# Patient Record
Sex: FEMALE | Race: WHITE | HISPANIC OR LATINO | Employment: FULL TIME | URBAN - METROPOLITAN AREA
[De-identification: names, ages, dates, MRNs, and addresses within clinical notes are randomized per-mention and may not be internally consistent; named-entity substitution may affect disease eponyms.]

---

## 2017-04-21 ENCOUNTER — ALLSCRIPTS OFFICE VISIT (OUTPATIENT)
Dept: OTHER | Facility: OTHER | Age: 22
End: 2017-04-21

## 2017-04-24 ENCOUNTER — GENERIC CONVERSION - ENCOUNTER (OUTPATIENT)
Dept: OTHER | Facility: OTHER | Age: 22
End: 2017-04-24

## 2017-06-15 ENCOUNTER — GENERIC CONVERSION - ENCOUNTER (OUTPATIENT)
Dept: OTHER | Facility: OTHER | Age: 22
End: 2017-06-15

## 2017-07-18 ENCOUNTER — ALLSCRIPTS OFFICE VISIT (OUTPATIENT)
Dept: OTHER | Facility: OTHER | Age: 22
End: 2017-07-18

## 2017-08-28 ENCOUNTER — ALLSCRIPTS OFFICE VISIT (OUTPATIENT)
Dept: OTHER | Facility: OTHER | Age: 22
End: 2017-08-28

## 2017-09-03 ENCOUNTER — LAB CONVERSION - ENCOUNTER (OUTPATIENT)
Dept: OTHER | Facility: OTHER | Age: 22
End: 2017-09-03

## 2017-09-03 LAB
CHLAMYDIA, LIQUID-BASED (HISTORICAL): NOT DETECTED
GC BY MOL. METHOD (HISTORICAL): NOT DETECTED
HPV 18 (HISTORICAL): NOT DETECTED
HPV HIGH RISK 16/18 (HISTORICAL): DETECTED
HPV16 (HISTORICAL): NOT DETECTED
PAP (HISTORICAL): ABNORMAL
TRICHOMONAS (HISTORICAL): NOT DETECTED

## 2017-09-20 LAB
CHLAMYDIA, LIQUID-BASED (HISTORICAL): NORMAL
GC BY MOL. METHOD (HISTORICAL): NORMAL
PAP (HISTORICAL): NORMAL

## 2017-11-09 ENCOUNTER — ALLSCRIPTS OFFICE VISIT (OUTPATIENT)
Dept: OTHER | Facility: OTHER | Age: 22
End: 2017-11-09

## 2017-11-10 NOTE — PROGRESS NOTES
Assessment    1  Large breasts (611 1) (N62)   2  Chronic thoracic back pain (724 1,338 29) (M54 6,G89 29)   3  Need for influenza vaccination (V04 81) (Z23)    Plan  Large breasts    · 1 - Jerrica KING, [facility]  *  Status: Active Requested for: [id number]  Care Summary provided  : Yes  Need for influenza vaccination    · Fluzone Quadrivalent 0 5 ML Intramuscular Suspension Prefilled Syringe    Discussion/Summary    Large breast with difficulty in life- exercise, rash, activities  Painful shoulder and back  Chief Complaint  Patient here with mid back pain thinking it's coming from large breast can't sleeping      History of Present Illness  HPI: back pain, unable to sleep on stomach, boils under breast, painful rash under [id number] DD, but falling out of itlarge chestwith strap indentations   Breast Hypertrophy (Brief): The patient is being seen for an initial evaluation of breast hypertrophy  Symptoms:  breast size unacceptable to the patient,-- large breasts,-- breast tenderness,-- shoulder pain,-- back pain,-- skin irritation-- and-- exercise impairment, but-- no breast pain  Associated symptoms:  breast skin rash  The patient is not currently being treated for this problem  Review of Systems   Constitutional: No fever, no chills, feels well, no tiredness, no recent weight gain or loss  ENT: no ear ache, no loss of hearing, no nosebleeds or nasal discharge, no sore throat or hoarseness  Cardiovascular: no complaints of slow or fast heart rate, no chest pain, no palpitations, no leg claudication or lower extremity edema  Respiratory: no complaints of shortness of breath, no wheezing, no dyspnea on exertion, no orthopnea or PND  Breasts: rash, but-- as noted in HPI  Gastrointestinal: no complaints of abdominal pain, no constipation, no nausea or diarrhea, no vomiting, no bloody stools    Genitourinary: no complaints of dysuria, no incontinence, no pelvic pain, no dysmenorrhea, no vaginal discharge or abnormal vaginal bleeding  Musculoskeletal: no complaints of arthralgia, no myalgia, no joint swelling or stiffness, no limb pain or swelling  Integumentary: no complaints of skin rash or lesion, no itching or dry skin, no skin wounds  Neurological: no complaints of headache, no confusion, no numbness or tingling, no dizziness or fainting  Active Problems  1  Contraceptive use (V25 40) (Z30 40)   2  Encounter for gynecological examination without abnormal finding (V72 31) (Z01 419)   3  Encounter for PPD test (V74 1) (Z11 1)   4  Hemorrhoids, external (455 3) (K64 4)   5  Need for DTaP vaccine (V06 1) (Z23)   6  Need for influenza vaccination (V04 81) (Z23)   7  Obesity (278 00) (E66 9)   8  Unsatisfactory cervical Papanicolaou smear (795 08) (R87 615)   9  Weight gain (783 1) (R63 5)   10  Well adult on routine health check (V70 0) (Z00 00)    Past Medical History  1  History of Acute maxillary sinusitis (461 0) (J01 00)   2  History of External Hemorrhoids (455 3)   3  History of fatigue (V13 89) (Z87 898)   4  History of fever (V13 89) (Z87 898)   5  History of upper respiratory infection (V12 09) (Z87 09)   6  History of Joint pain, knee (719 46) (M25 569)   7  History of Screen for STD (sexually transmitted disease) (V74 5) (Z11 3)   8  History of Sore throat (462) (J02 9)  Active Problems And Past Medical History Reviewed: The active problems and past medical history were reviewed and updated today  Family History  Mother    1  Family history of Healthy adult  Father    2  Family history of Diabetes  Maternal Aunt    3  Family history of Breast cancer  Family History Reviewed: The family history was reviewed and updated today  Social History   · Denied: History of Alcohol Use (History)   · Denied: History of Drug Use   · Never A Smoker  The social history was reviewed and updated today  Surgical History    1   History of Oral Surgery Tooth Extraction  Surgical History Reviewed: The surgical history was reviewed and updated today  Current Meds   1  Orsythia 0 1-20 MG-MCG Oral Tablet; Take 1 tablet daily; Therapy: 86AMK1270 to (Evaluate:19Jun2018)  Requested for: 32GLL9588; Last Rx:43Udn0308 Ordered    The medication list was reviewed and updated today  Allergies  1  No Known Drug Allergies  2  No Known Environmental Allergies   3  No Known Food Allergies    Vitals   Recorded: 22WCG2723 10:42AM   Heart Rate 88   Respiration 18   Systolic 735   Diastolic 70   Height 5 ft 3 in   Weight 222 lb 8 oz   BMI Calculated 39 41   BSA Calculated 2 02       Physical Exam   Constitutional  General appearance: No acute distress, well appearing and well nourished  Pulmonary  Respiratory effort: No increased work of breathing or signs of respiratory distress  Auscultation of lungs: Clear to auscultation  Cardiovascular  Auscultation of heart: Normal rate and rhythm, normal S1 and S2, without murmurs  Examination of extremities for edema and/or varicosities: Normal    Abdomen  Abdomen: Non-tender, no masses  Liver and spleen: No hepatomegaly or splenomegaly  Musculoskeletal  Inspection/palpation of joints, bones, and muscles: Abnormal  -- posture forward, spnal muscles tense  Skin  Skin and subcutaneous tissue: Abnormal  -- irritation under breast         Results/Data  PHQ-2 Adult Depression Screening 19DRB8154 11:13AM User, Ahs     Test Name Result Flag Reference   PHQ-2 Adult Depression Score 0       Over the last two weeks, how often have you been bothered by any of the following problems?  Little interest or pleasure in doing things: Not at all - 0 Feeling down, depressed, or hopeless: Not at all - 0   PHQ-2 Adult Depression Screening Negative         Future Appointments    Date/Time Provider Specialty Site   12/22/2017 01:00 PM NORAH Nix   01/11/2018 08:15 AM Aundrea Vines, DO [address]   Electronically signed by : Laurence Perera DO; Nov 9 2017 11:54AM EST                       (Author)

## 2017-12-05 ENCOUNTER — GENERIC CONVERSION - ENCOUNTER (OUTPATIENT)
Dept: OTHER | Facility: OTHER | Age: 22
End: 2017-12-05

## 2017-12-20 ENCOUNTER — ALLSCRIPTS OFFICE VISIT (OUTPATIENT)
Dept: OTHER | Facility: OTHER | Age: 22
End: 2017-12-20

## 2018-01-11 NOTE — PROGRESS NOTES
Chief Complaint  Patient presents to office for administration of Tubersol  Active Problems    1  Contraceptive use (V25 40) (Z30 40)   2  Encounter for gynecological examination without abnormal finding (V72 31) (Z01 419)   3  Need for DTaP vaccine (V06 1) (Z23)   4  Need for influenza vaccination (V04 81) (Z23)   5  Weight gain (783 1) (R63 5)   6  Well adult on routine health check (V70 0) (Z00 00)    Current Meds   1  Multiple Vitamins Oral Tablet; TAKE 1 TABLET DAILY; Therapy: 97QHG5210 to Recorded   2  Orsythia 0 1-20 MG-MCG Oral Tablet; Take one tablet daily as directed; Therapy: 05NMV4090 to Recorded    Allergies    1  No Known Drug Allergies    2  No Known Environmental Allergies   3   No Known Food Allergies    Plan  Encounter for PPD test    · Tubersol 5 UNIT/0 1ML Intradermal Solution    Future Appointments    Date/Time Provider Specialty Site   11/14/2016 08:15 AM Adwoa Davenport DO Family Medicine [address] [facility]     Signatures   Electronically signed by : Tana Kenny DO; Oct 17 2016  4:29PM EST                       (Author)

## 2018-01-12 VITALS
WEIGHT: 220 LBS | DIASTOLIC BLOOD PRESSURE: 82 MMHG | HEIGHT: 63 IN | BODY MASS INDEX: 38.98 KG/M2 | SYSTOLIC BLOOD PRESSURE: 122 MMHG

## 2018-01-12 VITALS
SYSTOLIC BLOOD PRESSURE: 114 MMHG | WEIGHT: 218 LBS | HEART RATE: 92 BPM | DIASTOLIC BLOOD PRESSURE: 78 MMHG | RESPIRATION RATE: 16 BRPM

## 2018-01-12 NOTE — PROGRESS NOTES
Assessment   1  Need for influenza vaccination (V04 81) (Z23)  2  Well adult on routine health check (V70 0) (Z00 00)  3  Need for DTaP vaccine (V06 1) (Z23)    Plan  Encounter for gynecological examination without abnormal finding    · Stop: Jesus Ou 0 1-20 MG-MCG Oral Tablet  Health Maintenance    · Continue: Multiple Vitamins Oral Tablet; TAKE 1 TABLET DAILY  Need for DTaP vaccine    · Administered: Adacel 5-2-15 5 LF-MCG/0 5 Intramuscular Suspension  Need for influenza vaccination    · Administered: Fluzone Quadrivalent 0 5 ML Intramuscular Suspension Prefilled Syringe    Discussion/Summary  health maintenance visit Currently, she eats a healthy diet  cervical cancer screening is current Breast cancer screening: breast cancer screening is not indicated  Colorectal cancer screening: colorectal cancer screening is not indicated  Osteoporosis screening: bone mineral density testing is not indicated  The risks and benefits of immunizations were discussed  Advice and education were given regarding aerobic exercise  Patient discussion: discussed with the patient  DISCUSSED WEIGHT GAIN- SHE'S GETTING BACK ON TRACK WITH EXERCISE AND DIETARY CHANGES  WILL COME BACK NEXT MONTH WITH FOOD LOG  Chief Complaint  Patient here for Annual Wellness exam      History of Present Illness  , Adult Female: The patient is being seen for a health maintenance evaluation  General Health: The patient's health since the last visit is described as good  She has regular dental visits  She denies vision problems  She denies hearing loss  Lifestyle:  She consumes a diverse and healthy diet  She has weight concerns  She exercises regularly  She does not use tobacco  She denies alcohol use  She denies drug use  Reproductive health:  she uses contraception  Screening: cancer screening reviewed and updated  Cervical cancer screening includes a pap smear performed last year   Breast cancer screening includes no previous mammogram  She hasn't been previously screened for colorectal cancer  metabolic screening reviewed and updated  Metabolic screening includes lipid profile performed within the past five years, glucose screening performed last year and thyroid function test performed last year  HPI: GOING TO [facility]  [facility]  HERE FOR WELLNESS      Review of Systems    Constitutional: recent 20 lb weight gain  Eyes: No complaints of eye pain, no red eyes, no eyesight problems, no discharge, no dry eyes, no itching of eyes  ENT: no complaints of earache, no loss of hearing, no nose bleeds, no nasal discharge, no sore throat, no hoarseness  Cardiovascular: No complaints of slow heart rate, no fast heart rate, no chest pain, no palpitations, no leg claudication, no lower extremity edema  Respiratory: No complaints of shortness of breath, no wheezing, no cough, no SOB on exertion, no orthopnea, no PND  Gastrointestinal: No complaints of abdominal pain, no constipation, no nausea or vomiting, no diarrhea, no bloody stools  Genitourinary: No complaints of dysuria, no incontinence, no pelvic pain, no dysmenorrhea, no vaginal discharge or bleeding  Musculoskeletal: No complaints of arthralgias, no myalgias, no joint swelling or stiffness, no limb pain or swelling  Integumentary: No complaints of skin rash or lesions, no itching, no skin wounds, no breast pain or lump  Neurological: No complaints of headache, no confusion, no convulsions, no numbness, no dizziness or fainting, no tingling, no limb weakness, no difficulty walking  Psychiatric: Not suicidal, no sleep disturbance, no anxiety or depression, no change in personality, no emotional problems  Endocrine: No complaints of proptosis, no hot flashes, no muscle weakness, no deepening of the voice, no feelings of weakness     Hematologic/Lymphatic: No complaints of swollen glands, no swollen glands in the neck, does not bleed easily, does not bruise easily  Active Problems   1  Contraceptive use (V25 40) (Z30 40)  2  Encounter for gynecological examination without abnormal finding (V72 31) (Z01 419)  3  Need for influenza vaccination (V04 81) (Z23)  4  Weight gain (783 1) (R63 5)    Past Medical History    · History of External Hemorrhoids (455 3)   · History of fatigue (V13 89) (G45 365)   · History of fever (V13 89) (Q96 967)   · History of upper respiratory infection (V12 09) (Z87 09)   · History of Joint pain, knee (719 46) (M25 569)   · History of Screen for STD (sexually transmitted disease) (V74 5) (Z11 3)   · History of Sore throat (462) (J02 9)    Surgical History    · History of Oral Surgery Tooth Extraction    Family History  Mother    · Family history of Healthy adult  Father    · Family history of Diabetes  Maternal Aunt    · Family history of Breast cancer    Social History    · Denied: History of Alcohol Use (History)   · Denied: History of Drug Use   · Never A Smoker    Current Meds  1  Lessina 0 1-20 MG-MCG Oral Tablet; TAKE 1 TABLET DAILY; Therapy: 62LPU1481 to (Evaluate:03Mar2017)  Requested for: 99PXN1697; Last   Rx:08Mar2016 Ordered  2  Multiple Vitamins Oral Tablet; TAKE 1 TABLET DAILY; Therapy: 68VQQ1808 to Recorded  3  Orsythia 0 1-20 MG-MCG Oral Tablet; Take one tablet daily as directed; Therapy: 57CNE4417 to Recorded    Allergies   1  No Known Drug Allergies   2  No Known Environmental Allergies  3  No Known Food Allergies    Vitals   Recorded: 22RUL5380 36:06QQ   Systolic 530   Diastolic 72   Heart Rate 88   Respiration 18   Height 5 ft 3 46 in   Weight 220 lb 4 oz   BMI Calculated 38 45   BSA Calculated 2 03     Physical Exam    Constitutional   General appearance: No acute distress, well appearing and well nourished  Head and Face   Head and face: Normal     Eyes   Conjunctiva and lids: No swelling, erythema or discharge  Pupils and irises: Equal, round, reactive to light      Ears, Nose, Mouth, and Throat   External inspection of ears and nose: Normal     Otoscopic examination: Tympanic membranes translucent with normal light reflex  Canals patent without erythema  Hearing: Normal     Nasal mucosa, septum, and turbinates: Normal without edema or erythema  Lips, teeth, and gums: Normal, good dentition  Oropharynx: Normal with no erythema, edema, exudate or lesions  Neck   Neck: Supple, symmetric, trachea midline, no masses  Thyroid: Normal, no thyromegaly  Pulmonary   Respiratory effort: No increased work of breathing or signs of respiratory distress  Auscultation of lungs: Clear to auscultation  Cardiovascular   Auscultation of heart: Normal rate and rhythm, normal S1 and S2, no murmurs  Examination of extremities for edema and/or varicosities: Normal     Abdomen   Abdomen: Non-tender, no masses  Liver and spleen: No hepatomegaly or splenomegaly  Lymphatic   Palpation of lymph nodes in neck: No lymphadenopathy  Palpation of lymph nodes in axillae: No lymphadenopathy  Musculoskeletal   Gait and station: Normal     Digits and nails: Normal without clubbing or cyanosis  Joints, bones, and muscles: Normal     Range of motion: Normal     Stability: Normal     Muscle strength/tone: Normal     Skin   Skin and subcutaneous tissue: Normal without rashes or lesions  Palpation of skin and subcutaneous tissue: Normal turgor  Neurologic   Cranial nerves: Cranial nerves II-XII intact  Cortical function: Normal mental status  Reflexes: 2+ and symmetric  Sensation: No sensory loss  Coordination: Normal finger to nose and heel to shin  Psychiatric   Judgment and insight: Normal     Orientation to person, place, and time: Normal     Recent and remote memory: Intact      Mood and affect: Normal        Results/Data  (1) THIN PREP PAP WITH IMAGING 99STP9961 12:00AM      Test Name Result Flag Reference   THIN PREP PAP W  IMAG COMMENT 03/08/2016       Summary / No summary entered :      No summary entered  Documents attached :      sPap Tests - Julieann Bumpers;  Enc: [id number] - Appointment - Julieann Bumpers -      (Obstetrics/Gynecology) (Additional Information Document)    Future Appointments    Date/Time Provider Specialty Site   11/14/2016 08:15 AM Adrienne Sanderson DO Family Medicine [address] [facility]   10/17/2016 04:00 PM Adele Haider, [facility]     Signatures   Electronically signed by : Anila Levy DO; Oct 12 2016  3:06PM EST                       (Author)

## 2018-01-13 VITALS
SYSTOLIC BLOOD PRESSURE: 120 MMHG | HEIGHT: 63 IN | WEIGHT: 218 LBS | DIASTOLIC BLOOD PRESSURE: 78 MMHG | BODY MASS INDEX: 38.62 KG/M2

## 2018-01-14 VITALS
HEIGHT: 63 IN | WEIGHT: 222.5 LBS | BODY MASS INDEX: 39.42 KG/M2 | DIASTOLIC BLOOD PRESSURE: 70 MMHG | HEART RATE: 88 BPM | RESPIRATION RATE: 18 BRPM | SYSTOLIC BLOOD PRESSURE: 100 MMHG

## 2018-01-22 VITALS
BODY MASS INDEX: 38.67 KG/M2 | RESPIRATION RATE: 16 BRPM | HEART RATE: 80 BPM | WEIGHT: 226.5 LBS | HEIGHT: 64 IN | DIASTOLIC BLOOD PRESSURE: 72 MMHG | SYSTOLIC BLOOD PRESSURE: 112 MMHG

## 2018-01-23 NOTE — PROGRESS NOTES
Assessment    1  Well adult on routine health check (V70 0) (Z00 00)   2  Large breasts (611 1) (N62)   3  Weight gain (783 1) (R63 5)    Plan  Large breasts, Weight gain, Well adult on routine health check    · Follow-up visit in 3 months Evaluation and Treatment  Follow-up  Status: Hold For -  Scheduling  Requested for: 33Uuh9461    Discussion/Summary  health maintenance visit healthy adult female Currently, she eats a healthy diet and has an adequate exercise regimen  the risks and benefits of cervical cancer screening were discussed cervical cancer screening is current cervical cancer screening is managed by GYN  Breast cancer screening: the risks and benefits of breast cancer screening were discussed, monthly self breast exam was advised and breast cancer screening is managed by GYN  Colorectal cancer screening: colorectal cancer screening is not indicated  Osteoporosis screening: bone mineral density testing is not indicated  The risks and benefits of immunizations were discussed and immunizations are up to date  Advice and education were given regarding nutrition, aerobic exercise, weight loss and Sobeida is to work on a lower carb diet, continued regular exercise (with regular core strengthening for her back), and weight loss  Pt meets many history and exam points neccesary for coverage for her possible surgery - my hope is that she gets coverage  Patient discussion: discussed with the patient, Pt is to f/u in 1 year, or sooner PRN  Chief Complaint  PT is being seen today for a  visit  History of Present Illness  , Adult Female: The patient is being seen for a health maintenance evaluation  The last health maintenance visit was year(s) ago  General Health: The patient's health since the last visit is described as good   Pt is a SPeech Therapist  Has kept up on PAP Smears  She has regular dental visits  She denies vision problems  She denies hearing loss  Immunizations status: up to date   Wears glasses - no vision changes  Lifestyle:  She consumes a diverse and healthy diet  She has weight concerns  She exercises regularly  She does not use tobacco  She denies alcohol use  She denies drug use  Reproductive health: the patient is premenopausal   she reports normal menses  she is sexually active  Screening: cancer screening reviewed and current  metabolic screening reviewed and current  risk screening reviewed and current  Additional History:  No CP/SOB  No abd pain  No new breast lumps - does have chronic upper back issues, deep lines in her shoulders from her bra, skin irritations, etc, due to her hx of pendulous breasts  She is waiting to hear back from her insurance co regarding coverage for breast reduction surgery  Menses are regular  Some stress with trying to get into graduate school - but no baseline anxiety / depression  Chronic difficulty losing weight - did not tolerate diet meds in past; exercises regularly; has normal TSH testing in 03/2016  HPI: As above  Review of Systems    Constitutional: as noted in HPI  Cardiovascular: as noted in HPI  Respiratory: as noted in HPI  Gastrointestinal: as noted in HPI  Genitourinary: as noted in HPI  Musculoskeletal: as noted in HPI  Psychiatric: as noted in HPI  Active Problems    1  Chronic thoracic back pain (724 1,338 29) (M54 6,G89 29)   2  Contraceptive use (V25 40) (Z30 40)   3  Encounter for gynecological examination without abnormal finding (V72 31) (Z01 419)   4  Encounter for PPD test (V74 1) (Z11 1)   5  Hemorrhoids, external (455 3) (K64 4)   6  Large breasts (611 1) (N62)   7  Need for DTaP vaccine (V06 1) (Z23)   8  Need for influenza vaccination (V04 81) (Z23)   9  Obesity (278 00) (E66 9)   10  Unsatisfactory cervical Papanicolaou smear (795 08) (R87 615)   11  Weight gain (783 1) (R63 5)   12   Well adult on routine health check (V70 0) (Z00 00)    Past Medical History    · History of Acute maxillary sinusitis (461 0) (J01 00)   · History of External Hemorrhoids (455 3)   · History of fatigue (V13 89) (X26 062)   · History of fever (V13 89) (B86 579)   · History of upper respiratory infection (V12 09) (Z87 09)   · History of Joint pain, knee (719 46) (M25 569)   · History of Screen for STD (sexually transmitted disease) (V74 5) (Z11 3)   · History of Sore throat (462) (J02 9)    Surgical History    · History of Oral Surgery Tooth Extraction    Family History  Mother    · Family history of Healthy adult  Father    · Family history of Diabetes  Maternal Aunt    · Family history of Breast cancer    Social History    · Denied: History of Alcohol Use (History)   · Denied: History of Drug Use   · Never A Smoker    Current Meds   1  Orsythia 0 1-20 MG-MCG Oral Tablet; Take 1 tablet daily; Therapy: 58VMY5734 to (Evaluate:19Jun2018)  Requested for: 88CLQ7386; Last   Rx:83Zas4183 Ordered    Allergies    1  No Known Drug Allergies    2  No Known Environmental Allergies   3  No Known Food Allergies    Vitals   Recorded: 41Gfc6021 08:59AM   Heart Rate 80   Respiration 16   Systolic 304   Diastolic 72   Height 5 ft 3 54 in   Weight 226 lb 8 oz   BMI Calculated 39 44   BSA Calculated 2 05     Physical Exam    Constitutional   General appearance: No acute distress, well appearing and well nourished  NAD; VSS; pleasant  Head and Face   Head and face: Normal     Eyes   Conjunctiva and lids: No swelling, erythema or discharge  Ears, Nose, Mouth, and Throat   External inspection of ears and nose: Normal     Otoscopic examination: Tympanic membranes translucent with normal light reflex  Canals patent without erythema  Hearing: Normal     Lips, teeth, and gums: Normal, good dentition  Oropharynx: Normal with no erythema, edema, exudate or lesions  Neck   Neck: Supple, symmetric, trachea midline, no masses  Pt does slightly get an exaggerated kyphosis of the upper thoracic spine, due her excessive breast tissue  Pulmonary   Respiratory effort: No increased work of breathing or signs of respiratory distress  Auscultation of lungs: Clear to auscultation  Cardiovascular   Auscultation of heart: Normal rate and rhythm, normal S1 and S2, no murmurs  Abdomen   Abdomen: Non-tender, no masses  Liver and spleen: No hepatomegaly or splenomegaly  Lymphatic   Palpation of lymph nodes in neck: No lymphadenopathy  Musculoskeletal   Gait and station: Normal     Psychiatric   Judgment and insight: Normal     Orientation to person, place, and time: Normal     Recent and remote memory: Intact      Mood and affect: Normal        Future Appointments    Date/Time Provider Specialty Site   12/22/2017 01:00 PM NORAH [facility]   Electronically signed by : Dudley Cottrell DO; Dec 20 2017  9:35AM EST                       (Author)

## 2018-01-23 NOTE — MISCELLANEOUS
The above named patient is under our care  She has requested we write a letter regarding her upcoming breast reduction surgery scheduled with you  Jada Cook has been seen  for the last two years in our office for her annual exam   She has complained of neck and back pain for the last several years due to breast size  If you have any questions or concerns regarding this matter please contact Reilly Covarrubias at [phone]        Sincerely,        Inocencio Porter MD

## 2018-02-02 NOTE — PRE-PROCEDURE INSTRUCTIONS
Pre-Surgery Instructions:   Medication Instructions    norethindrone-ethinyl estradiol-iron (ESTROSTEP FE) 1-20/1-30/1-35 MG-MCG TABS Instructed patient per Anesthesia Guidelines  Pre op and bathing instructions reviewed   Pt has hibiclens

## 2018-02-21 ENCOUNTER — ANESTHESIA EVENT (OUTPATIENT)
Dept: PERIOP | Facility: HOSPITAL | Age: 23
End: 2018-02-21
Payer: COMMERCIAL

## 2018-02-22 ENCOUNTER — HOSPITAL ENCOUNTER (OUTPATIENT)
Facility: HOSPITAL | Age: 23
Setting detail: OUTPATIENT SURGERY
Discharge: HOME | End: 2018-02-22
Attending: PLASTIC SURGERY | Admitting: PLASTIC SURGERY
Payer: COMMERCIAL

## 2018-02-22 ENCOUNTER — ANESTHESIA (OUTPATIENT)
Dept: PERIOP | Facility: HOSPITAL | Age: 23
End: 2018-02-22
Payer: COMMERCIAL

## 2018-02-22 VITALS
SYSTOLIC BLOOD PRESSURE: 118 MMHG | HEART RATE: 100 BPM | DIASTOLIC BLOOD PRESSURE: 83 MMHG | HEIGHT: 63 IN | RESPIRATION RATE: 16 BRPM | WEIGHT: 224 LBS | BODY MASS INDEX: 39.69 KG/M2 | OXYGEN SATURATION: 98 % | TEMPERATURE: 98.3 F

## 2018-02-22 DIAGNOSIS — N62 HYPERTROPHY OF BREAST: ICD-10-CM

## 2018-02-22 LAB — EXT PREGNANCY TEST URINE: NEGATIVE

## 2018-02-22 PROCEDURE — 88305 TISSUE EXAM BY PATHOLOGIST: CPT | Performed by: PATHOLOGY

## 2018-02-22 PROCEDURE — 81025 URINE PREGNANCY TEST: CPT | Performed by: STUDENT IN AN ORGANIZED HEALTH CARE EDUCATION/TRAINING PROGRAM

## 2018-02-22 PROCEDURE — 88305 TISSUE EXAM BY PATHOLOGIST: CPT | Performed by: PLASTIC SURGERY

## 2018-02-22 RX ORDER — ONDANSETRON 2 MG/ML
INJECTION INTRAMUSCULAR; INTRAVENOUS AS NEEDED
Status: DISCONTINUED | OUTPATIENT
Start: 2018-02-22 | End: 2018-02-22 | Stop reason: SURG

## 2018-02-22 RX ORDER — MAGNESIUM HYDROXIDE 1200 MG/15ML
LIQUID ORAL AS NEEDED
Status: DISCONTINUED | OUTPATIENT
Start: 2018-02-22 | End: 2018-02-22 | Stop reason: HOSPADM

## 2018-02-22 RX ORDER — GLYCOPYRROLATE 0.2 MG/ML
INJECTION INTRAMUSCULAR; INTRAVENOUS AS NEEDED
Status: DISCONTINUED | OUTPATIENT
Start: 2018-02-22 | End: 2018-02-22 | Stop reason: SURG

## 2018-02-22 RX ORDER — BUPIVACAINE HYDROCHLORIDE AND EPINEPHRINE 2.5; 5 MG/ML; UG/ML
INJECTION, SOLUTION EPIDURAL; INFILTRATION; INTRACAUDAL; PERINEURAL AS NEEDED
Status: DISCONTINUED | OUTPATIENT
Start: 2018-02-22 | End: 2018-02-22 | Stop reason: HOSPADM

## 2018-02-22 RX ORDER — PROPOFOL 10 MG/ML
INJECTION, EMULSION INTRAVENOUS AS NEEDED
Status: DISCONTINUED | OUTPATIENT
Start: 2018-02-22 | End: 2018-02-22 | Stop reason: SURG

## 2018-02-22 RX ORDER — LABETALOL HYDROCHLORIDE 5 MG/ML
INJECTION, SOLUTION INTRAVENOUS AS NEEDED
Status: DISCONTINUED | OUTPATIENT
Start: 2018-02-22 | End: 2018-02-22 | Stop reason: SURG

## 2018-02-22 RX ORDER — ROCURONIUM BROMIDE 10 MG/ML
INJECTION, SOLUTION INTRAVENOUS AS NEEDED
Status: DISCONTINUED | OUTPATIENT
Start: 2018-02-22 | End: 2018-02-22 | Stop reason: SURG

## 2018-02-22 RX ORDER — ONDANSETRON 2 MG/ML
4 INJECTION INTRAMUSCULAR; INTRAVENOUS ONCE AS NEEDED
Status: DISCONTINUED | OUTPATIENT
Start: 2018-02-22 | End: 2018-02-22 | Stop reason: HOSPADM

## 2018-02-22 RX ORDER — SODIUM CHLORIDE 9 MG/ML
INJECTION, SOLUTION INTRAVENOUS CONTINUOUS PRN
Status: DISCONTINUED | OUTPATIENT
Start: 2018-02-22 | End: 2018-02-22 | Stop reason: SURG

## 2018-02-22 RX ORDER — LIDOCAINE HYDROCHLORIDE 10 MG/ML
INJECTION, SOLUTION INFILTRATION; PERINEURAL AS NEEDED
Status: DISCONTINUED | OUTPATIENT
Start: 2018-02-22 | End: 2018-02-22 | Stop reason: SURG

## 2018-02-22 RX ORDER — FENTANYL CITRATE 50 UG/ML
INJECTION, SOLUTION INTRAMUSCULAR; INTRAVENOUS AS NEEDED
Status: DISCONTINUED | OUTPATIENT
Start: 2018-02-22 | End: 2018-02-22 | Stop reason: SURG

## 2018-02-22 RX ORDER — OXYCODONE HYDROCHLORIDE AND ACETAMINOPHEN 5; 325 MG/1; MG/1
2 TABLET ORAL EVERY 4 HOURS PRN
Status: DISCONTINUED | OUTPATIENT
Start: 2018-02-22 | End: 2018-02-22 | Stop reason: HOSPADM

## 2018-02-22 RX ORDER — FENTANYL CITRATE/PF 50 MCG/ML
25 SYRINGE (ML) INJECTION
Status: DISCONTINUED | OUTPATIENT
Start: 2018-02-22 | End: 2018-02-22 | Stop reason: HOSPADM

## 2018-02-22 RX ORDER — MIDAZOLAM HYDROCHLORIDE 1 MG/ML
INJECTION INTRAMUSCULAR; INTRAVENOUS AS NEEDED
Status: DISCONTINUED | OUTPATIENT
Start: 2018-02-22 | End: 2018-02-22 | Stop reason: SURG

## 2018-02-22 RX ORDER — ONDANSETRON 2 MG/ML
4 INJECTION INTRAMUSCULAR; INTRAVENOUS EVERY 8 HOURS PRN
Status: DISCONTINUED | OUTPATIENT
Start: 2018-02-22 | End: 2018-02-22 | Stop reason: HOSPADM

## 2018-02-22 RX ADMIN — ROCURONIUM BROMIDE 20 MG: 10 INJECTION INTRAVENOUS at 13:20

## 2018-02-22 RX ADMIN — SODIUM CHLORIDE: 0.9 INJECTION, SOLUTION INTRAVENOUS at 13:30

## 2018-02-22 RX ADMIN — ONDANSETRON 4 MG: 2 INJECTION INTRAMUSCULAR; INTRAVENOUS at 15:07

## 2018-02-22 RX ADMIN — DEXAMETHASONE SODIUM PHOSPHATE 10 MG: 10 INJECTION INTRAMUSCULAR; INTRAVENOUS at 12:17

## 2018-02-22 RX ADMIN — CEFAZOLIN SODIUM 2000 MG: 2 SOLUTION INTRAVENOUS at 12:09

## 2018-02-22 RX ADMIN — HYDROMORPHONE HYDROCHLORIDE 0.5 MG: 1 INJECTION, SOLUTION INTRAMUSCULAR; INTRAVENOUS; SUBCUTANEOUS at 12:54

## 2018-02-22 RX ADMIN — PROPOFOL 50 MG: 10 INJECTION, EMULSION INTRAVENOUS at 12:58

## 2018-02-22 RX ADMIN — LABETALOL HYDROCHLORIDE 10 MG: 5 INJECTION, SOLUTION INTRAVENOUS at 13:08

## 2018-02-22 RX ADMIN — PROPOFOL 50 MG: 10 INJECTION, EMULSION INTRAVENOUS at 13:02

## 2018-02-22 RX ADMIN — FENTANYL CITRATE 100 MCG: 50 INJECTION INTRAMUSCULAR; INTRAVENOUS at 12:14

## 2018-02-22 RX ADMIN — NEOSTIGMINE METHYLSULFATE 3 MG: 1 INJECTION, SOLUTION INTRAMUSCULAR; INTRAVENOUS; SUBCUTANEOUS at 15:39

## 2018-02-22 RX ADMIN — HYDROMORPHONE HYDROCHLORIDE 0.5 MG: 1 INJECTION, SOLUTION INTRAMUSCULAR; INTRAVENOUS; SUBCUTANEOUS at 15:27

## 2018-02-22 RX ADMIN — FENTANYL CITRATE 25 MCG: 50 INJECTION INTRAMUSCULAR; INTRAVENOUS at 16:08

## 2018-02-22 RX ADMIN — FENTANYL CITRATE 50 MCG: 50 INJECTION INTRAMUSCULAR; INTRAVENOUS at 12:40

## 2018-02-22 RX ADMIN — ROCURONIUM BROMIDE 50 MG: 10 INJECTION INTRAVENOUS at 12:14

## 2018-02-22 RX ADMIN — MIDAZOLAM HYDROCHLORIDE 2 MG: 1 INJECTION, SOLUTION INTRAMUSCULAR; INTRAVENOUS at 12:09

## 2018-02-22 RX ADMIN — GLYCOPYRROLATE 0.4 MG: 0.2 INJECTION, SOLUTION INTRAMUSCULAR; INTRAVENOUS at 15:39

## 2018-02-22 RX ADMIN — PROPOFOL 200 MG: 10 INJECTION, EMULSION INTRAVENOUS at 12:14

## 2018-02-22 RX ADMIN — HYDROMORPHONE HYDROCHLORIDE 0.5 MG: 1 INJECTION, SOLUTION INTRAMUSCULAR; INTRAVENOUS; SUBCUTANEOUS at 13:02

## 2018-02-22 RX ADMIN — ONDANSETRON 4 MG: 2 INJECTION INTRAMUSCULAR; INTRAVENOUS at 17:19

## 2018-02-22 RX ADMIN — FENTANYL CITRATE 50 MCG: 50 INJECTION INTRAMUSCULAR; INTRAVENOUS at 12:50

## 2018-02-22 RX ADMIN — LIDOCAINE HYDROCHLORIDE 25 MG: 10 INJECTION, SOLUTION INFILTRATION; PERINEURAL at 12:14

## 2018-02-22 RX ADMIN — SODIUM CHLORIDE: 0.9 INJECTION, SOLUTION INTRAVENOUS at 11:26

## 2018-02-22 NOTE — DISCHARGE INSTRUCTIONS
1 [facility]ly [facility], [facility], [address] [facility], [address] [facility], [facility], [address] [place] /S / [employer]  [facility]       No heavy lifting >20 pounds    Do not raise hands above head    No ice or heating pack to chest    Wear the surgical bra at all times except the shower   If the bra is tight and is leaving marks on the skin unclasp the bottom clasps of the bra    Ok to shower    No bathing    Change dressing daily    Do not lay on stomach    No smoking    No pushing or pulling    Monitor your nipples for color change (pale white or eggplant purple), if there is a significant change call my office/answering service    Call the office for an appointment in 5-10 days - [phone]

## 2018-02-22 NOTE — ANESTHESIA PREPROCEDURE EVALUATION
Review of Systems/Medical History  Patient summary reviewed        Cardiovascular  Exercise tolerance: good,     Pulmonary  Asthma: well controlled/ stable ,        GI/Hepatic  Negative GI/hepatic ROS          Negative  ROS        Endo/Other    Obesity  morbid obesity   GYN  Negative gynecology ROS          Hematology  Negative hematology ROS      Musculoskeletal  Negative musculoskeletal ROS        Neurology  Negative neurology ROS      Psychology   Negative psychology ROS              Physical Exam    Airway    Mallampati score: I  TM Distance: >3 FB  Neck ROM: full     Dental   No notable dental hx     Cardiovascular  Cardiovascular exam normal    Pulmonary  Pulmonary exam normal     Other Findings        Anesthesia Plan  ASA Score- 2     Anesthesia Type- general with ASA Monitors  Additional Monitors:   Airway Plan: ETT  Plan Factors-  Patient did not smoke on day of surgery  Induction- intravenous  Postoperative Plan- Plan for postoperative opioid use  Planned trial extubation    Informed Consent- Anesthetic plan and risks discussed with patient  I personally reviewed this patient with the CRNA  Discussed and agreed on the Anesthesia Plan with the CRNA  José Jordan

## 2018-02-22 NOTE — OP NOTE
OPERATIVE REPORT  PATIENT NAME: Chapis Resendiz    :  1995  MRN: [id number]  Pt Location: AN OR ROOM 01    SURGERY DATE: 2018    Surgeon(s) and Role:     Francis Painter MD - Primary     * Kacey Carrasquillo MD - Assisting    Preop Diagnosis:  Hypertrophy of breast [N62]    Post-Op Diagnosis Codes:     * Hypertrophy of breast [N62]    Procedure(s) (LRB):  BREAST REDUCTION (Bilateral)    Specimen(s):  ID Type Source Tests Collected by Time Destination   1 : left breast reduction  Tissue Breast, Left TISSUE EXAM Tone Amaral MD 2018 1249    2 : right breast reduction  Tissue Breast, Right TISSUE Prabhjot King MD 2018 1249        Estimated Blood Loss:   100 mL    Drains:       Anesthesia Type:   General    Operative Indications:  Hypertrophy of breast [N62]      Operative Findings:      Complications:   None    Procedure and Technique:  The patient was marked while standing in the preoperative holding area  The patient was brought to the operating room and placed supine on the operating table  A time-out procedure was performed  Sequential compression devices were applied  IV antibiotics were given  After adequate anesthesia was obtained, the chest was prepped and draped using standard surgical    technique  Attention was turned to the left breast  The patient had been marked with Wise pattern inferior pedicle technique with 8 cm vertical limbs  An 8 5 cm wide inferior-based pedicle was designed at the breast meridian  The pedicle was de-epithelialized after designing    a 38 mm nipple areolar cut out  The medial and lateral borders of the pedicle were dissected down to just superficial to the pectoral fascia  The medial and lateral triangles were excised  Following this, the superior border of the pedicle was divided down to the muscular fascia  At this point, the pedicle was examined  Excellent vascularity was noted at the distal aspect   At this point the decision was made to excise this superior portion of the Landis pattern  After this tumescent anesthesia was infiltrated into the lateral breast  Direct trimming of the superior breast flap was performed using a curved Pandya scissor to obtain healthy flap thickness  At this point liposuction using a 3 Western Simarn Mercedes style cannula was performed of the lateral breast for a total of 300mL  The direct excision of breast tissue was 818 grams  At this point, the wounds were copiously irrigated  Excellent hemostasis was achieved using electrocautery  The inferior pedicle was tacked to the pectoral fascia using 2-0 Vicryl suture  The vertical limbs were secured to the inframammary fold using 0 Prolene suture in half buried mattress technique  The skin was closed using 2-0 Vicryl, 3-0 Vicryl and 3-0 PDS suture in interrupted deep dermal technique  The superficial skin was closed using 3-0 Monocryl suture in running subcuticular technique  There was excellent vascularity of the nipple    at the end of the closure  Attention was turned to the right breast  The patient had been marked with Wise pattern inferior pedicle technique with 8 cm vertical limbs  An 9 5 cm wide inferior-based pedicle was designed at the breast meridian  The pedicle was de-epithelialized after designing    a 38 mm nipple areolar cut out  The medial and lateral borders of the pedicle were dissected down to just superficial to the pectoral fascia  The medial and lateral triangles were excised  Following this, the superior border of the pedicle was divided down to the muscular fascia  At this point, the pedicle was examined  Excellent vascularity was noted at the distal aspect  At this point the decision was made to excise this superior portion of the Landis pattern  After this tumescent anesthesia was infiltrated into the lateral breast  Direct trimming of the superior breast flap was performed using a curved Pandya scissor to obtain healthy flap thickness   At this point liposuction using a 3 Western Simran Mercedes style cannula was performed of the lateral breast for a total of 400mL  The direct excision of breast tissue was 604 grams  At this point, the wounds were copiously irrigated  Excellent hemostasis was achieved using electrocautery  The inferior pedicle was tacked to the pectoral fascia using 2-0 Vicryl suture  The vertical limbs were secured to the inframammary fold using 0 Prolene suture in half buried mattress technique  The skin was closed using 2-0 Vicryl, 3-0 Vicryl and 3-0 PDS suture in interrupted deep dermal technique  The superficial skin was closed using 3-0 Monocryl suture in running subcuticular technique  There was excellent vascularity of the nipples    at the end of the closure  There was excellent symmetry between both breasts  The wounds were cleaned and    dried  Benzoin, Steri-Strips and skin glue was applied followed by sterile gauze and a surgical bra  The patient tolerated the procedure well and was extubated in the operating room and taken to the recovery room stable condition        I was present for the entire procedure and A qualified resident physician was not available    Patient Disposition:  hemodynamically stable and extubated and stable    SIGNATURE: Qasim Bravo MD  DATE: February 22, 2018  TIME: 4:18 PM

## 2018-02-22 NOTE — ANESTHESIA POSTPROCEDURE EVALUATION
Post-Op Assessment Note      CV Status:  Stable    Mental Status:  Alert    Hydration Status:  Stable    PONV Controlled:  None    Airway Patency:  Patent            BP      Temp      Pulse     Resp      SpO2

## 2018-02-22 NOTE — DISCHARGE SUMMARY
Discharge Summary - Medical Armen Puente 23 y o  female MRN: [id number]    [facility] PACU Room / Bed: OR POOL/OR POOL Encounter: [id number]    BRIEF OVERVIEW  Admitting Provider: Comfort Quinn MD  Discharge Provider: Comfort Quinn MD  [facility] Care Phys[facility] at Discharge[phone]    Discharge To: Home      Admission Date: 2/22/2018     Discharge Date: No discharge date for patient encounter  Code Status: No Order  Advance Directive and Living Will: <no information>  Power of :        Primary Discharge Diagnosis  Active Problems:    * No active hospital problems  *  Resolved Problems:    * No resolved hospital problems   *        Discharge Disposition: Final discharge disposition not confirmed            [address] [place] [address]    Presenting Problem/History of Present Illness  <principal problem not specified>      Discharge Condition: stable    Patient tolerated the procedure well, recovered in PACU and was discharged home in stable condition    Comfort Quinn MD  2/22/2018  4:21 PM

## 2018-04-10 ENCOUNTER — OFFICE VISIT (OUTPATIENT)
Dept: FAMILY MEDICINE CLINIC | Facility: CLINIC | Age: 23
End: 2018-04-10
Payer: COMMERCIAL

## 2018-04-10 VITALS
TEMPERATURE: 99 F | RESPIRATION RATE: 16 BRPM | SYSTOLIC BLOOD PRESSURE: 114 MMHG | DIASTOLIC BLOOD PRESSURE: 74 MMHG | HEART RATE: 76 BPM

## 2018-04-10 DIAGNOSIS — J01.01 ACUTE RECURRENT MAXILLARY SINUSITIS: Primary | ICD-10-CM

## 2018-04-10 DIAGNOSIS — L70.0 ACNE VULGARIS: ICD-10-CM

## 2018-04-10 DIAGNOSIS — G57.12 MERALGIA PARESTHETICA OF LEFT SIDE: ICD-10-CM

## 2018-04-10 PROCEDURE — 99214 OFFICE O/P EST MOD 30 MIN: CPT | Performed by: FAMILY MEDICINE

## 2018-04-10 RX ORDER — AMOXICILLIN AND CLAVULANATE POTASSIUM 875; 125 MG/1; MG/1
1 TABLET, FILM COATED ORAL 2 TIMES DAILY WITH MEALS
Qty: 20 TABLET | Refills: 0 | Status: SHIPPED | OUTPATIENT
Start: 2018-04-10 | End: 2018-04-15 | Stop reason: SDUPTHER

## 2018-04-10 NOTE — PROGRESS NOTES
Assessment/Plan:    No problem-specific Assessment & Plan notes found for this encounter  Diagnoses and all orders for this visit:    Acute recurrent maxillary sinusitis  Comments:  Treating with Augmentin x 10 days, OTC Cough and Cold Preps PRN, rest, good PO hydration, and warm salt water gargles  Orders:  -     amoxicillin-clavulanate (AUGMENTIN) 875-125 mg per tablet; Take 1 tablet by mouth 2 (two) times a day with meals for 10 days    Meralgia paresthetica of left side  Comments:  Suspected to the left upper outer thigh - doubt lumbar related, or ITB syndrome  Pt to get back to regular exercise, and give this a little time  Acne vulgaris  Comments:  Discussed options at this time - trying Retin-A topically, possibly PO Doxycycline, etc   She will discussed with her PCP, Dr April Morel, at her appt next week  Subjective:      Patient ID: Gonzalo Lemon is a 23 y o  female  Sobeida has an ongoing numb patch to the upper outer left thigh - noticeable since breast reduction 2 months ago; and sits cross-legged quite a bit  No assoc back pain  Just getting back to exercise since her surgery - just cleared  Feverish, sinus headache, stuffy nose, headache, ST x 3 days    Also struggling with her acne some - this is despite using Benzoyl Peroxide with Clindamycin gel, other OTC cleansers, etc         The following portions of the patient's history were reviewed and updated as appropriate: allergies, current medications, past family history, past social history, past surgical history and problem list     Past Medical History:   Diagnosis Date    Asthma     exercise induced asthma    External hemorrhoids     last assessed 8/15/12; resolved 6/13/14    Fatigue     last assessed 4/8/13; resolved 6/13/14       Current Outpatient Prescriptions:     amoxicillin-clavulanate (AUGMENTIN) 875-125 mg per tablet, Take 1 tablet by mouth 2 (two) times a day with meals for 10 days, Disp: 20 tablet, Rfl: 0    norethindrone-ethinyl estradiol-iron (ESTROSTEP FE) 1-20/1-30/1-35 MG-MCG TABS, Take by mouth daily, Disp: , Rfl:     No Known Allergies      Review of Systems   Constitutional: Positive for fever  HENT: Positive for congestion, rhinorrhea, sinus pressure and sore throat  Respiratory: Positive for cough  Musculoskeletal: Negative for back pain  Paresthesias left upper outer thigh  Skin: Positive for rash  Objective:      /74 (BP Location: Left arm, Patient Position: Sitting, Cuff Size: Large)   Pulse 76   Temp 99 °F (37 2 °C) (Tympanic)   Resp 16          Physical Exam   Constitutional: She is oriented to person, place, and time  She appears well-developed and well-nourished  No distress  HENT:   Head: Normocephalic and atraumatic  Right Ear: Hearing, tympanic membrane, external ear and ear canal normal    Left Ear: Hearing and external ear normal    Nose: Mucosal edema present  Right sinus exhibits maxillary sinus tenderness  Left sinus exhibits maxillary sinus tenderness  Eyes: Conjunctivae are normal    Neck: Normal range of motion  Neck supple  No thyromegaly present  Cardiovascular: Normal rate, regular rhythm and normal heart sounds  Exam reveals no gallop and no friction rub  No murmur heard  Pulmonary/Chest: Effort normal and breath sounds normal  No respiratory distress  She has no wheezes  She has no rales  Musculoskeletal:   Spine benign on exam    Lymphadenopathy:     She has no cervical adenopathy  Neurological: She is alert and oriented to person, place, and time  Skin: She is not diaphoretic  Pt with open and closed comedones to her forehead, chin, and cheeks; no cysts at this time; no signs of secondary infection  Psychiatric: She has a normal mood and affect  Her behavior is normal  Judgment and thought content normal    Nursing note and vitals reviewed

## 2018-04-15 PROBLEM — K64.4 HEMORRHOIDS, EXTERNAL: Status: ACTIVE | Noted: 2017-04-21

## 2018-04-15 PROBLEM — M54.6 CHRONIC THORACIC BACK PAIN: Status: ACTIVE | Noted: 2017-11-09

## 2018-04-15 PROBLEM — G89.29 CHRONIC THORACIC BACK PAIN: Status: ACTIVE | Noted: 2017-11-09

## 2018-04-15 PROBLEM — R87.615 UNSATISFACTORY CERVICAL PAPANICOLAOU SMEAR: Status: ACTIVE | Noted: 2017-08-28

## 2018-04-15 RX ORDER — LEVONORGESTREL AND ETHINYL ESTRADIOL 0.1-0.02MG
1 KIT ORAL DAILY
Refills: 3 | COMMUNITY
Start: 2018-02-09 | End: 2018-06-06 | Stop reason: ALTCHOICE

## 2018-04-17 ENCOUNTER — OFFICE VISIT (OUTPATIENT)
Dept: FAMILY MEDICINE CLINIC | Facility: CLINIC | Age: 23
End: 2018-04-17
Payer: COMMERCIAL

## 2018-04-17 VITALS
HEART RATE: 68 BPM | DIASTOLIC BLOOD PRESSURE: 72 MMHG | SYSTOLIC BLOOD PRESSURE: 100 MMHG | HEIGHT: 63 IN | RESPIRATION RATE: 16 BRPM

## 2018-04-17 DIAGNOSIS — L70.0 ACNE VULGARIS: Primary | ICD-10-CM

## 2018-04-17 PROCEDURE — 99213 OFFICE O/P EST LOW 20 MIN: CPT | Performed by: FAMILY MEDICINE

## 2018-04-17 RX ORDER — DOXYCYCLINE 50 MG/1
50 CAPSULE ORAL 2 TIMES DAILY
Qty: 60 CAPSULE | Refills: 1 | Status: SHIPPED | OUTPATIENT
Start: 2018-04-17 | End: 2019-01-23 | Stop reason: SDUPTHER

## 2018-04-17 NOTE — PATIENT INSTRUCTIONS
Acne   WHAT YOU NEED TO KNOW:   Acne is a condition that causes red bumps, or pimples, to form on your skin  It is a long-term skin problem that is common in young adults  DISCHARGE INSTRUCTIONS:   Medicines: You may need any of the following:  · Topical treatments  are medicines that you put on your skin to kill germs, or to treat blackheads or whiteheads  Topicals may also reduce swelling or stop skin from peeling  They are available as gels, creams, pastes, liquids, and cleansers  · Antibiotics  may be given to treat an infection caused by bacteria  · Mild acids , such as salicylic or azelaic acid, help kill bacteria and improve your acne  · Hormone medicine  may help balance your hormone levels and reduce the amount of oil your pores make  · Retinoids  are prescription medicines to treat severe acne lesions  It is often used when other treatments do not work  You will need close follow-up if you take this medicine  Do not use this medicine if you are pregnant or breastfeeding  Retinoids may cause serious birth defects  Ask your healthcare provider for more information before you use this medicine  · Take your medicine as directed  Contact your healthcare provider if you think your medicine is not helping or if you have side effects  Tell him if you are allergic to any medicine  Keep a list of the medicines, vitamins, and herbs you take  Include the amounts, and when and why you take them  Bring the list or the pill bottles to follow-up visits  Carry your medicine list with you in case of an emergency  Use mild soap daily when you bathe: This will help control oiliness  Do not use harsh or drying soaps  Use oil-free lotion and sunscreen: This will help decrease irritation and keep your pores clear  Follow up with your healthcare provider as directed: You may need to return for regular blood tests  Write down your questions so you remember to ask them during your visits     Prevent acne: Use only the acne products that your healthcare provider recommends  Gels, solutions, cleansers, and medicated gauze pads may be used to reduce oily skin  Creams, ointments, and lotions are better if you have dry, sensitive skin  Continue to use these products as directed to prevent new acne  You may need to use your skin products less often if your skin gets irritated  You may need to stop using them until the irritation goes away  Contact your healthcare provider if:   · You have a fever and inflammation of your skin  · You are using retinoid medicine and you think you might be pregnant  · Your acne does not get better, even after treatment  · You have acne scars  · You begin to have mood swings or personality changes  · You have questions or concerns about your condition or care  © 2017 [address] is for End User's use only and may not be sold, redistributed or otherwise used for commercial purposes  All illustrations and images included in CareNotes® are the copyrighted property of [employer] D [employer]  or [employer]  The above information is an  only  It is not intended as medical advice for individual conditions or treatments  Talk to your doctor, nurse or pharmacist before following any medical regimen to see if it is safe and effective for you

## 2018-04-17 NOTE — PROGRESS NOTES
Assessment/Plan:    Acne vulgaris  Will try doxy  Also discuss ocp with gyn         Problem List Items Addressed This Visit     Acne vulgaris - Primary     Will try doxy  Also discuss ocp with gyn         Relevant Medications    doxycycline monohydrate (MONODOX) 50 mg capsule            Subjective:      Patient ID: Len Upton is a 23 y o  female  Here for severe cystic acne worse during period, improves once she gets her period  Called derm- told them to start with us first  Taking birth control- started since last may  Acne just flaring for the last couple months  Acne  Patient presents for evaluation of acne  Onset was several months ago  Symptoms have worsened, beginning 4 months ago  Lesions are described as cysts  Acne is primarily located on the face  The patient also reports severity of lesions varies with menstrual cycle  Treatment to date has included OTC- clindamycin and benzoly peroxide  The following portions of the patient's history were reviewed and updated as appropriate: allergies, current medications, past family history, past medical history, past social history, past surgical history and problem list     Review of Systems   Constitutional: Negative  HENT: Negative  Eyes: Negative  Respiratory: Negative  Cardiovascular: Negative  Gastrointestinal: Negative  Endocrine: Negative  Genitourinary: Negative  Musculoskeletal: Negative  Skin: Positive for rash (acne)  Neurological: Negative  Hematological: Negative  Psychiatric/Behavioral: Negative  Objective:      /72   Pulse 68   Resp 16   Ht 5' 3" (1 6 m)          Physical Exam   Constitutional: She appears well-developed and well-nourished  Eyes: Pupils are equal, round, and reactive to light  Neck: Normal range of motion  Neck supple  Pulmonary/Chest: Effort normal and breath sounds normal    Abdominal: Soft   Bowel sounds are normal    Skin: Rash (jaw line with acne- healing today) noted

## 2018-05-22 ENCOUNTER — APPOINTMENT (OUTPATIENT)
Dept: URGENT CARE | Age: 23
End: 2018-05-22

## 2018-05-22 ENCOUNTER — APPOINTMENT (OUTPATIENT)
Dept: LAB | Age: 23
End: 2018-05-22

## 2018-05-22 ENCOUNTER — TRANSCRIBE ORDERS (OUTPATIENT)
Dept: URGENT CARE | Age: 23
End: 2018-05-22

## 2018-05-22 DIAGNOSIS — Z02.1 PHYSICAL EXAM, PRE-EMPLOYMENT: ICD-10-CM

## 2018-05-22 DIAGNOSIS — Z02.1 PHYSICAL EXAM, PRE-EMPLOYMENT: Primary | ICD-10-CM

## 2018-05-22 LAB — RUBV IGG SERPL IA-ACNC: 18.3 IU/ML

## 2018-05-22 PROCEDURE — 86762 RUBELLA ANTIBODY: CPT

## 2018-05-22 PROCEDURE — 86735 MUMPS ANTIBODY: CPT

## 2018-05-22 PROCEDURE — 36415 COLL VENOUS BLD VENIPUNCTURE: CPT

## 2018-05-22 PROCEDURE — 86480 TB TEST CELL IMMUN MEASURE: CPT

## 2018-05-22 PROCEDURE — 86765 RUBEOLA ANTIBODY: CPT

## 2018-05-22 PROCEDURE — 86787 VARICELLA-ZOSTER ANTIBODY: CPT

## 2018-05-24 LAB
ANNOTATION COMMENT IMP: NORMAL
GAMMA INTERFERON BACKGROUND BLD IA-ACNC: 0.02 IU/ML
M TB IFN-G BLD-IMP: NEGATIVE
M TB IFN-G CD4+ BCKGRND COR BLD-ACNC: 0 IU/ML
M TB IFN-G CD4+ T-CELLS BLD-ACNC: 0.02 IU/ML
MEV IGG SER QL: NORMAL
MITOGEN IGNF BLD-ACNC: 6.35 IU/ML
MUV IGG SER QL: ABNORMAL
QUANTIFERON-TB GOLD IN TUBE: NORMAL
SERVICE CMNT-IMP: NORMAL
VZV IGG SER IA-ACNC: NORMAL

## 2018-06-04 ENCOUNTER — TELEPHONE (OUTPATIENT)
Dept: OBGYN CLINIC | Facility: CLINIC | Age: 23
End: 2018-06-04

## 2018-06-04 ENCOUNTER — OFFICE VISIT (OUTPATIENT)
Dept: FAMILY MEDICINE CLINIC | Facility: CLINIC | Age: 23
End: 2018-06-04
Payer: COMMERCIAL

## 2018-06-04 VITALS — RESPIRATION RATE: 14 BRPM | SYSTOLIC BLOOD PRESSURE: 100 MMHG | DIASTOLIC BLOOD PRESSURE: 62 MMHG | HEART RATE: 80 BPM

## 2018-06-04 DIAGNOSIS — L70.0 ACNE VULGARIS: Primary | ICD-10-CM

## 2018-06-04 DIAGNOSIS — E66.9 OBESITY WITHOUT SERIOUS COMORBIDITY, UNSPECIFIED CLASSIFICATION, UNSPECIFIED OBESITY TYPE: ICD-10-CM

## 2018-06-04 PROCEDURE — 99213 OFFICE O/P EST LOW 20 MIN: CPT | Performed by: FAMILY MEDICINE

## 2018-06-04 RX ORDER — DOXYCYCLINE 50 MG/1
CAPSULE ORAL
COMMUNITY
Start: 2018-05-19 | End: 2018-07-31 | Stop reason: ALTCHOICE

## 2018-06-04 NOTE — PATIENT INSTRUCTIONS
Acne   WHAT YOU NEED TO KNOW:   Acne is a condition that causes red bumps, or pimples, to form on your skin  It is a long-term skin problem that is common in young adults  DISCHARGE INSTRUCTIONS:   Medicines: You may need any of the following:  · Topical treatments  are medicines that you put on your skin to kill germs, or to treat blackheads or whiteheads  Topicals may also reduce swelling or stop skin from peeling  They are available as gels, creams, pastes, liquids, and cleansers  · Antibiotics  may be given to treat an infection caused by bacteria  · Mild acids , such as salicylic or azelaic acid, help kill bacteria and improve your acne  · Hormone medicine  may help balance your hormone levels and reduce the amount of oil your pores make  · Retinoids  are prescription medicines to treat severe acne lesions  It is often used when other treatments do not work  You will need close follow-up if you take this medicine  Do not use this medicine if you are pregnant or breastfeeding  Retinoids may cause serious birth defects  Ask your healthcare provider for more information before you use this medicine  · Take your medicine as directed  Contact your healthcare provider if you think your medicine is not helping or if you have side effects  Tell him if you are allergic to any medicine  Keep a list of the medicines, vitamins, and herbs you take  Include the amounts, and when and why you take them  Bring the list or the pill bottles to follow-up visits  Carry your medicine list with you in case of an emergency  Use mild soap daily when you bathe: This will help control oiliness  Do not use harsh or drying soaps  Use oil-free lotion and sunscreen: This will help decrease irritation and keep your pores clear  Follow up with your healthcare provider as directed: You may need to return for regular blood tests  Write down your questions so you remember to ask them during your visits     Prevent acne: Use only the acne products that your healthcare provider recommends  Gels, solutions, cleansers, and medicated gauze pads may be used to reduce oily skin  Creams, ointments, and lotions are better if you have dry, sensitive skin  Continue to use these products as directed to prevent new acne  You may need to use your skin products less often if your skin gets irritated  You may need to stop using them until the irritation goes away  Contact your healthcare provider if:   · You have a fever and inflammation of your skin  · You are using retinoid medicine and you think you might be pregnant  · Your acne does not get better, even after treatment  · You have acne scars  · You begin to have mood swings or personality changes  · You have questions or concerns about your condition or care  © 2017 [address] is for End User's use only and may not be sold, redistributed or otherwise used for commercial purposes  All illustrations and images included in CareNotes® are the copyrighted property of [employer]  or [employer]  The above information is an  only  It is not intended as medical advice for individual conditions or treatments  Talk to your doctor, nurse or pharmacist before following any medical regimen to see if it is safe and effective for you

## 2018-06-04 NOTE — PROGRESS NOTES
Assessment/Plan:    Problem List Items Addressed This Visit     Obesity     Referred to medical weight management         Relevant Orders    Ambulatory referral to Weight Management    Acne vulgaris - Primary     Cont with doxy  Seeing derm in july               Patient Instructions   Acne   WHAT YOU NEED TO KNOW:   Acne is a condition that causes red bumps, or pimples, to form on your skin  It is a long-term skin problem that is common in young adults  DISCHARGE INSTRUCTIONS:   Medicines: You may need any of the following:  · Topical treatments  are medicines that you put on your skin to kill germs, or to treat blackheads or whiteheads  Topicals may also reduce swelling or stop skin from peeling  They are available as gels, creams, pastes, liquids, and cleansers  · Antibiotics  may be given to treat an infection caused by bacteria  · Mild acids , such as salicylic or azelaic acid, help kill bacteria and improve your acne  · Hormone medicine  may help balance your hormone levels and reduce the amount of oil your pores make  · Retinoids  are prescription medicines to treat severe acne lesions  It is often used when other treatments do not work  You will need close follow-up if you take this medicine  Do not use this medicine if you are pregnant or breastfeeding  Retinoids may cause serious birth defects  Ask your healthcare provider for more information before you use this medicine  · Take your medicine as directed  Contact your healthcare provider if you think your medicine is not helping or if you have side effects  Tell him if you are allergic to any medicine  Keep a list of the medicines, vitamins, and herbs you take  Include the amounts, and when and why you take them  Bring the list or the pill bottles to follow-up visits  Carry your medicine list with you in case of an emergency  Use mild soap daily when you bathe: This will help control oiliness  Do not use harsh or drying soaps    Use oil-free lotion and sunscreen: This will help decrease irritation and keep your pores clear  Follow up with your healthcare provider as directed: You may need to return for regular blood tests  Write down your questions so you remember to ask them during your visits  Prevent acne:  Use only the acne products that your healthcare provider recommends  Gels, solutions, cleansers, and medicated gauze pads may be used to reduce oily skin  Creams, ointments, and lotions are better if you have dry, sensitive skin  Continue to use these products as directed to prevent new acne  You may need to use your skin products less often if your skin gets irritated  You may need to stop using them until the irritation goes away  Contact your healthcare provider if:   · You have a fever and inflammation of your skin  · You are using retinoid medicine and you think you might be pregnant  · Your acne does not get better, even after treatment  · You have acne scars  · You begin to have mood swings or personality changes  · You have questions or concerns about your condition or care  © 2017 [address] is for End User's use only and may not be sold, redistributed or otherwise used for commercial purposes  All illustrations and images included in CareNotes® are the copyrighted property of A D A M , Inc  or [employer]  The above information is an  only  It is not intended as medical advice for individual conditions or treatments  Talk to your doctor, nurse or pharmacist before following any medical regimen to see if it is safe and effective for you  Return if symptoms worsen or fail to improve  Subjective:      Patient ID: Sara Richardson is a 23 y o  female      Chief Complaint   Patient presents with    Follow-up     Patient here for 6 week follow up on Fatigue       Here for follow up with acne  Has appt with derm-July 16th  When she gets her period, skin clears ups  Seeing gyn- discussed changing meds to different [facility]        The following portions of the patient's history were reviewed and updated as appropriate: allergies, current medications, past family history, past medical history, past social history, past surgical history and problem list     Review of Systems   Constitutional: Negative  HENT: Negative  Eyes: Negative  Respiratory: Negative  Cardiovascular: Negative  Gastrointestinal: Negative  Endocrine: Negative  Genitourinary: Negative  Musculoskeletal: Negative  Skin:        Pustules on face, jaw line         Current Outpatient Prescriptions   Medication Sig Dispense Refill    VIENVA 0 1-20 MG-MCG per tablet Take 1 tablet by mouth daily  3    doxycycline monohydrate (MONODOX) 50 mg capsule        No current facility-administered medications for this visit  Objective:    /62   Pulse 80   Resp 14        Physical Exam   Constitutional: She appears well-developed and well-nourished  Eyes: Pupils are equal, round, and reactive to light  Neck: Normal range of motion  Neck supple  Pulmonary/Chest: Effort normal and breath sounds normal    Abdominal: Soft  Bowel sounds are normal    Skin:   Cystic acne on face   Nursing note and vitals reviewed               Carrie Jimenez DO

## 2018-06-06 ENCOUNTER — OFFICE VISIT (OUTPATIENT)
Dept: OBGYN CLINIC | Facility: CLINIC | Age: 23
End: 2018-06-06
Payer: COMMERCIAL

## 2018-06-06 VITALS
DIASTOLIC BLOOD PRESSURE: 80 MMHG | BODY MASS INDEX: 41.11 KG/M2 | SYSTOLIC BLOOD PRESSURE: 110 MMHG | WEIGHT: 232 LBS | HEIGHT: 63 IN

## 2018-06-06 DIAGNOSIS — Z30.09 COUNSELING FOR INITIATION OF BIRTH CONTROL METHOD: Primary | ICD-10-CM

## 2018-06-06 PROBLEM — R87.615 UNSATISFACTORY CERVICAL PAPANICOLAOU SMEAR: Status: RESOLVED | Noted: 2017-08-28 | Resolved: 2018-06-06

## 2018-06-06 PROCEDURE — 99213 OFFICE O/P EST LOW 20 MIN: CPT | Performed by: PHYSICIAN ASSISTANT

## 2018-06-06 RX ORDER — NORGESTIMATE AND ETHINYL ESTRADIOL 7DAYSX3 28
1 KIT ORAL DAILY
Qty: 28 TABLET | Refills: 2 | Status: SHIPPED | OUTPATIENT
Start: 2018-06-06 | End: 2018-08-07 | Stop reason: ALTCHOICE

## 2018-06-06 NOTE — PROGRESS NOTES
Assessment/Plan   Diagnoses and all orders for this visit:    Counseling for initiation of birth control method  -     norgestimate-ethinyl estradiol (ORTHO TRI-CYCLEN,TRINESSA) 0 18/0 215/0 25 MG-35 MCG per tablet; Take 1 tablet by mouth daily      Discussion  Desires alternate OCP so will plan to d/c current OCP and trial Ortho tri cyclen:   1)  Begin the pill this Sunday at which time would have started new pack of previous OCP, starting with the first pill in the packet  Take it the same time daily, within the same hour time frame (such as between 8 and 9am)  2) It common to experience some irregular bleeding when newly starting the pill  This should resolve after 3 months of use  Also minor side effects such as breast tenderness, minor headaches and nausea may occur, but typically resolve after continuing on the pill for at least 3 months  3)  Call if you experience severe headaches, visual disturbances, chest pain, shortness of breath, abdominal pain, pain tingling or weakness in arms or legs  4) Advise a back-up method, like condoms, during the first month on the OCP, if misses any pills, or is put on antibiotics  Reviewed missed pill protocol;   5) Advise safe sexual practices and the importance of condoms to prevent the transmission of STDs  6) Return visit in 2-3 months for pill & blood pressure check and APE  Subjective     HPI   Lida Rojas is a 23 y o  female who presents for a birth control discussion - desires a change in birth control - currently on vienva 1/20 - for the past 6 months to 1 year acne getting significantly getting worse - started on doxycycline by PCP who encouraged to follow-up with GYN to change in OCP prior to derm  LMP - 5/9/18; Periods are reg q 28 days and last 3 days; No excessive bleeding; No intermenstrual bleeding or spotting; Cramps are tolerable  No abd/pelvic pain or HAs;    History is negative for liver, gallbladder or thromboembolic disease or migraine headaches with aura    Pt is not currently in a sexual relationship; She declines std/hiv/hep testing; Feels safe at home  Current contraception: OCP  Condom use: yes    Last Pap - 8/28/17 - ASCUS (+) HRHPV type 16/18 neg; C/G/T negative  History of abnormal Pap smear: yes    Review of Systems   Constitutional: Positive for unexpected weight change (gradual increase in weight)  Negative for fatigue  Eyes: Negative for photophobia and visual disturbance  Respiratory: Negative for shortness of breath  Cardiovascular: Negative for chest pain and leg swelling  Gastrointestinal: Negative for abdominal distention, abdominal pain, constipation, diarrhea, nausea and vomiting  Genitourinary: Negative for menstrual problem, pelvic pain and vaginal bleeding  Neurological: Negative for headaches  Psychiatric/Behavioral: Negative for dysphoric mood  The patient is not nervous/anxious  The following portions of the patient's history were reviewed and updated as appropriate: allergies, current medications, past family history, past medical history, past social history, past surgical history and problem list           Past Medical History:   Diagnosis Date    Asthma     exercise induced asthma    External hemorrhoids     last assessed 8/15/12; resolved 6/13/14    Fatigue     last assessed 4/8/13; resolved 6/13/14       Past Surgical History:   Procedure Laterality Date    [place] REDUCTION OF LARGE BREAST Bilateral 2/22/2018    Procedure: BREAST REDUCTION;  Surgeon: Gonzalez Mckenna MD;  [facility];  Service: Plastics    WISDOM TOOTH EXTRACTION         Family History   Problem Relation Age of Onset    No Known Problems Mother     Diabetes Father     Breast cancer Maternal Aunt        Social History     Social History    Marital status: Single     Spouse name: N/A    Number of children: N/A    Years of education: N/A     Occupational History    Not on file       Social History Main Topics    Smoking status: Never Smoker    Smokeless tobacco: Never Used    Alcohol use Yes      Comment: socially - 1-2/week    Drug use: No    Sexual activity: Yes     Partners: Male     Other Topics Concern    Not on file     Social History Narrative    No narrative on file         Current Outpatient Prescriptions:     doxycycline monohydrate (MONODOX) 50 mg capsule, , Disp: , Rfl:     VIENVA 0 1-20 MG-MCG per tablet, Take 1 tablet by mouth daily, Disp: , Rfl: 3    No Known Allergies    Objective   Vitals:    06/06/18 0855   BP: 110/80   BP Location: Left arm   Patient Position: Sitting   Cuff Size: Large   Weight: 105 kg (232 lb)   Height: 5' 3" (1 6 m)     Physical Exam   Constitutional: She appears well-developed and well-nourished  No distress  Skin: She is not diaphoretic  Psychiatric: She has a normal mood and affect   Her behavior is normal

## 2018-07-31 ENCOUNTER — ANNUAL EXAM (OUTPATIENT)
Dept: OBGYN CLINIC | Facility: CLINIC | Age: 23
End: 2018-07-31
Payer: COMMERCIAL

## 2018-07-31 VITALS
SYSTOLIC BLOOD PRESSURE: 112 MMHG | DIASTOLIC BLOOD PRESSURE: 70 MMHG | WEIGHT: 234 LBS | BODY MASS INDEX: 41.46 KG/M2 | HEIGHT: 63 IN

## 2018-07-31 DIAGNOSIS — Z11.3 SCREENING FOR STD (SEXUALLY TRANSMITTED DISEASE): ICD-10-CM

## 2018-07-31 DIAGNOSIS — Z01.419 ENCNTR FOR GYN EXAM (GENERAL) (ROUTINE) W/O ABN FINDINGS: Primary | ICD-10-CM

## 2018-07-31 PROCEDURE — 99395 PREV VISIT EST AGE 18-39: CPT | Performed by: NURSE PRACTITIONER

## 2018-07-31 RX ORDER — DOXYCYCLINE HYCLATE 20 MG
1 TABLET ORAL DAILY
COMMUNITY
Start: 2018-07-18 | End: 2019-01-31

## 2018-07-31 RX ORDER — CLINDAMYCIN AND BENZOYL PEROXIDE 10; 50 MG/G; MG/G
1 GEL TOPICAL DAILY PRN
Refills: 2 | COMMUNITY
Start: 2018-07-16 | End: 2018-08-07 | Stop reason: ALTCHOICE

## 2018-07-31 RX ORDER — CLINDAMYCIN PHOSPHATE 10 UG/ML
1 LOTION TOPICAL EVERY MORNING
Refills: 3 | COMMUNITY
Start: 2018-07-16 | End: 2019-03-11 | Stop reason: ALTCHOICE

## 2018-07-31 RX ORDER — TRETINOIN 1 MG/G
1 CREAM TOPICAL DAILY
COMMUNITY
Start: 2018-07-19 | End: 2019-03-11 | Stop reason: ALTCHOICE

## 2018-07-31 NOTE — PROGRESS NOTES
Assessment/Plan   Diagnoses and all orders for this visit:    Encntr for gyn exam (general) (routine) w/o abn findings  -     GP PAP/CT/GC (Reflex HPV PLUS when ASC-US)  -     GP Trichomonas By Multiplex PCR    Screening for STD (sexually transmitted disease)  -     GP PAP/CT/GC (Reflex HPV PLUS when ASC-US)  -     GP Trichomonas By Multiplex PCR    Other orders  -     tretinoin (RETIN-A) 0 1 % cream; Apply 1 application topically daily  -     doxycycline (PERIOSTAT) 20 MG tablet; Take 1 tablet by mouth daily  -     clindamycin (CLEOCIN T) 1 % lotion; Apply 1 application topically every morning  -     clindamycin-benzoyl peroxide (BENZACLIN) gel; Apply 1 application topically daily as needed        Discussion    Reviewed with patient normal exam today  Pap with GC/CT/Trich done today  Normal breast exam today  Reviewed dry skin vs eczema  Pt states has noticed over last week or so  Has been placing lotion and vitamin E oil on area  Reviewed stop all new products can try OTC hydrocortisone cream and hypo-allergenic lotions or creams on breast  If does not resolve advise follow up with dermatologist    Monthly SBEs advised  Vitamin D and Calcim Supplements advised  Exercise most days of the week  Follow with PCP for regular check-ups and blood work  RTO 1 year for annual or sooner as needed  Beverly Nevarez is a 23 y o  female who presents for annual well woman exam    Last exam 7/18/18 Pap initial pap unsatisfactory on 7/18/18, repap was done on 8/28/18 ASCUS HPV non 16/18 positive, HPV 16 negative, HPV 18 negative  Pap guidelines reviewed with patient  Pt would like Pap today  Pt denies any abnormal vaginal discharge, itching, or odor  Pt is not in a sexual relationship, but would like STD testing today    Menstrual Cycle:  LMP: 6/28/18  Period Cycle (Days): 30  Period Duration (Days): 3  Period Pattern: Regular  Menstrual Flow: Moderate  Menstrual Control: Tampon  Dysmenorrhea: None  OB History      Para Term  AB Living    4 3 3   1 3    SAB TAB Ectopic Multiple Live Births        1 0 3    Contraception: Ortho Tri-cyclin, working well so far, has just finished first pack  Only side effect so far has been bloating  Has noticed an improvement in acne  Practices monthly SBEs, no breast complaints today  Denies any bowel or bladder issues  Pt follows with PCP for regular check-ups and blood work  Review of Systems   All other systems reviewed and are negative  The following portions of the patient's history were reviewed and updated as appropriate: allergies, current medications, past family history, past medical history, past social history, past surgical history and problem list     Past Medical History:   Diagnosis Date    Acne     Asthma     exercise induced asthma    External hemorrhoids     last assessed 8/15/12; resolved 14    Fatigue     last assessed 13; resolved 14       Past Surgical History:   Procedure Laterality Date    [place] REDUCTION OF LARGE BREAST Bilateral 2018    Procedure: BREAST REDUCTION;  Surgeon: Aldo Bobo MD;  [facility];  Service: Plastics    WISDOM TOOTH EXTRACTION         Family History   Problem Relation Age of Onset    No Known Problems Mother     Diabetes Father     Breast cancer Maternal Aunt        Social History     Social History    Marital status: Single     Spouse name: N/A    Number of children: N/A    Years of education: N/A     Occupational History    Not on file       Social History Main Topics    Smoking status: Never Smoker    Smokeless tobacco: Never Used    Alcohol use Yes      Comment: socially - 1-2/week    Drug use: No    Sexual activity: Yes     Partners: Male     Birth control/ protection: OCP     Other Topics Concern    Not on file     Social History Narrative    No narrative on file         Current Outpatient Prescriptions:     clindamycin (CLEOCIN T) 1 % lotion, Apply 1 application topically every morning, Disp: , Rfl: 3    clindamycin-benzoyl peroxide (BENZACLIN) gel, Apply 1 application topically daily as needed, Disp: , Rfl: 2    doxycycline (PERIOSTAT) 20 MG tablet, Take 1 tablet by mouth daily, Disp: , Rfl:     norgestimate-ethinyl estradiol (ORTHO TRI-CYCLEN,TRINESSA) 0 18/0 215/0 25 MG-35 MCG per tablet, Take 1 tablet by mouth daily, Disp: 28 tablet, Rfl: 2    tretinoin (RETIN-A) 0 1 % cream, Apply 1 application topically daily, Disp: , Rfl:     No Known Allergies    Objective   Vitals:    07/31/18 0810   BP: 112/70   BP Location: Left arm   Patient Position: Sitting   Cuff Size: Large   Weight: 106 kg (234 lb)   Height: 5' 3" (1 6 m)     Physical Exam   Constitutional: She is oriented to person, place, and time  She appears well-developed and well-nourished  HENT:   Head: Normocephalic  Neck: Normal range of motion  Neck supple  No tracheal deviation present  No thyromegaly present  Cardiovascular: Normal rate, regular rhythm and normal heart sounds  Pulmonary/Chest: Effort normal and breath sounds normal  Right breast exhibits skin change (dry patchy skin on lower portion of breasts)  Right breast exhibits no inverted nipple, no mass, no nipple discharge and no tenderness  Left breast exhibits skin change (dry patchy skin on lower portion of breasts)  Left breast exhibits no inverted nipple, no mass, no nipple discharge and no tenderness  Breasts are symmetrical    Abdominal: Soft  Bowel sounds are normal  She exhibits no distension and no mass  There is no tenderness  There is no rebound and no guarding  Genitourinary: Vagina normal and uterus normal  No breast swelling, tenderness, discharge or bleeding  No labial fusion  There is no rash, tenderness, lesion or injury on the right labia  There is no rash, tenderness, lesion or injury on the left labia  Cervix exhibits no motion tenderness, no discharge and no friability   Right adnexum displays no mass, no tenderness and no fullness  Left adnexum displays no mass, no tenderness and no fullness  Musculoskeletal: Normal range of motion  Neurological: She is alert and oriented to person, place, and time  Skin: Skin is warm and dry  Psychiatric: She has a normal mood and affect   Her behavior is normal  Judgment and thought content normal

## 2018-08-01 DIAGNOSIS — Z30.41 ENCOUNTER FOR SURVEILLANCE OF CONTRACEPTIVE PILLS: Primary | ICD-10-CM

## 2018-08-01 RX ORDER — LEVONORGESTREL AND ETHINYL ESTRADIOL 0.1-0.02MG
KIT ORAL
Qty: 84 TABLET | Refills: 3 | Status: SHIPPED | OUTPATIENT
Start: 2018-08-01 | End: 2019-08-14 | Stop reason: HOSPADM

## 2018-08-05 LAB
DEPRECATED C TRACH RRNA XXX QL PRB: NOT DETECTED
N GONORRHOEA DNA UR QL NAA+PROBE: NOT DETECTED
T VAGINALIS RRNA SPEC QL NAA+PROBE: NOT DETECTED
THIN PREP CVX: ABNORMAL

## 2018-08-06 NOTE — PROGRESS NOTES
Assessment/Plan:    Obesity, Class III, BMI 40-49 9 (morbid obesity) ([facility] )  -Discussed options of HealthyCORE-Intensive Lifestyle Intervention Program, Very Low Calorie Diet-VLCD, Conservative Program, Amari-En-Y Gastric Bypass and Vertical Sleeve Gastrectomy and the role of weight loss medications   -Initial weight loss goal of 5-10% weight loss for improved health  -Screening labs  Recommend checking lab coverage before having labs drawn  -Patient is interested in pursuing HealthyCORE-Intensive Lifestyle Intervention Program   -patient to follow up with pcp if chest pain occurs again  Follow up with nonsurgical dietician for healthycore start  Goals:  Food log (ie ) www myfitnesspal com,sparkpeople  com,loseit com,calorieking  com,etc  baritastic  No sugary beverages  At least 64oz of water daily  Increase physical activity by 10 minutes daily  Gradually increase physical activity to a goal of 5 days per week for 30 minutes of MODERATE intensity PLUS 2 days per week of FULL BODY resistance training      Diagnoses and all orders for this visit:    Obesity, Class III, BMI 40-49 9 (morbid obesity) ([facility] )  -     Lipid panel; Future  -     TSH, 3rd generation with T4 reflex; Future  -     Comprehensive metabolic panel; Future  -     Insulin, fasting; Future    Abnormal weight gain  -     Lipid panel; Future  -     TSH, 3rd generation with T4 reflex; Future  -     Comprehensive metabolic panel; Future  -     Insulin, fasting; Future          Subjective:   Chief Complaint   Patient presents with   [address] [place] [address] patient here for [facility] consult  Patient ID: Bhkati Marie  is a 23 y o  female with excess weight/obesity here to pursue weight management      Past Medical History:   Diagnosis Date    Acne     Asthma     exercise induced asthma    External hemorrhoids     last assessed 8/15/12; resolved 6/13/14    Fatigue     last assessed 4/8/13; resolved 6/13/14    Obesity        HPI:  Obesity/Excess Weight:  Severity: Severe  Onset:  Childhood  But particularly in the last two years  Modifiers: Diet and Exercise, Commercial Weight Loss Programs-ie  Weight Watchers, Marnette Lewangelicaowsky, Nutrisystem, etc  and Prescription Weight Loss Medications  Contributing factors: portion control, eats out alot, social drinker  Associated symptoms: decreased self esteem    Goals:to be below 200 pounds  Ideally 175  Hydration: 64 oz of water per day  1 cup of coffee with creamer  Alcohol: 6 drinks per week    The following portions of the patient's history were reviewed and updated as appropriate: allergies, current medications, past family history, past medical history, past social history, past surgical history and problem list     Review of Systems   HENT: Negative for sore throat  Respiratory: Negative for cough and shortness of breath  Cardiovascular: Negative for chest pain and palpitations  Gastrointestinal: Negative for abdominal pain, constipation, diarrhea, nausea and vomiting  Denies GERD  Did have a pain in her chest last week thought it may have been heart burn   Endocrine: Negative for cold intolerance and heat intolerance  Genitourinary: Negative for dysuria  Musculoskeletal: Negative for arthralgias and back pain  Skin: Positive for rash (on her breasts  thinks it is eczema )  Neurological: Negative for headaches  Psychiatric/Behavioral: Negative for suicidal ideas (denies HI)  Denies depression or anxiety       Objective:    /76 (BP Location: Left arm, Patient Position: Sitting, Cuff Size: Large)   Pulse 92   Temp 97 9 °F (36 6 °C) (Tympanic)   Resp 16   Ht 5' 3" (1 6 m)   Wt 105 kg (231 lb 1 6 oz)   BMI 40 94 kg/m²     Physical Exam   Nursing note and vitals reviewed  Constitutional   General appearance: Abnormal   well developed and morbidly obese  Eyes No conjunctival pallor     Ears, Nose, Mouth, and Throat Oral mucosa moist    Pulmonary   Respiratory effort: No increased work of breathing or signs of respiratory distress  Auscultation of lungs: Clear to auscultation, equal breath sounds bilaterally, no wheezes, no rales, no rhonci  Cardiovascular   Auscultation of heart: Normal rate and rhythm, normal S1 and S2, without murmurs  Examination of extremities for edema and/or varicosities: Normal   no edema  Abdomen   Abdomen: Abnormal   The abdomen was obese  Bowel sounds were normal  The abdomen was soft and nontender     Musculoskeletal   Gait and station: Normal     Psychiatric   Orientation to person, place and time: Normal     Affect: appropriate

## 2018-08-06 NOTE — ASSESSMENT & PLAN NOTE
-Discussed options of HealthyCORE-Intensive Lifestyle Intervention Program, Very Low Calorie Diet-VLCD, Conservative Program, Amari-En-Y Gastric Bypass and Vertical Sleeve Gastrectomy and the role of weight loss medications   -Initial weight loss goal of 5-10% weight loss for improved health  -Screening labs  Recommend checking lab coverage before having labs drawn  -Patient is interested in pursuing HealthyCORE-Intensive Lifestyle Intervention Program   -patient to follow up with pcp if chest pain occurs again

## 2018-08-07 ENCOUNTER — OFFICE VISIT (OUTPATIENT)
Dept: BARIATRICS | Facility: CLINIC | Age: 23
End: 2018-08-07
Payer: COMMERCIAL

## 2018-08-07 VITALS
HEIGHT: 63 IN | TEMPERATURE: 97.9 F | WEIGHT: 231.1 LBS | BODY MASS INDEX: 40.95 KG/M2 | SYSTOLIC BLOOD PRESSURE: 116 MMHG | HEART RATE: 92 BPM | DIASTOLIC BLOOD PRESSURE: 76 MMHG | RESPIRATION RATE: 16 BRPM

## 2018-08-07 DIAGNOSIS — R63.5 ABNORMAL WEIGHT GAIN: ICD-10-CM

## 2018-08-07 DIAGNOSIS — E66.01 OBESITY, CLASS III, BMI 40-49.9 (MORBID OBESITY) (HCC): Primary | ICD-10-CM

## 2018-08-07 PROCEDURE — 99204 OFFICE O/P NEW MOD 45 MIN: CPT | Performed by: PHYSICIAN ASSISTANT

## 2018-08-07 NOTE — PATIENT INSTRUCTIONS
Goals: Food log (ie ) www myfitnesspal com,sparkpeople  com,loseit com,calorieking  com,etc  baritastic  No sugary beverages  At least 64oz of water daily  Increase physical activity by 10 minutes daily   Gradually increase physical activity to a goal of 5 days per week for 30 minutes of MODERATE intensity PLUS 2 days per week of FULL BODY resistance training    Recommend checking lab coverage before having labs drawn

## 2018-08-24 ENCOUNTER — PROCEDURE VISIT (OUTPATIENT)
Dept: OBGYN CLINIC | Facility: CLINIC | Age: 23
End: 2018-08-24
Payer: COMMERCIAL

## 2018-08-24 VITALS
DIASTOLIC BLOOD PRESSURE: 76 MMHG | BODY MASS INDEX: 41.11 KG/M2 | SYSTOLIC BLOOD PRESSURE: 114 MMHG | WEIGHT: 232 LBS | HEIGHT: 63 IN

## 2018-08-24 DIAGNOSIS — R87.611 PAP SMEAR OF CERVIX WITH ASCUS, CANNOT EXCLUDE HGSIL: Primary | ICD-10-CM

## 2018-08-24 NOTE — PROGRESS NOTES
Beverly Rg is a 23 y o   female here for a colposcopy patient had 2018 Pap smear that showed ASCUS cannot rule out high grade, patient's prior Pap was 2017 that showed ASCUS positive non 16/18 high risk HPV  Patient is , using OCP for birth control, cycles are regular, coitarche EH 16, 10 lifetime partners, positive non 16/18 high risk HPV no other STD history, no ORTIZ exposure, no family history, no smoking  Personal health questionnaire reviewed: yes  Gynecologic History  Contraception: OCP (estrogen/progesterone)  Last Pap:  2018  Results were: abnormal      Obstetric History  OB History    Para Term  AB Living   4 3 3   1 3   SAB TAB Ectopic Multiple Live Births       1 0 3               The following portions of the patient's history were reviewed and updated as appropriate: allergies, current medications, past family history, past medical history, past social history, past surgical history and problem list     Review of Systems  Review of Systems   Constitutional: Negative for chills, fatigue, fever and unexpected weight change  HENT: Negative for dental problem, sinus pain and sinus pressure  Eyes: Negative for visual disturbance  Respiratory: Negative for cough, shortness of breath and wheezing  Cardiovascular: Negative for chest pain and leg swelling  Gastrointestinal: Negative for constipation, diarrhea, nausea and vomiting  Genitourinary: Negative for menstrual problem, pelvic pain and urgency  Musculoskeletal: Negative for back pain  Allergic/Immunologic: Negative for environmental allergies  Neurological: Negative for dizziness and headaches          Objective     /76 (BP Location: Left arm, Patient Position: Sitting, Cuff Size: Large)   Ht 5' 3" (1 6 m)   Wt 105 kg (232 lb)   LMP 2018   BMI 41 10 kg/m²   General appearance: alert and oriented, in no acute distress  Pelvic: external genitalia normal, vagina normal without discharge and cervix normal in appearance     Colposcopy  Date/Time: 2018 9:08 PM  Performed by: Kellie Archuleta  Authorized by: Kellie Archuleta     Consent:     Consent obtained:  Verbal and written    Consent given by:  Patient    Patient questions answered: yes      Patient agrees, verbalizes understanding, and wants to proceed: yes    Pre-procedure:     Prepped with: acetic acid    Indication:     Indication:  ASC-H  Procedure:     Procedure: Colposcopy w/ cervical biopsy and ECC      Haiku speculum was placed in the vagina: yes      Under colposcopic examination the transition zone was seen in entirety: yes      Endocervix was curetted using a Kevorkian curette: yes      Cervical biopsy performed with a cervical biopsy punch: yes      Monsel's solution was applied: yes      Biopsy(s): yes      Location:  12 o'clock and ecc    Specimen to pathology: yes    Post-procedure:     Findings: White epithelium      Impression: Low grade cervical dysplasia      Patient tolerance of procedure: Tolerated well, no immediate complications  Comments:      Some acetowhite over anterior lip of cervix, some also puctate along lower lip, good hemostasis with monsels, patient tolerated well  Assessment    23year-old  with ASCUS cannot rule out high-grade here for colposcopy  Patient tolerated biopsy at 12 o'clock and ECC well     Plan    Return based on results

## 2018-08-31 LAB
BIOPSY SPEC-IMP: NORMAL
BIOPSY SPEC-IMP: NORMAL
WOMEN'S HEALTH PATHOLOGY REPORT: ABNORMAL

## 2018-08-31 PROCEDURE — 57454 BX/CURETT OF CERVIX W/SCOPE: CPT | Performed by: OBSTETRICS & GYNECOLOGY

## 2018-09-04 NOTE — PROGRESS NOTES
Weight Management Medical Nutrition Assessment  Sobeida presented today a new start to the [facility] program   She currently weigh 234# with a goal weight of <200# for the short term goal   Her long term goal is 175#  She is very active but does not always make the best food choices  We discussed healthy options and portion sizes  She also drinks approximately 6 mixed drinks per week  We talked about how tracking her food would help her to see how many caloris that she is eating and drinking  She agree to start tracking her food and also to limit how much alcohol she is drinking  Anthropometric Measurements  Start Weight (lbs): 231 6#  Current Weight (lbs): 234#  Ideal Body Weight (lbs) 115  Goal Weight (lbs):  STG - <200, LTG  175#    Weight Loss History  Previous weight loss attempts: Various attempts using commercial programs like [employer] etc     Food and Nutrition Related History      Food Recall  Breakfast:  Bagel thin, or eggs    Snack: none  Lunch: sandwich or left overs  Snack: rice cakes  Dinner: [place] ([facility]) burger, salad  Snack: ice cream      Beverages: water, coffee, alcohol socially (few times per weeka0  Volume of beverage intake: 60oz    Weekends: Worse  Cravings:  sweet  Trouble area of day: night    Frequency of Eating out: 2x per week  Food restrictions: none  Cooking: self   Food Shopping: self    Physical Activity Intake  Activity:coaches cheerleading, stretching, gym  3 times per week  Frequency:  Multiple times per week  Physical limitations/barriers to exercise: none    Estimated Needs  Energy    [employer] Energy Needs: BMR : 6413  1-2# loss weekly sedentary:  8484-5717       1-2# loss weekly lightly active: 7857-9696  Protein:  62 - 78gr    (1 2-1 5g/kg IBW)  Fluid:   64oz     (35mL/kg IBW)    Nutrition Diagnosis  Yes;     Overweight/obesity  related to Excess energy intake as evidenced by  BMI more than normative standard for age and sex (obesity-grade III 40+) Nutrition Intervention    Nutrition Prescription  Calories:  1200 when sedentary and 1400 if active     Protein:75 grams  Fluid:  64 oz      Nutrition Education:    Healthy [facility] Manual  Calorie controlled menu  Lean protein food choices  Healthy snack options  Food journaling tips      Nutrition Counseling:  Strategies: meal planning, portion sizes, healthy snack choices, hydration, fiber intake, protein intake, exercise, food journal      Monitoring and Evaluation:  Evaluation criteria:  Energy Intake  Meet protein needs  Maintain adequate hydration  Monitor weekly weight  Meal planning/preparation  Food journal   Decreased portions at mealtimes and snacks  Physical activity     Barriers to learning:none  Readiness to change: Action  Comprehension: good  Expected Compliance: good

## 2018-09-05 ENCOUNTER — OFFICE VISIT (OUTPATIENT)
Dept: BARIATRICS | Facility: CLINIC | Age: 23
End: 2018-09-05

## 2018-09-05 VITALS — WEIGHT: 234 LBS | HEIGHT: 63 IN | BODY MASS INDEX: 41.46 KG/M2

## 2018-09-05 DIAGNOSIS — R63.5 ABNORMAL WEIGHT GAIN: ICD-10-CM

## 2018-09-05 PROCEDURE — WMPRO12

## 2018-09-13 ENCOUNTER — CLINICAL SUPPORT (OUTPATIENT)
Dept: BARIATRICS | Facility: CLINIC | Age: 23
End: 2018-09-13

## 2018-09-13 VITALS — BODY MASS INDEX: 41.57 KG/M2 | WEIGHT: 234.6 LBS | HEIGHT: 63 IN

## 2018-09-13 DIAGNOSIS — R63.5 ABNORMAL WEIGHT GAIN: Primary | ICD-10-CM

## 2018-09-13 PROCEDURE — RECHECK

## 2018-09-19 NOTE — PROGRESS NOTES
Weight Management Medical Nutrition Assessment  Jaxon Wade is here for f/u  Current weight  234 4 # , stable x 2 wks (currently with cycle and reports she typically gains)  Tracking and consuming ~1300 calories/day but does notice that some days she is eating <1000 calories/day  Discussed importance of adequate calories for exercise  Drinking less alcohol than prior and making smarter choices  Questions addressed re: protein shakes and samples of our products provided  Behavioral questionnaire addressed re: purging  Denies that this is a current problem  She did ask if VLCD would be a possibility  Discussed that this could be an avenue to try however she is very active and social which may make that pathway unrealistic  She will let us know if she would like to try this  Anthropometric Measurements  Start Weight (lbs): 234 4#  Current Weight (lbs): 234 4#  Ideal Body Weight (lbs) 115  Goal Weight (lbs):  STG - <200, LTG  175#    Weight Loss History  Previous weight loss attempts: Various attempts using commercial programs like [employer]     Food and Nutrition Related History      Food Recall  Breakfast: hard boiled egg, triple zero (190, 20)     Snack: none  Lunch:  3 oz chicken, avocado mash, sometimes veggies  Snack: 10 pretzel crisps, 1/2 avocado mash OR string cheese, fruit  Dinner: carb smart wrap with chicken, cheese  Snack:  Skip     Beverages: water, coffee, alcohol socially (12 servings/month)  Volume of beverage intake: 32-64oz    Weekends: Worse  Cravings:  sweet  Trouble area of day: night    Frequency of Eating out: 2x per week  Food restrictions: none  Cooking: self   Food Shopping: self    Physical Activity Intake  Activity:coaches cheerleading, stretching, gym  3 times per week  Frequency:  Multiple times per week  Physical limitations/barriers to exercise: none    Estimated Needs  Energy    [employer] Needs:  BMR : 4041  1-2# loss weekly sedentary:  0397-2379       1-2# loss weekly lightly active: 9971-6761  Protein:  62 - 78gr    (1 2-1 5g/kg IBW)  Fluid:   64oz     (35mL/kg IBW)    Nutrition Diagnosis  Yes; Overweight/obesity  related to Excess energy intake as evidenced by  BMI more than normative standard for age and sex (obesity-grade III 36+)       Nutrition Intervention    Nutrition Prescription  Calories:  1100 when sedentary and 7968-9135 if active     Protein:75 grams  Fluid:  64 oz      Nutrition Education:    Healthy [facility] Manual  Calorie controlled menu  Lean protein food choices  Healthy snack options  Food journaling tips      Nutrition Counseling:  Strategies: meal planning, portion sizes, healthy snack choices, hydration, fiber intake, protein intake, exercise, food journal      Monitoring and Evaluation:  Evaluation criteria:  Energy Intake  Meet protein needs  Maintain adequate hydration  Monitor weekly weight  Meal planning/preparation  Food journal   Decreased portions at mealtimes and snacks  Physical activity     Barriers to learning:none  Readiness to change: Action  Comprehension: good  Expected Compliance: good

## 2018-09-20 ENCOUNTER — CLINICAL SUPPORT (OUTPATIENT)
Dept: BARIATRICS | Facility: CLINIC | Age: 23
End: 2018-09-20

## 2018-09-20 ENCOUNTER — OFFICE VISIT (OUTPATIENT)
Dept: BARIATRICS | Facility: CLINIC | Age: 23
End: 2018-09-20

## 2018-09-20 VITALS — BODY MASS INDEX: 41.53 KG/M2 | WEIGHT: 234.4 LBS | HEIGHT: 63 IN

## 2018-09-20 DIAGNOSIS — R63.5 ABNORMAL WEIGHT GAIN: Primary | ICD-10-CM

## 2018-09-20 PROCEDURE — RECHECK

## 2018-09-27 ENCOUNTER — CLINICAL SUPPORT (OUTPATIENT)
Dept: BARIATRICS | Facility: CLINIC | Age: 23
End: 2018-09-27

## 2018-09-27 VITALS — WEIGHT: 235.4 LBS | BODY MASS INDEX: 41.71 KG/M2 | HEIGHT: 63 IN

## 2018-09-27 DIAGNOSIS — R63.5 ABNORMAL WEIGHT GAIN: Primary | ICD-10-CM

## 2018-09-27 PROCEDURE — RECHECK

## 2018-10-01 ENCOUNTER — OFFICE VISIT (OUTPATIENT)
Dept: OBGYN CLINIC | Facility: CLINIC | Age: 23
End: 2018-10-01
Payer: COMMERCIAL

## 2018-10-01 VITALS
SYSTOLIC BLOOD PRESSURE: 122 MMHG | WEIGHT: 235 LBS | HEIGHT: 63 IN | DIASTOLIC BLOOD PRESSURE: 76 MMHG | BODY MASS INDEX: 41.64 KG/M2

## 2018-10-01 DIAGNOSIS — N87.1 DYSPLASIA OF CERVIX, HIGH GRADE CIN 2: Primary | ICD-10-CM

## 2018-10-01 PROCEDURE — 99213 OFFICE O/P EST LOW 20 MIN: CPT | Performed by: OBSTETRICS & GYNECOLOGY

## 2018-10-01 NOTE — PROGRESS NOTES
Subjective     Nicolas Mata is a 23 y o   female here for follow-up of colposcopy results  Patient reports no problems since colposcopy  Fully reviewed with patient SALTY 2 with normal ECC  Options are for LEEP verses 6 month colposcopy and  Pap for a year  Reviewed and discussed with patient patient desires to proceed with the observation of colposcopy and Pap  Personal health questionnaire reviewed: yes  Gynecologic History  Patient's last menstrual period was 2018  Contraception: OCP (estrogen/progesterone)      Obstetric History  OB History    Para Term  AB Living   4 3 3   1 3   SAB TAB Ectopic Multiple Live Births       1 0 3               The following portions of the patient's history were reviewed and updated as appropriate: allergies, current medications, past family history, past medical history, past social history, past surgical history and problem list     Review of Systems  Review of Systems   Constitutional: Positive for unexpected weight change  Negative for chills, fatigue and fever  HENT: Negative for dental problem, sinus pain and sinus pressure  Eyes: Negative for visual disturbance  Respiratory: Negative for cough, shortness of breath and wheezing  Cardiovascular: Negative for chest pain and leg swelling  Gastrointestinal: Negative for constipation, diarrhea, nausea and vomiting  Genitourinary: Negative for menstrual problem, pelvic pain and urgency  Musculoskeletal: Negative for back pain  Allergic/Immunologic: Negative for environmental allergies  Neurological: Negative for dizziness and headaches  Objective     /76 (BP Location: Left arm, Patient Position: Sitting, Cuff Size: Large)   Ht 5' 3" (1 6 m)   Wt 107 kg (235 lb)   LMP 2018   BMI 41 63 kg/m²   General appearance: alert and oriented, in no acute distress      Assessment  66-year-old  with SALTY 2 on the ectocervix    Patient has decided to initiate q 6 months colpo and Pap for this initial year  Plan  Return in 6 months for colposcopy and Pap

## 2018-10-04 ENCOUNTER — CLINICAL SUPPORT (OUTPATIENT)
Dept: BARIATRICS | Facility: CLINIC | Age: 23
End: 2018-10-04

## 2018-10-04 VITALS — WEIGHT: 234 LBS | BODY MASS INDEX: 41.46 KG/M2 | HEIGHT: 63 IN

## 2018-10-04 DIAGNOSIS — R63.5 ABNORMAL WEIGHT GAIN: Primary | ICD-10-CM

## 2018-10-04 PROCEDURE — RECHECK

## 2018-10-18 ENCOUNTER — CLINICAL SUPPORT (OUTPATIENT)
Dept: BARIATRICS | Facility: CLINIC | Age: 23
End: 2018-10-18

## 2018-10-18 VITALS — HEIGHT: 63 IN | BODY MASS INDEX: 41.71 KG/M2 | WEIGHT: 235.4 LBS

## 2018-10-18 DIAGNOSIS — R63.5 ABNORMAL WEIGHT GAIN: Primary | ICD-10-CM

## 2018-10-18 PROCEDURE — RECHECK

## 2018-10-25 ENCOUNTER — CLINICAL SUPPORT (OUTPATIENT)
Dept: BARIATRICS | Facility: CLINIC | Age: 23
End: 2018-10-25

## 2018-10-25 VITALS — HEIGHT: 63 IN | WEIGHT: 234.8 LBS | BODY MASS INDEX: 41.6 KG/M2

## 2018-10-25 DIAGNOSIS — R63.5 ABNORMAL WEIGHT GAIN: Primary | ICD-10-CM

## 2018-10-25 PROCEDURE — RECHECK

## 2018-11-01 ENCOUNTER — CLINICAL SUPPORT (OUTPATIENT)
Dept: BARIATRICS | Facility: CLINIC | Age: 23
End: 2018-11-01

## 2018-11-01 VITALS — WEIGHT: 236.4 LBS | BODY MASS INDEX: 41.89 KG/M2 | HEIGHT: 63 IN

## 2018-11-01 DIAGNOSIS — R63.5 ABNORMAL WEIGHT GAIN: Primary | ICD-10-CM

## 2018-11-01 PROCEDURE — RECHECK

## 2018-11-08 ENCOUNTER — CLINICAL SUPPORT (OUTPATIENT)
Dept: BARIATRICS | Facility: CLINIC | Age: 23
End: 2018-11-08

## 2018-11-08 ENCOUNTER — OFFICE VISIT (OUTPATIENT)
Dept: BARIATRICS | Facility: CLINIC | Age: 23
End: 2018-11-08

## 2018-11-08 VITALS — BODY MASS INDEX: 41.85 KG/M2 | HEIGHT: 63 IN | WEIGHT: 236.2 LBS

## 2018-11-08 VITALS — WEIGHT: 236.4 LBS | BODY MASS INDEX: 41.89 KG/M2 | HEIGHT: 63 IN

## 2018-11-08 DIAGNOSIS — R63.5 ABNORMAL WEIGHT GAIN: Primary | ICD-10-CM

## 2018-11-08 PROCEDURE — RECHECK

## 2018-11-08 NOTE — PROGRESS NOTES
Weight Management Medical Nutrition Assessment  Susana Olivares is here for f/u  Current weight  236 4 # , overall gain of 2 lbs x 2 months  Frustrated with lack of change  Tanita performed due to no weight loss and shows fat gain of 8 lbs with muscle loss of 6 lbs  Fluid difference of 4 2 lbs  Discussed that while these results might be skewed it is also a concern that she is not getting adequate protein  She is tracking but not as diligently and feels she is consuming ~1300 calories/day on rest with 1400 on exercise  Suggest she decrease calories to 1100 on rest days and incorporate a meal replacement for increase protein  She is agreeable to try this  Encouraged her to have labs drawn from October  New lab slip provided  She is asking questions re: intermittent/time restricted fasting as she has done this in the past with success  Recommend she try the above interventions first and then we can look into intermittent fasting  Explained that more research is needed in this area  She continues to exercise 2-4x/wk       Anthropometric Measurements  Start Weight (lbs): 234 4#  Current Weight (lbs): 236 4#  Ideal Body Weight (lbs) 115  Goal Weight (lbs):  STG - <200, LTG  175#    Weight Loss History  Previous weight loss attempts: Various attempts using commercial programs like [employer] etc     Food and Nutrition Related History      Food Recall  Breakfast: 9:00 a m  4 oz egg white, veggies, shredded cheese, sometimes slice of ham, toast OR cheese stick, triple zero (190)    Snack: none  Lunch:12-1:  S/w turkey, tzaiki ranch, cheese, hummus or rabago OR vegetable blend quinoa, chicken  Snack:  3:30 string cheese, fruit or yogurt (skips 4x/wk)  Dinner:4-7: chicken, black bean salsa OR s/w OR lean cuisine chicken flatbread or pizza  Snack:  Skip     Beverages: water, coffee, alcohol socially   Volume of beverage intake:  64 oz, 6 drinks/wk    Weekends: Worse  Cravings:  sweet  Trouble area of day: night    Frequency of Eating out: 1x per week  Food restrictions: none  Cooking: self   Food Shopping: self    Physical Activity Intake  Activity:coaches cheerleading, stretching, 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 per week  Frequency:  Multiple times per week  Physical limitations/barriers to exercise: none    Estimated Needs  Energy    [employer] Energy Needs: BMR : 9691  1-2# loss weekly sedentary:  4866-6109       1-2# loss weekly lightly active: 1837-7915  Protein:  62 - 78gr    (1 2-1 5g/kg IBW)  Fluid:   64oz     (35mL/kg IBW)    Nutrition Diagnosis  Yes; Overweight/obesity  related to Excess energy intake as evidenced by  BMI more than normative standard for age and sex (obesity-grade III 36+)       Nutrition Intervention    Nutrition Prescription  Calories:  1100 when sedentary and 5066-4155 if active     Protein:75 grams  Fluid:  64 oz      Nutrition Education:    Healthy Core Manual  Calorie controlled menu  Lean protein food choices  Healthy snack options  Food journaling tips      Nutrition Counseling:  Strategies: meal planning, portion sizes, healthy snack choices, hydration, fiber intake, protein intake, exercise, food journal      Monitoring and Evaluation:  Evaluation criteria:  Energy Intake  Meet protein needs  Maintain adequate hydration  Monitor weekly weight  Meal planning/preparation  Food journal   Decreased portions at mealtimes and snacks  Physical activity     Barriers to learning:none  Readiness to change: Action  Comprehension: good  Expected Compliance: good

## 2018-11-12 ENCOUNTER — TELEPHONE (OUTPATIENT)
Dept: BARIATRICS | Facility: CLINIC | Age: 23
End: 2018-11-12

## 2018-11-12 NOTE — TELEPHONE ENCOUNTER
Please call patient and inform her that I received her lab results  Her cmp, lipid and tsh are within acceptable limits  Her fasting insulin is elevated  Recommend the patient follow a low fat, low cholesterol diet, limiting refined carbohydrates like white bread, white rice, white pasta, chips/pretzels, sweets/desserts, and sugary beverages  Increase fruits/vegetables  Increase exercise as tolerated  Result note: cmp, tsh and lipid within acceptable limits  Fasting insulin elevated at 18 1   Recommended lifestyle changes   Could consider metformin    [facility] from [facility]  To be scanned into epic

## 2018-11-20 ENCOUNTER — CLINICAL SUPPORT (OUTPATIENT)
Dept: BARIATRICS | Facility: CLINIC | Age: 23
End: 2018-11-20

## 2018-11-20 VITALS — WEIGHT: 236.6 LBS | BODY MASS INDEX: 41.92 KG/M2 | HEIGHT: 63 IN

## 2018-11-20 DIAGNOSIS — R63.5 ABNORMAL WEIGHT GAIN: Primary | ICD-10-CM

## 2018-11-20 PROCEDURE — RECHECK

## 2018-12-06 ENCOUNTER — CLINICAL SUPPORT (OUTPATIENT)
Dept: BARIATRICS | Facility: CLINIC | Age: 23
End: 2018-12-06

## 2018-12-06 VITALS — HEIGHT: 63 IN | WEIGHT: 235.4 LBS | BODY MASS INDEX: 41.71 KG/M2

## 2018-12-06 DIAGNOSIS — R63.5 ABNORMAL WEIGHT GAIN: Primary | ICD-10-CM

## 2018-12-06 PROCEDURE — RECHECK

## 2018-12-14 ENCOUNTER — OFFICE VISIT (OUTPATIENT)
Dept: BARIATRICS | Facility: CLINIC | Age: 23
End: 2018-12-14

## 2018-12-14 VITALS — BODY MASS INDEX: 41.85 KG/M2 | WEIGHT: 236.2 LBS | HEIGHT: 63 IN

## 2018-12-14 DIAGNOSIS — R63.5 ABNORMAL WEIGHT GAIN: Primary | ICD-10-CM

## 2018-12-14 PROCEDURE — RECHECK

## 2018-12-14 NOTE — PROGRESS NOTES
Weight Management Medical Nutrition Assessment  Sobeida is here for f/u w/tanita and REE  Current weight  236 2 # , overall gain of 1 8 lbs x 3 months  Tanita compared to one performed last month which shows favorable changes with gain of 0 6 lb muscle and loss of 0 8 lb fat  REE 6% >predicted  Measured 1973 kcal vs  1859 kcal predicted   She did have her labs drawn and insulin is noted to be elevated  She would like to discuss use of metformin with PA at upcoming visit  Trying 2 days of intermittent fasting/time restriction (10 a m -8 p m , 600-800 calories)  Continues to struggle with food logging but this is a goal of hers  Based on recall I still believe she is consuming inadequate calories, especially now that she has started exercising (running/walking)  She is struggling with understanding that less is not always more  Discuss ways to look at this more scientifically rather than personalizing it  This seemed to help  She has 2 more classes and then will likely switch to bundles       Anthropometric Measurements  Start Weight (lbs): 234 4#  Current Weight (lbs): 236 2#  Ideal Body Weight (lbs) 115  Goal Weight (lbs):  STG - <200, LTG  175#    Weight Loss History  Previous weight loss attempts: Various attempts using commercial programs like [address] etc     Food and Nutrition Related History      Food Recall  Breakfast: 9:00 a m  2 hard boiled eggs  (140, 10)   Snack: none  Lunch:12-1:  S/w w/light bread, 3 oz turkey, veggies, tzaiki ranch, 1 cup popcorn (250, ~15) OR on fasting day: chicken breast, brussels or mash cauliflower (~320, 35)  Snack:  3:30 string cheese, fruit (120, 6) OR fasting day: string cheese  Dinner:4-7: ~4-5 oz chicken, veggies, sliced potatoes  (~1 serving) (370, 28-35) OR fasting day: caprese salad w/3 slices mozzeralla (264, 5)>>>addressed   Snack:  Skip     Beverages: water, coffee, alcohol socially   Volume of beverage intake:  64 oz, no alcohol in last few weeks    Weekends: Worse  Cravings:  sweet  Trouble area of day: night    Frequency of Eating out: 1x per week  Food restrictions: none  Cooking: self   Food Shopping: self    Physical Activity Intake  Activity: walking 4 min, running 1 min: 30 minutes  Frequency:  2x/wk  Physical limitations/barriers to exercise: none    Estimated Needs  Energy  Christ BMR 1799x1  3-2061=4003  REE 1973 kcal, wit loss w/o exercise 7344-2852;  wt loss w/exercise 9851-0552  [employer] Energy Needs: BMR : 2201  1-2# loss weekly sedentary:  3183-0410       1-2# loss weekly lightly active: 2049-2186  Protein:  62 - 78gr    (1 2-1 5g/kg IBW)  Fluid:   64oz     (35mL/kg IBW)    Nutrition Diagnosis  Yes; Overweight/obesity  related to Excess energy intake as evidenced by  BMI more than normative standard for age and sex (obesity-grade III 36+)       Nutrition Intervention    Nutrition Prescription  Calories:  1771-9093 calories, may stay 600-800 on fasting days     Protein:75 grams  Fluid:  64 oz      Nutrition Education:    Healthy Core Manual  Calorie controlled menu  Lean protein food choices  Healthy snack options  Food journaling tips      Nutrition Counseling:  Strategies: meal planning, portion sizes, healthy snack choices, hydration, fiber intake, protein intake, exercise, food journal, metabolism      Monitoring and Evaluation:  Evaluation criteria:  Energy Intake  Meet protein needs  Maintain adequate hydration  Monitor weekly weight  Meal planning/preparation  Food journal   Decreased portions at mealtimes and snacks  Physical activity     Barriers to learning:none  Readiness to change: Action  Comprehension: good  Expected Compliance: good

## 2018-12-18 NOTE — PROGRESS NOTES
Assessment/Plan:    No problem-specific Assessment & Plan notes found for this encounter  Diagnoses and all orders for this visit:    Obesity, Class III, BMI 40-49 9 (morbid obesity) ([facility] [address] )  As below  Starting metformin  Discussed common side effects  Call if issues or questions  Hyperinsulinemia  -     metFORMIN (GLUCOPHAGE-XR) 500 mg 24 hr tablet; Take 1 tablet (500 mg total) by mouth daily with breakfast    Acne vulgaris  Stable, on OCP and topicals       -Patient is pursuing [facility]estyle Intervention Program, has 2 more classes and 1 more RD visit, plans to switch to bundles when done    -Initial weight loss goal of 5-10% weight loss for improved health  -Screening labs, reviewed, last labs noted increased insulin, fasting  Initial: 231lbs  Current: 237lbs  Change: +6lbs  Goal: STG - <200, LTG  175#    Goals:  Food log (ie ) www myfitnesspal com,sparkpeople  com,loseit com,calorieking  com,etc  baritastic  No sugary beverages  Increase physical activity by 10 minutes daily  Gradually increase physical activity to a goal of 5 days per week for 30 minutes of MODERATE intensity PLUS 2 days per week of FULL BODY resistance training, she plans to start wt training and add to her 30min running  Continue intermittent fasting (600-800 calories 2 times a week)  Calories:  2469-4660 calories, may stay 600-800 on fasting days  Protein:75 grams  Fluid: At least 64oz of water daily  Follow up in approximately 6-8 weeks with Non-Surgical Physician/Advanced Practitioner  Subjective:   Chief Complaint   Patient presents with    Follow-up     [facility] follow up        Patient ID: Layo Jain  is a 20 y o  female with excess weight/obesity here to pursue weight management  Patient is pursuing [facility]vention Program      HPI   Pt is going throught Healthy [facility], and plans to transition into Bundles afterward     She is complaint, she mainly struggles with food logging, too little calories despite exercising  Started recently intermittent fasting  Intermittent fasting 2 times a week 12pm to 8 pm (600 to 800 calories), tolerating  Denies cravings/hunger or decreased exercise  Exercise: 2-3 times a week, running 30min on treadmill  Not on fasting days  She tried phentermine previously but was forgetting to eat and drink water so she got dizzy and dehydrated  Lost 4lbs  In 3 months  Noted last fasting insulin elevated at 18  Hx of acne - now controlled on OCPs and topicals  The following portions of the patient's history were reviewed and updated as appropriate: allergies, current medications, past family history, past medical history, past social history, past surgical history and problem list     Review of Systems   Constitutional: Negative for activity change and appetite change  HENT: Negative  Eyes: Negative  Respiratory: Negative  Cardiovascular: Negative  Gastrointestinal: Negative  Endocrine: Negative  Genitourinary: Negative  Musculoskeletal: Negative for arthralgias  Skin: Negative for rash  Neurological: Negative  Psychiatric/Behavioral: Negative  Objective:    /80 (BP Location: Left arm, Patient Position: Sitting, Cuff Size: Adult)   Pulse 80   Temp 98 6 °F (37 °C) (Tympanic)   Resp 16   Ht 5' 3" (1 6 m)   Wt 108 kg (237 lb 9 6 oz)   BMI 42 09 kg/m²      Physical Exam   Constitutional   General appearance: Abnormal   well developed and obese  Eyes No conjunctival pallor  Ears, Nose, Mouth, and Throat Oral mucosa moist    Pulmonary   Respiratory effort: No increased work of breathing or signs of respiratory distress  Auscultation of lungs: Clear to auscultation, equal breath sounds bilaterally, no wheezes, no rales, no rhonci  Cardiovascular   Auscultation of heart: Normal rate and rhythm, normal S1 and S2, without murmurs      Examination of extremities for edema and/or varicosities: Normal   no edema  Abdomen   Abdomen: Abnormal   The abdomen was obese  Bowel sounds were normal  The abdomen was soft and nontender     Musculoskeletal   Gait and station: Normal     Psychiatric   Orientation to person, place and time: Normal     Affect: appropriate

## 2018-12-19 ENCOUNTER — OFFICE VISIT (OUTPATIENT)
Dept: BARIATRICS | Facility: CLINIC | Age: 23
End: 2018-12-19
Payer: COMMERCIAL

## 2018-12-19 VITALS
SYSTOLIC BLOOD PRESSURE: 120 MMHG | TEMPERATURE: 98.6 F | HEART RATE: 80 BPM | DIASTOLIC BLOOD PRESSURE: 80 MMHG | WEIGHT: 237.6 LBS | RESPIRATION RATE: 16 BRPM | BODY MASS INDEX: 42.1 KG/M2 | HEIGHT: 63 IN

## 2018-12-19 DIAGNOSIS — E66.01 OBESITY, CLASS III, BMI 40-49.9 (MORBID OBESITY) (HCC): Primary | ICD-10-CM

## 2018-12-19 DIAGNOSIS — E16.1 HYPERINSULINEMIA: ICD-10-CM

## 2018-12-19 DIAGNOSIS — L70.0 ACNE VULGARIS: ICD-10-CM

## 2018-12-19 PROCEDURE — 99214 OFFICE O/P EST MOD 30 MIN: CPT | Performed by: FAMILY MEDICINE

## 2018-12-19 RX ORDER — METFORMIN HYDROCHLORIDE 500 MG/1
500 TABLET, EXTENDED RELEASE ORAL
Qty: 30 TABLET | Refills: 1 | Status: SHIPPED | OUTPATIENT
Start: 2018-12-19 | End: 2019-01-21 | Stop reason: SDUPTHER

## 2018-12-19 RX ORDER — TRIAMCINOLONE ACETONIDE 1 MG/G
CREAM TOPICAL
COMMUNITY
Start: 2018-12-17 | End: 2019-01-31

## 2018-12-20 ENCOUNTER — CLINICAL SUPPORT (OUTPATIENT)
Dept: BARIATRICS | Facility: CLINIC | Age: 23
End: 2018-12-20

## 2018-12-20 VITALS — HEIGHT: 63 IN | WEIGHT: 239.2 LBS | BODY MASS INDEX: 42.38 KG/M2

## 2018-12-20 DIAGNOSIS — R63.5 ABNORMAL WEIGHT GAIN: Primary | ICD-10-CM

## 2018-12-20 PROCEDURE — RECHECK

## 2018-12-28 ENCOUNTER — OFFICE VISIT (OUTPATIENT)
Dept: FAMILY MEDICINE CLINIC | Facility: CLINIC | Age: 23
End: 2018-12-28
Payer: COMMERCIAL

## 2018-12-28 VITALS
BODY MASS INDEX: 42.49 KG/M2 | SYSTOLIC BLOOD PRESSURE: 116 MMHG | WEIGHT: 239.8 LBS | HEIGHT: 63 IN | TEMPERATURE: 98.8 F | HEART RATE: 101 BPM | RESPIRATION RATE: 16 BRPM | OXYGEN SATURATION: 98 % | DIASTOLIC BLOOD PRESSURE: 78 MMHG

## 2018-12-28 DIAGNOSIS — J01.01 ACUTE RECURRENT MAXILLARY SINUSITIS: Primary | ICD-10-CM

## 2018-12-28 PROCEDURE — 99213 OFFICE O/P EST LOW 20 MIN: CPT | Performed by: FAMILY MEDICINE

## 2018-12-28 RX ORDER — AMOXICILLIN AND CLAVULANATE POTASSIUM 875; 125 MG/1; MG/1
1 TABLET, FILM COATED ORAL 2 TIMES DAILY WITH MEALS
Qty: 20 TABLET | Refills: 0 | Status: SHIPPED | OUTPATIENT
Start: 2018-12-28 | End: 2019-01-07

## 2018-12-28 NOTE — PROGRESS NOTES
Assessment/Plan:    No problem-specific Assessment & Plan notes found for this encounter  Diagnoses and all orders for this visit:    Acute recurrent maxillary sinusitis  Comments:  Pt is stable on exam  Treating with Augmentin, warm salt water gargles, OTC Cough/Cold Preps PRN, rest, & good PO hydration  Orders:  -     amoxicillin-clavulanate (AUGMENTIN) 875-125 mg per tablet; Take 1 tablet by mouth 2 (two) times a day with meals for 10 days          Subjective:      Patient ID: Nakul Acosta is a 20 y o  female  Sick x 3 - 4 days  Pt is not pregnant at this time  ,          The following portions of the patient's history were reviewed and updated as appropriate: allergies, current medications, past family history, past social history, past surgical history and problem list     Past Medical History:   Diagnosis Date    Acne     Asthma     exercise induced asthma    External hemorrhoids     last assessed 8/15/12; resolved 6/13/14    Fatigue     last assessed 4/8/13; resolved 6/13/14    Obesity        Current Outpatient Prescriptions:     amoxicillin-clavulanate (AUGMENTIN) 875-125 mg per tablet, Take 1 tablet by mouth 2 (two) times a day with meals for 10 days, Disp: 20 tablet, Rfl: 0    clindamycin (CLEOCIN T) 1 % lotion, Apply 1 application topically every morning, Disp: , Rfl: 3    doxycycline (PERIOSTAT) 20 MG tablet, Take 1 tablet by mouth daily, Disp: , Rfl:     hydrocortisone 2 5 % ointment, APPLY TWICE A DAY TO EYELIDS FOR UP TO ONE WEEK, USE ON AFFECTED AREAS ON HANDS/LEGS X 2 WEEKS, Disp: , Rfl: 1    LARISSIA 0 1-20 MG-MCG per tablet, TAKE 1 TABLET DAILY, Disp: 84 tablet, Rfl: 3    metFORMIN (GLUCOPHAGE-XR) 500 mg 24 hr tablet, Take 1 tablet (500 mg total) by mouth daily with breakfast, Disp: 30 tablet, Rfl: 1    tretinoin (RETIN-A) 0 025 % cream, APPLY A PEA SIZED AMOUNT TO ENTIRE FACE EVERY NIGHT AT BEDTIME, Disp: , Rfl: 1    tretinoin (RETIN-A) 0 1 % cream, Apply 1 application topically daily, Disp: , Rfl:     triamcinolone (KENALOG) 0 1 % cream, , Disp: , Rfl:     triamcinolone (KENALOG) 0 1 % ointment, 1 APPLICATION APPLY ON THE SKIN TWICE A DAY APPLY TO AFFECTED AREA TWICE A DAY, Disp: , Rfl: 0    No Known Allergies    Review of Systems   Constitutional: Negative for activity change and fever  HENT: Positive for congestion, postnasal drip, sinus pain and sore throat  Respiratory: Negative for cough  Objective:      /78 (BP Location: Left arm, Patient Position: Sitting, Cuff Size: Large)   Pulse 101   Temp 98 8 °F (37 1 °C) (Tympanic)   Resp 16   Ht 5' 3" (1 6 m)   Wt 109 kg (239 lb 12 8 oz)   SpO2 98%   BMI 42 48 kg/m²          Physical Exam   Constitutional: She is oriented to person, place, and time  She appears well-developed and well-nourished  No distress  HENT:   Head: Normocephalic and atraumatic  Right Ear: Hearing, tympanic membrane, external ear and ear canal normal    Left Ear: Hearing, tympanic membrane, external ear and ear canal normal    Nose: Mucosal edema present  Right sinus exhibits maxillary sinus tenderness  Left sinus exhibits maxillary sinus tenderness  Mouth/Throat: Oropharynx is clear and moist  No oropharyngeal exudate  Eyes: Conjunctivae are normal    Neck: Normal range of motion  Neck supple  No thyromegaly present  Cardiovascular: Normal rate, regular rhythm and normal heart sounds  Exam reveals no gallop and no friction rub  No murmur heard  Pulmonary/Chest: Effort normal and breath sounds normal  No respiratory distress  She has no wheezes  She has no rales  Lymphadenopathy:     She has no cervical adenopathy  Neurological: She is alert and oriented to person, place, and time  Skin: She is not diaphoretic  Psychiatric: She has a normal mood and affect  Her behavior is normal  Judgment and thought content normal    Nursing note and vitals reviewed

## 2019-01-10 ENCOUNTER — TELEPHONE (OUTPATIENT)
Dept: FAMILY MEDICINE CLINIC | Facility: CLINIC | Age: 24
End: 2019-01-10

## 2019-01-10 NOTE — TELEPHONE ENCOUNTER
PHILOMENA CALLED OUT OF WORK TODAY DUE TO HAVING  GI ISSUES AND UP ALL NIGHT  PHILOMENA  KNOWS THIS IS JUST DIARRHEA AND STARTING TO CLEAR UP TODAY  PATIENT  WOULD LIKE TO HAVE A RETURN TO WORK NOTE FOR TOMORROW  PATIENT HAD A MILKSHAKE YESTERDAY AND DID NOT SIT WELL WITH HER        PLEASE ADVISE

## 2019-01-21 ENCOUNTER — TELEPHONE (OUTPATIENT)
Dept: BARIATRICS | Facility: CLINIC | Age: 24
End: 2019-01-21

## 2019-01-21 DIAGNOSIS — E66.01 OBESITY, CLASS III, BMI 40-49.9 (MORBID OBESITY) (HCC): Primary | ICD-10-CM

## 2019-01-21 DIAGNOSIS — E16.1 HYPERINSULINEMIA: ICD-10-CM

## 2019-01-21 RX ORDER — METFORMIN HYDROCHLORIDE 750 MG/1
750 TABLET, EXTENDED RELEASE ORAL
Qty: 30 TABLET | Refills: 1 | Status: ON HOLD | OUTPATIENT
Start: 2019-01-21 | End: 2019-08-13 | Stop reason: ALTCHOICE

## 2019-01-21 NOTE — TELEPHONE ENCOUNTER
Ok,med refilled to higher dose 750mg qd, sent to pharmacy on file  Pt to call us if any issues or SE with new dose    (Routing comment)

## 2019-01-21 NOTE — TELEPHONE ENCOUNTER
Patient called asking for a refill of medication  Metformin 750mg 1 po qd  Patient states she was on metformin 500mg and stated she was in to increase to 750mg and let us know how she is doing after a month   Patient states she is doing well no issues or side effects

## 2019-01-23 DIAGNOSIS — L70.0 ACNE VULGARIS: ICD-10-CM

## 2019-01-23 RX ORDER — DOXYCYCLINE 50 MG/1
50 CAPSULE ORAL 2 TIMES DAILY
Qty: 60 CAPSULE | Refills: 1 | Status: SHIPPED | OUTPATIENT
Start: 2019-01-23 | End: 2019-01-31

## 2019-01-31 ENCOUNTER — PROCEDURE VISIT (OUTPATIENT)
Dept: OBGYN CLINIC | Facility: CLINIC | Age: 24
End: 2019-01-31
Payer: COMMERCIAL

## 2019-01-31 ENCOUNTER — OFFICE VISIT (OUTPATIENT)
Dept: BARIATRICS | Facility: CLINIC | Age: 24
End: 2019-01-31
Payer: COMMERCIAL

## 2019-01-31 VITALS
SYSTOLIC BLOOD PRESSURE: 120 MMHG | TEMPERATURE: 98.7 F | RESPIRATION RATE: 17 BRPM | WEIGHT: 242.5 LBS | HEART RATE: 89 BPM | DIASTOLIC BLOOD PRESSURE: 80 MMHG | HEIGHT: 63 IN | BODY MASS INDEX: 42.97 KG/M2

## 2019-01-31 VITALS
BODY MASS INDEX: 43.23 KG/M2 | WEIGHT: 244 LBS | DIASTOLIC BLOOD PRESSURE: 78 MMHG | HEIGHT: 63 IN | SYSTOLIC BLOOD PRESSURE: 118 MMHG

## 2019-01-31 DIAGNOSIS — E16.1 HYPERINSULINEMIA: ICD-10-CM

## 2019-01-31 DIAGNOSIS — N87.1 MODERATE DYSPLASIA OF CERVIX (CIN II): Primary | ICD-10-CM

## 2019-01-31 DIAGNOSIS — E66.01 OBESITY, CLASS III, BMI 40-49.9 (MORBID OBESITY) (HCC): Primary | ICD-10-CM

## 2019-01-31 DIAGNOSIS — G89.29 CHRONIC THORACIC BACK PAIN, UNSPECIFIED BACK PAIN LATERALITY: ICD-10-CM

## 2019-01-31 DIAGNOSIS — M54.6 CHRONIC THORACIC BACK PAIN, UNSPECIFIED BACK PAIN LATERALITY: ICD-10-CM

## 2019-01-31 PROCEDURE — 99213 OFFICE O/P EST LOW 20 MIN: CPT | Performed by: OBSTETRICS & GYNECOLOGY

## 2019-01-31 PROCEDURE — 99214 OFFICE O/P EST MOD 30 MIN: CPT | Performed by: FAMILY MEDICINE

## 2019-01-31 NOTE — PROGRESS NOTES
Assessment/Plan: Moderate dysplasia of the cervix on colposcopically directed biopsies, for repeat Pap smear  Repeat Pap smear done without issue  Will call patient with results of the Pap smear and follow up appropriately       Problem List Items Addressed This Visit     Moderate dysplasia of cervix (SALTY II) - Primary    Relevant Orders    GP Liquid-Based Pap + HPV Plus            Subjective:      Patient ID: Reagan Castillo is a 20 y o  female  David Strong is here today for continued cervical surveillance  She had had approximately a year ago in July a Pap smear which came back atypical squamous cells of undetermined significance suspect high-grade lesion  She underwent colposcopy which then revealed SALTY 2  After being reviewed by Dr Armin Rosas she felt that either follow-up colposcopy and/or Pap smear should be done  We discussed with the patient whether not she wanted to proceed with a colposcopy and/or Pap smear, I think the Pap smear is sufficient at this time  We spent some time discussing as well that if this Pap smear comes back high-grade as the other 1 does it may be worthwhile to proceed with an ablation procedure, LEEP, and/or follow up with gyn on thick  She is nulliparous and is indeed hoping for the children in the future  We did discuss how the ablation procedure may impact of pregnancy with some cervical surveillance  The following portions of the patient's history were reviewed and updated as appropriate:   She  has a past medical history of Acne; Asthma; External hemorrhoids; Fatigue; and Obesity    She   Patient Active Problem List    Diagnosis Date Noted    Moderate dysplasia of cervix (SALTY II) 01/31/2019    Hyperinsulinemia 12/19/2018    Acne vulgaris 04/17/2018    Chronic thoracic back pain 11/09/2017    Hemorrhoids, external 04/21/2017    Obesity, Class III, BMI 40-49 9 (morbid obesity) ([facility] [address] ) 12/20/2016     She  has a past surgical history that includes [place] tooth extraction and pr reduction of large breast (Bilateral, 2/22/2018)  Her family history includes Breast cancer in her maternal aunt; Cancer in her maternal aunt; Diabetes in her father; No Known Problems in her mother  She  reports that she has never smoked  She has never used smokeless tobacco  She reports that she drinks alcohol  She reports that she does not use drugs  Current Outpatient Prescriptions   Medication Sig Dispense Refill    LARISSIA 0 1-20 MG-MCG per tablet TAKE 1 TABLET DAILY 84 tablet 3    metFORMIN (GLUCOPHAGE-XR) 750 mg 24 hr tablet Take 1 tablet (750 mg total) by mouth daily with breakfast 30 tablet 1    clindamycin (CLEOCIN T) 1 % lotion Apply 1 application topically every morning  3    doxycycline (PERIOSTAT) 20 MG tablet Take 1 tablet by mouth daily      doxycycline monohydrate (MONODOX) 50 mg capsule Take 1 capsule (50 mg total) by mouth 2 (two) times a day for 30 days 60 capsule 1    hydrocortisone 2 5 % ointment APPLY TWICE A DAY TO EYELIDS FOR UP TO ONE WEEK, USE ON AFFECTED AREAS ON HANDS/LEGS X 2 WEEKS  1    tretinoin (RETIN-A) 0 025 % cream APPLY A PEA SIZED AMOUNT TO ENTIRE FACE EVERY NIGHT AT BEDTIME  1    tretinoin (RETIN-A) 0 1 % cream Apply 1 application topically daily      triamcinolone (KENALOG) 0 1 % cream       triamcinolone (KENALOG) 0 1 % ointment 1 APPLICATION APPLY ON THE SKIN TWICE A DAY APPLY TO AFFECTED AREA TWICE A DAY  0     No current facility-administered medications for this visit        Current Outpatient Prescriptions on File Prior to Visit   Medication Sig    LARISSIA 0 1-20 MG-MCG per tablet TAKE 1 TABLET DAILY    metFORMIN (GLUCOPHAGE-XR) 750 mg 24 hr tablet Take 1 tablet (750 mg total) by mouth daily with breakfast    clindamycin (CLEOCIN T) 1 % lotion Apply 1 application topically every morning    doxycycline (PERIOSTAT) 20 MG tablet Take 1 tablet by mouth daily    doxycycline monohydrate (MONODOX) 50 mg capsule Take 1 capsule (50 mg total) by mouth 2 (two) times a day for 30 days    hydrocortisone 2 5 % ointment APPLY TWICE A DAY TO EYELIDS FOR UP TO ONE WEEK, USE ON AFFECTED AREAS ON HANDS/LEGS X 2 WEEKS    tretinoin (RETIN-A) 0 025 % cream APPLY A PEA SIZED AMOUNT TO ENTIRE FACE EVERY NIGHT AT BEDTIME    tretinoin (RETIN-A) 0 1 % cream Apply 1 application topically daily    triamcinolone (KENALOG) 0 1 % cream     triamcinolone (KENALOG) 0 1 % ointment 1 APPLICATION APPLY ON THE SKIN TWICE A DAY APPLY TO AFFECTED AREA TWICE A DAY     No current facility-administered medications on file prior to visit  She has No Known Allergies       Review of Systems   Constitutional: Negative for chills, fatigue, fever and unexpected weight change  HENT: Negative for dental problem, sinus pain and sinus pressure  Eyes: Negative for visual disturbance  Respiratory: Negative for cough, shortness of breath and wheezing  Cardiovascular: Negative for chest pain and leg swelling  Gastrointestinal: Negative for constipation, diarrhea, nausea and vomiting  Genitourinary: Negative for menstrual problem, pelvic pain and urgency  Musculoskeletal: Negative for back pain  Allergic/Immunologic: Negative for environmental allergies  Neurological: Negative for dizziness and headaches  All other systems reviewed and are negative  Objective:      LMP 01/10/2019 (Exact Date)          Physical Exam   Constitutional: She is oriented to person, place, and time  She appears well-developed  No distress  Young obese white female   Genitourinary:   Genitourinary Comments: Normal female, no vulvar or vaginal lesions the cervix is grossly normal appearance is small and nulliparous  Pap smear is taken without incident there is no friability and there is no visible lesions  Neurological: She is alert and oriented to person, place, and time  Psychiatric: She has a normal mood and affect

## 2019-01-31 NOTE — PROGRESS NOTES
Assessment/Plan:    Acne vulgaris  Stable, on OCP, abx and topicals  Hyperinsulinemia  on metformin  Tolerating well  Obesity, Class III, BMI 40-49 9 (morbid obesity) ([address] )  As below      Diagnoses and all orders for this visit:    Obesity, Class III, BMI 40-49 9 (morbid obesity) ([facility])    Hyperinsulinemia    Chronic thoracic back pain, unspecified back pain laterality        -Patient is pursuing conservative after finishing  [facility] Lifestyle Intervention Program, and RD bundles    -Initial weight loss goal of 5-10% weight loss for improved health  -Screening labs, reviewed, last labs noted increased insulin, fasting (labs done 11/2018)   - encouraged food logging   -cont with higher dose of metformin, tolerating well  - Discussed with patient option of surgical weight loss with current BMI 42 she could meet criteria  This is something she is contemplating as she has been unsuccessful on her weight loss goals  Initial: 234lbs  Current: 242 5lbslbs  Change: +8 5lbs  Goal: STG - <200, LTG  175#     Goals:  Food log (ie ) www myfitnesspal com,sparkpeople  com,loseit com,calorieking  com,etc  baritastic  No sugary beverages  At least 64oz of water daily  Increase physical activity by 10 minutes daily  Gradually increase physical activity to a goal of 5 days per week for 30 minutes of MODERATE intensity PLUS 2 days per week of FULL BODY resistance training  Goal protein intake of 60-80 grams per day, 5-10 servings of fruits and vegetables per day, 25-35 grams of dietary fiber per day, 8215-6707 calories per day (IF days 600-800) and Limit caffeine to 16oz per day      Follow up in approximately 3 months with Non-Surgical Physician/Advanced Practitioner  Subjective:   Chief Complaint   Patient presents with    Follow-up     Pt is in the office for mwm follow up  Patient ID: Franki Huston  is a 20 y o  female with excess weight/obesity here to pursue weight management  Patient is pursuing Conservative Program  After completing healthy core and RD bundle  HPI     Pt presents for MWM 1 month follow up  Last visit she was started on metformin ER 500mg qd and last week after tolerating it well was increased to 750mg qd  Has gained 3 3 lbs since last visit on 12/20 but she is currently on her menses and feels bloated  Struggles on weekends with eating outside  Has an upcoming trip for [employer] competition this weekend  Finds food logging too time consuming during the weekdays bc some days are 13hr long between work and practice  She is a  + full time job  IF 2 times a week 800 calories after 12pm  (breakfast sandwich, or 2 hard boiled eggs, dinner -chicken and protein blend of lentil and quinoa)  Non fasting days  BF- hard boil eggs  S: yogurt  L: chicken and veggies  S: apple or cheese stick or crackers  D: chicken and veggetables    Exercise: 2 times a week 40 mins on treadmill and weight training  She is questioning lately if surgery would be a good option for her to reach her weight loss goals  The following portions of the patient's history were reviewed and updated as appropriate: allergies, current medications, past family history, past medical history, past social history, past surgical history and problem list     Review of Systems   Constitutional: Negative for activity change and appetite change  HENT: Negative  Respiratory: Negative  Cardiovascular: Negative  Gastrointestinal: Negative  Genitourinary: Negative  Musculoskeletal: Negative for arthralgias  Skin: Negative for rash  Psychiatric/Behavioral: Negative          Objective:    /80 (BP Location: Left arm, Patient Position: Sitting, Cuff Size: Large)   Pulse 89   Temp 98 7 °F (37 1 °C) (Tympanic)   Resp 17   Ht 5' 3" (1 6 m)   Wt 110 kg (242 lb 8 oz)   LMP 01/10/2019 (Exact Date)   BMI 42 96 kg/m²      Physical Exam   Constitutional General appearance: Abnormal   well developed and obese  Eyes No conjunctival pallor  Ears, Nose, Mouth, and Throat Oral mucosa moist    Pulmonary   Respiratory effort: No increased work of breathing or signs of respiratory distress  Auscultation of lungs: Clear to auscultation, equal breath sounds bilaterally, no wheezes, no rales, no rhonci  Cardiovascular   Auscultation of heart: Normal rate and rhythm, normal S1 and S2, without murmurs  Examination of extremities for edema and/or varicosities: Normal   no edema  Abdomen   Abdomen: Abnormal   The abdomen was obese  Bowel sounds were normal  The abdomen was soft and nontender     Musculoskeletal   Gait and station: Normal     Psychiatric   Orientation to person, place and time: Normal     Affect: appropriate

## 2019-02-01 ENCOUNTER — TELEPHONE (OUTPATIENT)
Dept: BARIATRICS | Facility: CLINIC | Age: 24
End: 2019-02-01

## 2019-02-06 LAB
HPV HR 12 DNA CVX QL NAA+PROBE: DETECTED
HPV16 DNA SPEC QL NAA+PROBE: NOT DETECTED
HPV18 DNA SPEC QL NAA+PROBE: NOT DETECTED
THIN PREP CVX: ABNORMAL

## 2019-02-07 ENCOUNTER — TELEPHONE (OUTPATIENT)
Dept: OBGYN CLINIC | Facility: CLINIC | Age: 24
End: 2019-02-07

## 2019-03-11 ENCOUNTER — OFFICE VISIT (OUTPATIENT)
Dept: OBGYN CLINIC | Facility: CLINIC | Age: 24
End: 2019-03-11
Payer: COMMERCIAL

## 2019-03-11 VITALS
HEIGHT: 63 IN | WEIGHT: 247 LBS | DIASTOLIC BLOOD PRESSURE: 60 MMHG | BODY MASS INDEX: 43.77 KG/M2 | SYSTOLIC BLOOD PRESSURE: 100 MMHG

## 2019-03-11 DIAGNOSIS — N87.1 MODERATE DYSPLASIA OF CERVIX (CIN II): Primary | ICD-10-CM

## 2019-03-11 PROCEDURE — 99213 OFFICE O/P EST LOW 20 MIN: CPT | Performed by: OBSTETRICS & GYNECOLOGY

## 2019-03-11 RX ORDER — CLINDAMYCIN AND BENZOYL PEROXIDE 10; 50 MG/G; MG/G
GEL TOPICAL
Refills: 1 | COMMUNITY
Start: 2019-02-21 | End: 2020-12-03 | Stop reason: SDUPTHER

## 2019-03-11 NOTE — PROGRESS NOTES
Subjective     Jerzy Suárez is a 20 y o   female here for follow-up visit  Patient has history of 2018 ascus cannot rule out high-grade  Pap followed by 2018 colposcopy showing SALTY 2 and normal ECC  Based on [place] [facility] CP guidelines patient opted for conservative management had repeat Pap smear 2019 which continued to show HGSIL  Reviewed in counseled on options for conservative management would refer to gynecology Oncology to continue conservative management verses cone biopsy  Patient also had questions about PCO and was thoroughly counseled regarding PCO  Personal health questionnaire reviewed: yes  Gynecologic History  No LMP recorded  (Menstrual status: Birth Control)  Contraception: OCP (estrogen/progesterone)  Last Pap:  2019   Results were:  High-grade CANDE      OB History        0    Para   0    Term   0       0    AB   0    Living   0       SAB   0    TAB   0    Ectopic   0    Multiple   0    Live Births   0                 Past Medical History:   Diagnosis Date    Acne     Asthma     exercise induced asthma    External hemorrhoids     last assessed 8/15/12; resolved 14    Fatigue     last assessed 13; resolved 14    Obesity      Past Surgical History:   Procedure Laterality Date    ID REDUCTION OF LARGE BREAST Bilateral 2018    Procedure: BREAST REDUCTION;  Surgeon: Rudolph Raman MD;  [facility]: [facility];  Service: Plastics    WISDOM TOOTH EXTRACTION         Current Outpatient Medications:     clindamycin-benzoyl peroxide (BENZACLIN) gel, APPLY TO FACE ONCE TO TWICE DAILY AS NEEDED FOR SPOT TX FOR FLARES, Disp: , Rfl: 1    LARISSIA 0 1-20 MG-MCG per tablet, TAKE 1 TABLET DAILY, Disp: 84 tablet, Rfl: 3    metFORMIN (GLUCOPHAGE-XR) 750 mg 24 hr tablet, Take 1 tablet (750 mg total) by mouth daily with breakfast, Disp: 30 tablet, Rfl: 1    tretinoin (RETIN-A) 0 025 % cream, APPLY A PEA SIZED AMOUNT TO ENTIRE FACE EVERY NIGHT AT BEDTIME, Disp: , Rfl: 1    triamcinolone (KENALOG) 0 1 % ointment, 1 APPLICATION APPLY ON THE SKIN TWICE A DAY APPLY TO AFFECTED AREA TWICE A DAY, Disp: , Rfl: 0  No Known Allergies    Social History     Tobacco Use    Smoking status: Never Smoker    Smokeless tobacco: Never Used   Substance Use Topics    Alcohol use: Yes     Comment: socially - 1-2/week    Drug use: No         Review of Systems  Review of Systems   Constitutional: Negative for fatigue, fever and unexpected weight change  HENT: Negative for dental problem, sinus pressure and sinus pain  Eyes: Negative for visual disturbance  Respiratory: Negative for cough, shortness of breath and wheezing  Cardiovascular: Negative for chest pain and leg swelling  Gastrointestinal: Negative for blood in stool, constipation, diarrhea, nausea and vomiting  Endocrine: Negative for cold intolerance, heat intolerance and polydipsia  Genitourinary: Negative for dysuria, frequency, hematuria, menstrual problem and pelvic pain  Musculoskeletal: Negative for arthralgias and back pain  Neurological: Negative for dizziness, seizures and headaches  Psychiatric/Behavioral: The patient is not nervous/anxious  Objective     /60   Ht 5' 3" (1 6 m)   Wt 112 kg (247 lb)   BMI 43 75 kg/m²   General appearance: alert and oriented, in no acute distress      Assessment  49-year-old  with SALTY 2 of the cervix and persistent high-grade CANDE Pap  Patient desires conservative management if able  Plan  Referral to Gynecologic Oncology  Return in September for annual and follow-up of OCP

## 2019-04-18 ENCOUNTER — CONSULT (OUTPATIENT)
Dept: GYNECOLOGIC ONCOLOGY | Facility: CLINIC | Age: 24
End: 2019-04-18
Payer: COMMERCIAL

## 2019-04-18 VITALS
DIASTOLIC BLOOD PRESSURE: 76 MMHG | HEIGHT: 63 IN | TEMPERATURE: 98.5 F | HEART RATE: 68 BPM | WEIGHT: 243 LBS | BODY MASS INDEX: 43.05 KG/M2 | SYSTOLIC BLOOD PRESSURE: 110 MMHG

## 2019-04-18 DIAGNOSIS — N87.1 MODERATE DYSPLASIA OF CERVIX (CIN II): Primary | ICD-10-CM

## 2019-04-18 PROCEDURE — 57455 BIOPSY OF CERVIX W/SCOPE: CPT | Performed by: OBSTETRICS & GYNECOLOGY

## 2019-04-18 PROCEDURE — 99203 OFFICE O/P NEW LOW 30 MIN: CPT | Performed by: OBSTETRICS & GYNECOLOGY

## 2019-04-19 ENCOUNTER — TELEPHONE (OUTPATIENT)
Dept: FAMILY MEDICINE CLINIC | Facility: CLINIC | Age: 24
End: 2019-04-19

## 2019-04-19 PROCEDURE — 88305 TISSUE EXAM BY PATHOLOGIST: CPT | Performed by: PATHOLOGY

## 2019-04-19 PROCEDURE — 88344 IMHCHEM/IMCYTCHM EA MLT ANTB: CPT | Performed by: PATHOLOGY

## 2019-04-25 ENCOUNTER — OFFICE VISIT (OUTPATIENT)
Dept: FAMILY MEDICINE CLINIC | Facility: CLINIC | Age: 24
End: 2019-04-25
Payer: COMMERCIAL

## 2019-04-25 VITALS
WEIGHT: 244.6 LBS | OXYGEN SATURATION: 98 % | DIASTOLIC BLOOD PRESSURE: 70 MMHG | SYSTOLIC BLOOD PRESSURE: 100 MMHG | BODY MASS INDEX: 39.31 KG/M2 | TEMPERATURE: 99.6 F | HEIGHT: 66 IN | RESPIRATION RATE: 14 BRPM | HEART RATE: 64 BPM

## 2019-04-25 DIAGNOSIS — Z00.00 ANNUAL PHYSICAL EXAM: Primary | ICD-10-CM

## 2019-04-25 PROCEDURE — 99395 PREV VISIT EST AGE 18-39: CPT | Performed by: FAMILY MEDICINE

## 2019-05-13 ENCOUNTER — CLINICAL SUPPORT (OUTPATIENT)
Dept: BARIATRICS | Facility: CLINIC | Age: 24
End: 2019-05-13

## 2019-05-13 VITALS
DIASTOLIC BLOOD PRESSURE: 84 MMHG | HEIGHT: 64 IN | HEART RATE: 84 BPM | WEIGHT: 244 LBS | TEMPERATURE: 97.7 F | RESPIRATION RATE: 16 BRPM | BODY MASS INDEX: 41.66 KG/M2 | SYSTOLIC BLOOD PRESSURE: 128 MMHG

## 2019-05-13 DIAGNOSIS — E66.01 MORBID OBESITY (HCC): Primary | ICD-10-CM

## 2019-05-13 DIAGNOSIS — E66.01 OBESITY, CLASS III, BMI 40-49.9 (MORBID OBESITY) (HCC): Primary | ICD-10-CM

## 2019-05-13 PROCEDURE — RECHECK

## 2019-05-15 ENCOUNTER — TELEPHONE (OUTPATIENT)
Dept: FAMILY MEDICINE CLINIC | Facility: CLINIC | Age: 24
End: 2019-05-15

## 2019-05-15 ENCOUNTER — CLINICAL SUPPORT (OUTPATIENT)
Dept: FAMILY MEDICINE CLINIC | Facility: CLINIC | Age: 24
End: 2019-05-15
Payer: COMMERCIAL

## 2019-05-15 DIAGNOSIS — Z11.1 PPD SCREENING TEST: Primary | ICD-10-CM

## 2019-05-15 PROCEDURE — 86580 TB INTRADERMAL TEST: CPT

## 2019-05-17 ENCOUNTER — CLINICAL SUPPORT (OUTPATIENT)
Dept: FAMILY MEDICINE CLINIC | Facility: CLINIC | Age: 24
End: 2019-05-17

## 2019-05-17 DIAGNOSIS — Z11.1 SCREENING FOR TUBERCULOSIS: Primary | ICD-10-CM

## 2019-05-17 LAB
INDURATION: 0 MM
TB SKIN TEST: NEGATIVE

## 2019-05-17 PROCEDURE — RECHECK

## 2019-05-22 ENCOUNTER — CLINICAL SUPPORT (OUTPATIENT)
Dept: FAMILY MEDICINE CLINIC | Facility: CLINIC | Age: 24
End: 2019-05-22
Payer: COMMERCIAL

## 2019-05-22 DIAGNOSIS — Z11.1 SCREENING FOR TUBERCULOSIS: Primary | ICD-10-CM

## 2019-05-22 PROCEDURE — 86580 TB INTRADERMAL TEST: CPT

## 2019-05-24 LAB
INDURATION: 0 MM
TB SKIN TEST: NEGATIVE

## 2019-05-30 ENCOUNTER — OFFICE VISIT (OUTPATIENT)
Dept: BARIATRICS | Facility: CLINIC | Age: 24
End: 2019-05-30
Payer: COMMERCIAL

## 2019-05-30 VITALS
HEART RATE: 86 BPM | HEIGHT: 63 IN | BODY MASS INDEX: 43.32 KG/M2 | WEIGHT: 244.5 LBS | DIASTOLIC BLOOD PRESSURE: 70 MMHG | TEMPERATURE: 98.5 F | RESPIRATION RATE: 18 BRPM | SYSTOLIC BLOOD PRESSURE: 118 MMHG

## 2019-05-30 DIAGNOSIS — E66.01 MORBID OBESITY (HCC): Primary | ICD-10-CM

## 2019-05-30 PROCEDURE — 99203 OFFICE O/P NEW LOW 30 MIN: CPT | Performed by: SURGERY

## 2019-07-02 ENCOUNTER — ANESTHESIA EVENT (OUTPATIENT)
Dept: GASTROENTEROLOGY | Facility: HOSPITAL | Age: 24
End: 2019-07-02

## 2019-07-03 ENCOUNTER — HOSPITAL ENCOUNTER (OUTPATIENT)
Dept: GASTROENTEROLOGY | Facility: HOSPITAL | Age: 24
Setting detail: OUTPATIENT SURGERY
Discharge: HOME | End: 2019-07-03
Attending: SURGERY
Payer: COMMERCIAL

## 2019-07-03 ENCOUNTER — ANESTHESIA (OUTPATIENT)
Dept: GASTROENTEROLOGY | Facility: HOSPITAL | Age: 24
End: 2019-07-03

## 2019-07-03 VITALS
HEIGHT: 63 IN | SYSTOLIC BLOOD PRESSURE: 136 MMHG | TEMPERATURE: 99.1 F | BODY MASS INDEX: 41.46 KG/M2 | WEIGHT: 234 LBS | DIASTOLIC BLOOD PRESSURE: 82 MMHG | OXYGEN SATURATION: 99 % | RESPIRATION RATE: 16 BRPM | HEART RATE: 66 BPM

## 2019-07-03 DIAGNOSIS — E66.01 MORBID OBESITY (HCC): ICD-10-CM

## 2019-07-03 LAB
EXT PREGNANCY TEST URINE: NEGATIVE
EXT. CONTROL: NORMAL

## 2019-07-03 PROCEDURE — 88305 TISSUE EXAM BY PATHOLOGIST: CPT | Performed by: PATHOLOGY

## 2019-07-03 PROCEDURE — 81025 URINE PREGNANCY TEST: CPT | Performed by: ANESTHESIOLOGY

## 2019-07-03 PROCEDURE — 43239 EGD BIOPSY SINGLE/MULTIPLE: CPT | Performed by: SURGERY

## 2019-07-03 RX ORDER — SODIUM CHLORIDE 9 MG/ML
125 INJECTION, SOLUTION INTRAVENOUS CONTINUOUS
Status: DISCONTINUED | OUTPATIENT
Start: 2019-07-03 | End: 2019-07-07 | Stop reason: HOSPADM

## 2019-07-03 RX ORDER — LIDOCAINE HYDROCHLORIDE 10 MG/ML
INJECTION, SOLUTION INFILTRATION; PERINEURAL AS NEEDED
Status: DISCONTINUED | OUTPATIENT
Start: 2019-07-03 | End: 2019-07-03 | Stop reason: SURG

## 2019-07-03 RX ORDER — PROPOFOL 10 MG/ML
INJECTION, EMULSION INTRAVENOUS AS NEEDED
Status: DISCONTINUED | OUTPATIENT
Start: 2019-07-03 | End: 2019-07-03 | Stop reason: SURG

## 2019-07-03 RX ADMIN — PROPOFOL 150 MG: 10 INJECTION, EMULSION INTRAVENOUS at 11:35

## 2019-07-03 RX ADMIN — PROPOFOL 50 MG: 10 INJECTION, EMULSION INTRAVENOUS at 11:36

## 2019-07-03 RX ADMIN — SODIUM CHLORIDE 125 ML/HR: 0.9 INJECTION, SOLUTION INTRAVENOUS at 11:02

## 2019-07-03 RX ADMIN — LIDOCAINE HYDROCHLORIDE 50 MG: 10 INJECTION, SOLUTION INFILTRATION; PERINEURAL at 11:35

## 2019-07-03 NOTE — ANESTHESIA PREPROCEDURE EVALUATION
Review of Systems/Medical History  Patient summary reviewed  Chart reviewed  No history of anesthetic complications     Cardiovascular  Negative cardio ROS    Pulmonary  Asthma (exercise induced) ,        GI/Hepatic  Negative GI/hepatic ROS               Endo/Other  Diabetes well controlled type 2 Oral agent,   Obesity (43=BMI)  morbid obesity   GYN  Negative gynecology ROS          Hematology  Negative hematology ROS      Musculoskeletal  Back pain , thoracic pain,        Neurology  Negative neurology ROS      Psychology   Negative psychology ROS              Physical Exam    Airway    Mallampati score: II  TM Distance: >3 FB  Neck ROM: full     Dental   No notable dental hx     Cardiovascular  Comment: Negative ROS, Rhythm: regular, Rate: normal, Cardiovascular exam normal    Pulmonary  Pulmonary exam normal Breath sounds clear to auscultation,     Other Findings        Anesthesia Plan  ASA Score- 3     Anesthesia Type- IV sedation with anesthesia with ASA Monitors  Additional Monitors:   Airway Plan:         Plan Factors-Patient not instructed to abstain from smoking on day of procedure  Patient did not smoke on day of surgery  Induction- intravenous  Postoperative Plan-     Informed Consent- Anesthetic plan and risks discussed with patient  I personally reviewed this patient with the CRNA  Discussed and agreed on the Anesthesia Plan with the CRNA  Iglesia Adkins

## 2019-07-03 NOTE — H&P
This is a 20 y o  female with a history of morbid obesity and Body mass index is 41 45 kg/m²  Here for an EGD to evaluate the anatomy of the GI tract  Physical Exam    AAOx3  RRR  CTA B  Abdomen obese  Benign  A/P:    This is a 20 y o  female with a history of morbid obesity and Body mass index is 41 45 kg/m²       Will proceed with the EGD and biopsies        Shaggy Bowles MD  07/03/19  11:30 AM

## 2019-07-03 NOTE — DISCHARGE INSTRUCTIONS
Upper Endoscopy   WHAT YOU NEED TO KNOW:   An upper endoscopy is also called an upper gastrointestinal (GI) endoscopy, or an esophagogastroduodenoscopy (EGD)  You may feel bloated, gassy, or have some abdominal discomfort after your procedure  Your throat may be sore for 24 to 36 hours  You may burp or pass gas from air that is still inside your body  DISCHARGE INSTRUCTIONS:   Call 911 if:   · You have sudden chest pain or trouble breathing  Seek care immediately if:   · You feel dizzy or faint  · You have trouble swallowing  · You have severe throat pain  · Your bowel movements are very dark or black  · Your abdomen is hard and firm and you have severe pain  · You vomit blood  Contact your healthcare provider if:   · You feel full or bloated and cannot burp or pass gas  · You have not had a bowel movement for 3 days after your procedure  · You have neck pain  · You have a fever or chills  · You have nausea or are vomiting  · You have a rash or hives  · You have questions or concerns about your endoscopy  Relieve a sore throat:  Suck on throat lozenges or crushed ice  Gargle with a small amount of warm salt water  Mix 1 teaspoon of salt and 1 cup of warm water to make salt water  Relieve gas and discomfort from bloating:  Lie on your right side with a heating pad on your abdomen  Take short walks to help pass gas  Eat small meals until bloating is relieved  Rest after your procedure:  Do not drive or make important decisions until the day after your procedure  Return to your normal activity as directed  You can usually return to work the day after your procedure  Follow up with your healthcare provider as directed:  Write down your questions so you remember to ask them during your visits  © 2017 [employer]  Information is for End User's use only and may not be sold, redistributed or otherwise used for commercial purposes   All illustrations and images included in [employer] are the copyrighted property of [employer] , [employer]  or [employer]  The above information is an  only  It is not intended as medical advice for individual conditions or treatments  Talk to your doctor, nurse or pharmacist before following any medical regimen to see if it is safe and effective for you

## 2019-07-08 DIAGNOSIS — E16.1 HYPERINSULINEMIA: Primary | ICD-10-CM

## 2019-07-08 DIAGNOSIS — Z87.42 HISTORY OF POLYCYSTIC OVARIAN SYNDROME: ICD-10-CM

## 2019-07-08 DIAGNOSIS — R63.5 ABNORMAL WEIGHT GAIN: ICD-10-CM

## 2019-07-08 DIAGNOSIS — E66.01 OBESITY, CLASS III, BMI 40-49.9 (MORBID OBESITY) (HCC): ICD-10-CM

## 2019-07-15 ENCOUNTER — OFFICE VISIT (OUTPATIENT)
Dept: CARDIOLOGY CLINIC | Facility: CLINIC | Age: 24
End: 2019-07-15
Payer: COMMERCIAL

## 2019-07-15 VITALS
BODY MASS INDEX: 42.06 KG/M2 | WEIGHT: 237.4 LBS | RESPIRATION RATE: 18 BRPM | HEIGHT: 63 IN | HEART RATE: 75 BPM | SYSTOLIC BLOOD PRESSURE: 125 MMHG | DIASTOLIC BLOOD PRESSURE: 80 MMHG

## 2019-07-15 DIAGNOSIS — E66.01 MORBID OBESITY (HCC): ICD-10-CM

## 2019-07-15 PROCEDURE — 93000 ELECTROCARDIOGRAM COMPLETE: CPT | Performed by: INTERNAL MEDICINE

## 2019-07-15 PROCEDURE — 99204 OFFICE O/P NEW MOD 45 MIN: CPT | Performed by: INTERNAL MEDICINE

## 2019-07-15 NOTE — PROGRESS NOTES
Assessment/Plan: Morbid obesity ([facility] 75 )  Patient has a history of obesity and is being considered for bariatric surgery  She has no risk factors of hypertension, diabetes mellitus or hypercholesterolemia  I will be arranging for the patient to have a regular exercise stress test before proceeding with surgery  Diagnoses and all orders for this visit:    Morbid obesity ([facility] 75 )  -     Ambulatory referral to Cardiology  -     POCT ECG  -     Stress test only, exercise; Future          Subjective:  Feels well  Patient ID: Lisa Fam is a 20 y o  female  Patient presented to this office for the purpose of cardiac evaluation and consultation in anticipation of bariatric surgery  She feels rather well denying any symptoms of chest pain, shortness of breath, palpitation, dizziness or lightheadedness  She has no leg edema  Patient denies any history of hypertension, diabetes mellitus or hypercholesteremia  The following portions of the patient's history were reviewed and updated as appropriate: allergies, current medications, past family history, past medical history, past social history, past surgical history and problem list     Review of Systems   Respiratory: Negative for apnea, cough, chest tightness, shortness of breath and wheezing  Cardiovascular: Negative for chest pain, palpitations and leg swelling  Gastrointestinal: Negative for abdominal pain  Neurological: Negative for dizziness and light-headedness  Objective:  Stable cardiac-wise  /80 (BP Location: Right arm, Patient Position: Sitting)   Pulse 75   Resp 18   Ht 5' 3" (1 6 m)   Wt 108 kg (237 lb 6 4 oz)   LMP 06/30/2019 (Exact Date)   BMI 42 05 kg/m²          Physical Exam   Constitutional: She is oriented to person, place, and time  She appears well-developed and well-nourished  No distress  HENT:   Head: Normocephalic and atraumatic  Neck: Normal range of motion  Neck supple  No JVD present  No thyromegaly present  Cardiovascular: Normal rate, regular rhythm, S1 normal and S2 normal  Exam reveals no gallop and no friction rub  No murmur heard  Pulmonary/Chest: Effort normal  No respiratory distress  She has no wheezes  She has no rales  She exhibits no tenderness  Abdominal: Soft  Musculoskeletal: She exhibits no edema  Neurological: She is alert and oriented to person, place, and time  Skin: Skin is warm and dry  She is not diaphoretic  Psychiatric: She has a normal mood and affect  Vitals reviewed

## 2019-07-15 NOTE — LETTER
July 15, 2019     Laurence Perera, [address] [facility]  [address] [address]  [address] [address]    Patient: Slime Ramirez   YOB: 1995   Date of Visit: 7/15/2019       Dear Dr Cedrick Rosa:    Thank you for referring Eva Infante to me for evaluation  Below are my notes for this consultation  If you have questions, please do not hesitate to call me  I look forward to following your patient along with you  Sincerely,        Sarah Cruz MD        CC: MD Sarah Matias MD  7/15/2019  3:23 PM  Sign at close encounter  Assessment/Plan: Morbid obesity ([facility] )  Patient has a history of obesity and is being considered for bariatric surgery  She has no risk factors of hypertension, diabetes mellitus or hypercholesterolemia  I will be arranging for the patient to have a regular exercise stress test before proceeding with surgery  Diagnoses and all orders for this visit:    Morbid obesity ([facility] )  -     Ambulatory referral to Cardiology  -     POCT ECG  -     Stress test only, exercise; Future          Subjective:  Feels well  Patient ID: Slime Ramirez is a 20 y o  female  Patient presented to this office for the purpose of cardiac evaluation and consultation in anticipation of bariatric surgery  She feels rather well denying any symptoms of chest pain, shortness of breath, palpitation, dizziness or lightheadedness  She has no leg edema  Patient denies any history of hypertension, diabetes mellitus or hypercholesteremia  The following portions of the patient's history were reviewed and updated as appropriate: allergies, current medications, past family history, past medical history, past social history, past surgical history and problem list     Review of Systems   Respiratory: Negative for apnea, cough, chest tightness, shortness of breath and wheezing  Cardiovascular: Negative for chest pain, palpitations and leg swelling     Gastrointestinal: Negative for abdominal pain  Neurological: Negative for dizziness and light-headedness  Objective:  Stable cardiac-wise  /80 (BP Location: Right arm, Patient Position: Sitting)   Pulse 75   Resp 18   Ht 5' 3" (1 6 m)   Wt 108 kg (237 lb 6 4 oz)   LMP 06/30/2019 (Exact Date)   BMI 42 05 kg/m²           Physical Exam   Constitutional: She is oriented to person, place, and time  She appears well-developed and well-nourished  No distress  HENT:   Head: Normocephalic and atraumatic  Neck: Normal range of motion  Neck supple  No JVD present  No thyromegaly present  Cardiovascular: Normal rate, regular rhythm, S1 normal and S2 normal  Exam reveals no gallop and no friction rub  No murmur heard  Pulmonary/Chest: Effort normal  No respiratory distress  She has no wheezes  She has no rales  She exhibits no tenderness  Abdominal: Soft  Musculoskeletal: She exhibits no edema  Neurological: She is alert and oriented to person, place, and time  Skin: Skin is warm and dry  She is not diaphoretic  Psychiatric: She has a normal mood and affect  Vitals reviewed

## 2019-07-15 NOTE — ASSESSMENT & PLAN NOTE
Patient has a history of obesity and is being considered for bariatric surgery  She has no risk factors of hypertension, diabetes mellitus or hypercholesterolemia  I will be arranging for the patient to have a regular exercise stress test before proceeding with surgery

## 2019-07-17 PROBLEM — E28.2 POLYCYSTIC OVARIAN DISEASE: Status: ACTIVE | Noted: 2019-07-17

## 2019-07-17 LAB — HBA1C MFR BLD HPLC: 5.4 %

## 2019-07-18 ENCOUNTER — TELEPHONE (OUTPATIENT)
Dept: FAMILY MEDICINE CLINIC | Facility: CLINIC | Age: 24
End: 2019-07-18

## 2019-07-18 NOTE — TELEPHONE ENCOUNTER
LEFT VM FOR PT TO CALL BACK TO SCHEDULE    IF PT CALLS BACK PLEASE SCHEDULE  DR BOWMAN HAS OPENING ON AUGUST 19TH

## 2019-07-18 NOTE — TELEPHONE ENCOUNTER
Patient called to schedule a Post Op follow up to her weight loss surgery she is having on 8/12, she is looking to schedule her follow up on 8/19 or  8/21 but I don't see that you have anything, I asked if she would be willing to see another provider and she asked if I can send you a message to see if you can squeeze her in  Please advise        Patient is aware you are out of office till Monday

## 2019-07-20 DIAGNOSIS — IMO0001 CONTRACEPTION: Primary | ICD-10-CM

## 2019-07-22 ENCOUNTER — TELEPHONE (OUTPATIENT)
Dept: OBGYN CLINIC | Facility: CLINIC | Age: 24
End: 2019-07-22

## 2019-07-22 ENCOUNTER — HOSPITAL ENCOUNTER (OUTPATIENT)
Dept: NON INVASIVE DIAGNOSTICS | Facility: CLINIC | Age: 24
Discharge: HOME | End: 2019-07-22
Payer: COMMERCIAL

## 2019-07-22 DIAGNOSIS — E66.01 MORBID OBESITY (HCC): ICD-10-CM

## 2019-07-22 LAB
CHEST PAIN STATEMENT: NORMAL
MAX DIASTOLIC BP: 80 MMHG
MAX HEART RATE: 190 BPM
MAX PREDICTED HEART RATE: 195 BPM
MAX. SYSTOLIC BP: 160 MMHG
PROTOCOL NAME: NORMAL
REASON FOR TERMINATION: NORMAL
TARGET HR FORMULA: NORMAL
TEST INDICATION: NORMAL
TIME IN EXERCISE PHASE: NORMAL

## 2019-07-22 PROCEDURE — 93017 CV STRESS TEST TRACING ONLY: CPT

## 2019-07-22 PROCEDURE — 93018 CV STRESS TEST I&R ONLY: CPT | Performed by: INTERNAL MEDICINE

## 2019-07-22 PROCEDURE — 93016 CV STRESS TEST SUPVJ ONLY: CPT | Performed by: INTERNAL MEDICINE

## 2019-07-22 RX ORDER — LEVONORGESTREL AND ETHINYL ESTRADIOL 0.1-0.02MG
KIT ORAL
Qty: 84 TABLET | Refills: 1 | Status: ON HOLD | OUTPATIENT
Start: 2019-07-22 | End: 2019-08-13 | Stop reason: ALTCHOICE

## 2019-07-30 NOTE — PRE-PROCEDURE INSTRUCTIONS
Pre-Surgery Instructions:   Medication Instructions    LARISSIA 0 1-20 MG-MCG per tablet Patient was instructed to contact Physician for medication instruction   tretinoin (RETIN-A) 0 025 % cream Instructed patient per Anesthesia Guidelines   triamcinolone (KENALOG) 0 1 % ointment Instructed patient per Anesthesia Guidelines  Patient given/ instructed on use of chlorhexidine soap per hospital protocol    Patient instructed to stop all ASA, NSAIDS, vitamins and herbal supplements one week prior to surgery or per Dr Cara Meigs

## 2019-07-31 DIAGNOSIS — E66.01 MORBID (SEVERE) OBESITY DUE TO EXCESS CALORIES (HCC): Primary | ICD-10-CM

## 2019-07-31 RX ORDER — OXYCODONE HYDROCHLORIDE 5 MG/1
5 TABLET ORAL EVERY 6 HOURS PRN
Qty: 15 TABLET | Refills: 0 | Status: SHIPPED | OUTPATIENT
Start: 2019-07-31 | End: 2019-08-22 | Stop reason: ALTCHOICE

## 2019-07-31 RX ORDER — OMEPRAZOLE 20 MG/1
20 CAPSULE, DELAYED RELEASE ORAL DAILY
Qty: 90 CAPSULE | Refills: 1 | Status: SHIPPED | OUTPATIENT
Start: 2019-07-31 | End: 2019-08-01 | Stop reason: SDUPTHER

## 2019-08-01 ENCOUNTER — OFFICE VISIT (OUTPATIENT)
Dept: BARIATRICS | Facility: CLINIC | Age: 24
End: 2019-08-01
Payer: COMMERCIAL

## 2019-08-01 VITALS
WEIGHT: 238 LBS | HEART RATE: 96 BPM | DIASTOLIC BLOOD PRESSURE: 78 MMHG | BODY MASS INDEX: 42.17 KG/M2 | TEMPERATURE: 98.9 F | SYSTOLIC BLOOD PRESSURE: 116 MMHG | HEIGHT: 63 IN

## 2019-08-01 DIAGNOSIS — E66.01 MORBID (SEVERE) OBESITY DUE TO EXCESS CALORIES (HCC): ICD-10-CM

## 2019-08-01 DIAGNOSIS — E66.01 OBESITY, CLASS III, BMI 40-49.9 (MORBID OBESITY) (HCC): Primary | ICD-10-CM

## 2019-08-01 PROCEDURE — 99214 OFFICE O/P EST MOD 30 MIN: CPT | Performed by: SURGERY

## 2019-08-01 RX ORDER — SCOLOPAMINE TRANSDERMAL SYSTEM 1 MG/1
1 PATCH, EXTENDED RELEASE TRANSDERMAL ONCE
Status: CANCELLED | OUTPATIENT
Start: 2019-08-13 | End: 2019-08-01

## 2019-08-01 RX ORDER — GABAPENTIN 300 MG/1
600 CAPSULE ORAL ONCE
Status: CANCELLED | OUTPATIENT
Start: 2019-08-13 | End: 2019-08-01

## 2019-08-01 RX ORDER — OMEPRAZOLE 20 MG/1
20 CAPSULE, DELAYED RELEASE ORAL DAILY
Qty: 90 CAPSULE | Refills: 1 | Status: SHIPPED | OUTPATIENT
Start: 2019-08-01 | End: 2019-10-22 | Stop reason: ALTCHOICE

## 2019-08-01 RX ORDER — ACETAMINOPHEN 325 MG/1
975 TABLET ORAL ONCE
Status: CANCELLED | OUTPATIENT
Start: 2019-08-13 | End: 2019-08-01

## 2019-08-01 RX ORDER — CEFAZOLIN SODIUM 2 G/50ML
2000 SOLUTION INTRAVENOUS ONCE
Status: CANCELLED | OUTPATIENT
Start: 2019-08-13 | End: 2019-08-01

## 2019-08-01 RX ORDER — CELECOXIB 200 MG/1
200 CAPSULE ORAL ONCE
Status: CANCELLED | OUTPATIENT
Start: 2019-08-13 | End: 2019-08-01

## 2019-08-01 NOTE — H&P (VIEW-ONLY)
BARIATRIC HISTORY AND PHYSICAL - BARIATRIC SURGERY  Hudson Zamorano 20 y o  female MRN: [id number]  Unit/Bed#:  Encounter: [id number]      HPI:  Hudson Zamorano is a 20 y o  female who presents to review her preoperative workup and see if she is a good candidate to undergo a bariatric procedure    Review of Systems   Constitutional: Negative  HENT: Negative  Eyes: Negative  Respiratory: Negative  Cardiovascular: Negative  Gastrointestinal: Negative  Endocrine: Negative  Genitourinary: Negative  Musculoskeletal: Negative  Skin: Negative  Neurological: Negative  Hematological: Negative  Psychiatric/Behavioral: Negative          Historical Information   Past Medical History:   Diagnosis Date    Acne     Asthma     exercise induced asthma/no recent problems    Eczema     Exercises 1 to 2 times per week     occas 3x/week    External hemorrhoids     last assessed 8/15/12; resolved 6/13/14    Fatigue     last assessed 4/8/13; resolved 6/13/14    Obesity     PCOS (polycystic ovarian syndrome)      Past Surgical History:   Procedure Laterality Date    EGD      TX REDUCTION OF LARGE BREAST Bilateral 2/22/2018    Procedure: BREAST REDUCTION;  Surgeon: Azul Villavicencio MD;  [facility]: [facility];  Service: Plastics    REDUCTION MAMMAPLASTY      WISDOM TOOTH EXTRACTION       Social History   Social History     Substance and Sexual Activity   Alcohol Use Not Currently    Comment: social     Social History     Substance and Sexual Activity   Drug Use No     Social History     Tobacco Use   Smoking Status Never Smoker   Smokeless Tobacco Never Used     Family History: non-contributory    Meds/Allergies   all medications and allergies reviewed  No Known Allergies    Objective     Current Vitals:   Blood Pressure: 116/78 (08/01/19 1310)  Pulse: 96 (08/01/19 1310)  Temperature: 98 9 °F (37 2 °C) (08/01/19 1310)  Temp Source: Tympanic (08/01/19 1310)  Height: 5' 3" (160 cm) (08/01/19 1310)  Weight - Scale: 108 kg (238 lb) (08/01/19 1310)  bowel movement  [unfilled]    Invasive Devices     None                 Physical Exam   Constitutional: She is oriented to person, place, and time  She appears well-developed  HENT:   Head: Normocephalic and atraumatic  Eyes: Conjunctivae and EOM are normal    Neck: Normal range of motion  Neck supple  Cardiovascular: Normal rate, regular rhythm, normal heart sounds and intact distal pulses  Pulmonary/Chest: Effort normal and breath sounds normal    Abdominal: Soft  Bowel sounds are normal    Musculoskeletal: Normal range of motion  Neurological: She is alert and oriented to person, place, and time  She has normal reflexes  Skin: Skin is warm and dry  Psychiatric: She has a normal mood and affect  Lab Results: I have personally reviewed pertinent lab results  Imaging: I have personally reviewed pertinent reports  EKG, Pathology, and Other Studies: I have personally reviewed pertinent reports  Code Status: [unfilled]  Advance Directive and Living Will:      Power of :    POLST:      Assessment/Plan:      The patient presented to review the preoperative workup and see if bariatric surgery is appropriate and indicated following the extensive preoperative workup and the enrollment in our weight loss program    Preoperative workup was complete  Results were reviewed with the patient including the blood work results and the endoscopy findings and the biopsy results  We also reviewed the cardiology evaluation  I believe that the patient will be a good candidate for laparoscopic sleeve gastrectomy  Patient will need to start the 2 week liquid diet prior to surgery  Risks and benefits explained one more time to the patient  Alternatives to surgery and alternative forms of surgery were also explained  Post-surgical commitment and after care programs were explained    We also discussed that the [employer] robot may be available to us to use on the day of the patient procedure  and that the procedure may be performed robotically  I discussed in details the advantages and disadvantages of this approach including the potential decrease in postoperative pain because of the remote center technology  I also mentioned the lack of strong evidence for  the use of robot in bariatric patients and the potential disadvantage to patients because of the prolonged operative time  Consent was signed  Questions were answered and concerns were addressed  Patient wishes to proceed  As per  [address] [place] [address] guidelines, I had a discussion with the patient regarding his CODE STATUS in the perioperative period and the patient is level 1 or FULL CODE STATUS

## 2019-08-01 NOTE — PROGRESS NOTES
BARIATRIC HISTORY AND PHYSICAL - BARIATRIC SURGERY  Jose Peters 20 y o  female MRN: [id number]  Unit/Bed#:  Encounter: [id number]      HPI:  Jose Peters is a 20 y o  female who presents to review her preoperative workup and see if she is a good candidate to undergo a bariatric procedure    Review of Systems   Constitutional: Negative  HENT: Negative  Eyes: Negative  Respiratory: Negative  Cardiovascular: Negative  Gastrointestinal: Negative  Endocrine: Negative  Genitourinary: Negative  Musculoskeletal: Negative  Skin: Negative  Neurological: Negative  Hematological: Negative  Psychiatric/Behavioral: Negative          Historical Information   Past Medical History:   Diagnosis Date    Acne     Asthma     exercise induced asthma/no recent problems    Eczema     Exercises 1 to 2 times per week     occas 3x/week    External hemorrhoids     last assessed 8/15/12; resolved 6/13/14    Fatigue     last assessed 4/8/13; resolved 6/13/14    Obesity     PCOS (polycystic ovarian syndrome)      Past Surgical History:   Procedure Laterality Date    EGD      [place] REDUCTION OF LARGE BREAST Bilateral 2/22/2018    Procedure: BREAST REDUCTION;  Surgeon: Aldo Bobo MD;  [facility];  Service: Plastics    REDUCTION MAMMAPLASTY      WISDOM TOOTH EXTRACTION       Social History   Social History     Substance and Sexual Activity   Alcohol Use Not Currently    Comment: social     Social History     Substance and Sexual Activity   Drug Use No     Social History     Tobacco Use   Smoking Status Never Smoker   Smokeless Tobacco Never Used     Family History: non-contributory    Meds/Allergies   all medications and allergies reviewed  No Known Allergies    Objective     Current Vitals:   Blood Pressure: 116/78 (08/01/19 1310)  Pulse: 96 (08/01/19 1310)  Temperature: 98 9 °F (37 2 °C) (08/01/19 1310)  Temp Source: Tympanic (08/01/19 1310)  Height: 5' 3" (160 cm) (08/01/19 1310)  Weight - Scale: 108 kg (238 lb) (08/01/19 1310)  bowel movement  [unfilled]    Invasive Devices     None                 Physical Exam   Constitutional: She is oriented to person, place, and time  She appears well-developed  HENT:   Head: Normocephalic and atraumatic  Eyes: Conjunctivae and EOM are normal    Neck: Normal range of motion  Neck supple  Cardiovascular: Normal rate, regular rhythm, normal heart sounds and intact distal pulses  Pulmonary/Chest: Effort normal and breath sounds normal    Abdominal: Soft  Bowel sounds are normal    Musculoskeletal: Normal range of motion  Neurological: She is alert and oriented to person, place, and time  She has normal reflexes  Skin: Skin is warm and dry  Psychiatric: She has a normal mood and affect  Lab Results: I have personally reviewed pertinent lab results  Imaging: I have personally reviewed pertinent reports  EKG, Pathology, and Other Studies: I have personally reviewed pertinent reports  Code Status: [unfilled]  Advance Directive and Living Will:      Power of :    POLST:      Assessment/Plan:      The patient presented to review the preoperative workup and see if bariatric surgery is appropriate and indicated following the extensive preoperative workup and the enrollment in our weight loss program    Preoperative workup was complete  Results were reviewed with the patient including the blood work results and the endoscopy findings and the biopsy results  We also reviewed the cardiology evaluation  I believe that the patient will be a good candidate for laparoscopic sleeve gastrectomy  Patient will need to start the 2 week liquid diet prior to surgery  Risks and benefits explained one more time to the patient  Alternatives to surgery and alternative forms of surgery were also explained  Post-surgical commitment and after care programs were explained    We also discussed that the [employer] robot may be available to us to use on the day of the patient procedure  and that the procedure may be performed robotically  I discussed in details the advantages and disadvantages of this approach including the potential decrease in postoperative pain because of the remote center technology  I also mentioned the lack of strong evidence for  the use of robot in bariatric patients and the potential disadvantage to patients because of the prolonged operative time  Consent was signed  Questions were answered and concerns were addressed  Patient wishes to proceed  As per  [address] [place] [address] guidelines, I had a discussion with the patient regarding his CODE STATUS in the perioperative period and the patient is level 1 or FULL CODE STATUS

## 2019-08-05 ENCOUNTER — TELEPHONE (OUTPATIENT)
Dept: BARIATRICS | Facility: CLINIC | Age: 24
End: 2019-08-05

## 2019-08-08 ENCOUNTER — TELEPHONE (OUTPATIENT)
Dept: BARIATRICS | Facility: CLINIC | Age: 24
End: 2019-08-08

## 2019-08-08 NOTE — TELEPHONE ENCOUNTER
PAT's called about this pt last BW  It was a letter sent to the pt stating that BW was within normal limits for Bariatric sx with the date of 07/22/19 , but the results couldn't been found in EPIC  I called the pt and she said had it done @ [email or web address], I called and she had it done on 07/17/19, a copy will be fax to us

## 2019-08-09 DIAGNOSIS — E66.01 MORBID (SEVERE) OBESITY DUE TO EXCESS CALORIES (HCC): Primary | ICD-10-CM

## 2019-08-12 ENCOUNTER — ANESTHESIA EVENT (OUTPATIENT)
Dept: PERIOP | Facility: HOSPITAL | Age: 24
DRG: 621 | End: 2019-08-12
Payer: COMMERCIAL

## 2019-08-13 ENCOUNTER — ANESTHESIA (OUTPATIENT)
Dept: PERIOP | Facility: HOSPITAL | Age: 24
DRG: 621 | End: 2019-08-13
Payer: COMMERCIAL

## 2019-08-13 ENCOUNTER — HOSPITAL ENCOUNTER (INPATIENT)
Facility: HOSPITAL | Age: 24
LOS: 1 days | Discharge: HOME | DRG: 621 | End: 2019-08-14
Attending: SURGERY | Admitting: SURGERY
Payer: COMMERCIAL

## 2019-08-13 DIAGNOSIS — E66.01 MORBID OBESITY (HCC): ICD-10-CM

## 2019-08-13 DIAGNOSIS — E28.2 POLYCYSTIC OVARIAN DISEASE: ICD-10-CM

## 2019-08-13 LAB
EXT PREGNANCY TEST URINE: NEGATIVE
EXT. CONTROL: NORMAL

## 2019-08-13 PROCEDURE — 0DB64Z3 EXCISION OF STOMACH, PERCUTANEOUS ENDOSCOPIC APPROACH, VERTICAL: ICD-10-PCS | Performed by: SURGERY

## 2019-08-13 PROCEDURE — 43775 LAP SLEEVE GASTRECTOMY: CPT | Performed by: SURGERY

## 2019-08-13 PROCEDURE — 81025 URINE PREGNANCY TEST: CPT | Performed by: ANESTHESIOLOGY

## 2019-08-13 PROCEDURE — 88307 TISSUE EXAM BY PATHOLOGIST: CPT | Performed by: PATHOLOGY

## 2019-08-13 PROCEDURE — 0DJ08ZZ INSPECTION OF UPPER INTESTINAL TRACT, VIA NATURAL OR ARTIFICIAL OPENING ENDOSCOPIC: ICD-10-PCS | Performed by: SURGERY

## 2019-08-13 PROCEDURE — 8E0W4CZ ROBOTIC ASSISTED PROCEDURE OF TRUNK REGION, PERCUTANEOUS ENDOSCOPIC APPROACH: ICD-10-PCS | Performed by: SURGERY

## 2019-08-13 PROCEDURE — C9290 INJ, BUPIVACAINE LIPOSOME: HCPCS | Performed by: SURGERY

## 2019-08-13 RX ORDER — PROMETHAZINE HYDROCHLORIDE 25 MG/ML
12.5 INJECTION, SOLUTION INTRAMUSCULAR; INTRAVENOUS EVERY 4 HOURS PRN
Status: DISCONTINUED | OUTPATIENT
Start: 2019-08-13 | End: 2019-08-13 | Stop reason: HOSPADM

## 2019-08-13 RX ORDER — SCOLOPAMINE TRANSDERMAL SYSTEM 1 MG/1
1 PATCH, EXTENDED RELEASE TRANSDERMAL ONCE
Status: DISCONTINUED | OUTPATIENT
Start: 2019-08-13 | End: 2019-08-14 | Stop reason: HOSPADM

## 2019-08-13 RX ORDER — CEFAZOLIN SODIUM 2 G/50ML
2000 SOLUTION INTRAVENOUS ONCE
Status: COMPLETED | OUTPATIENT
Start: 2019-08-13 | End: 2019-08-13

## 2019-08-13 RX ORDER — METOCLOPRAMIDE HYDROCHLORIDE 5 MG/ML
10 INJECTION INTRAMUSCULAR; INTRAVENOUS EVERY 6 HOURS PRN
Status: DISCONTINUED | OUTPATIENT
Start: 2019-08-13 | End: 2019-08-14 | Stop reason: HOSPADM

## 2019-08-13 RX ORDER — 0.9 % SODIUM CHLORIDE 0.9 %
VIAL (ML) INJECTION AS NEEDED
Status: DISCONTINUED | OUTPATIENT
Start: 2019-08-13 | End: 2019-08-13 | Stop reason: HOSPADM

## 2019-08-13 RX ORDER — ACETAMINOPHEN 325 MG/1
975 TABLET ORAL ONCE
Status: COMPLETED | OUTPATIENT
Start: 2019-08-13 | End: 2019-08-13

## 2019-08-13 RX ORDER — HYDROMORPHONE HCL/PF 1 MG/ML
0.5 SYRINGE (ML) INJECTION
Status: DISCONTINUED | OUTPATIENT
Start: 2019-08-13 | End: 2019-08-13 | Stop reason: HOSPADM

## 2019-08-13 RX ORDER — GABAPENTIN 300 MG/1
600 CAPSULE ORAL ONCE
Status: COMPLETED | OUTPATIENT
Start: 2019-08-13 | End: 2019-08-13

## 2019-08-13 RX ORDER — MORPHINE SULFATE 4 MG/ML
4 INJECTION, SOLUTION INTRAMUSCULAR; INTRAVENOUS EVERY 2 HOUR PRN
Status: DISCONTINUED | OUTPATIENT
Start: 2019-08-13 | End: 2019-08-14 | Stop reason: HOSPADM

## 2019-08-13 RX ORDER — SODIUM CHLORIDE, SODIUM LACTATE, POTASSIUM CHLORIDE, CALCIUM CHLORIDE 600; 310; 30; 20 MG/100ML; MG/100ML; MG/100ML; MG/100ML
75 INJECTION, SOLUTION INTRAVENOUS CONTINUOUS
Status: DISCONTINUED | OUTPATIENT
Start: 2019-08-13 | End: 2019-08-14 | Stop reason: HOSPADM

## 2019-08-13 RX ORDER — DEXAMETHASONE SODIUM PHOSPHATE 10 MG/ML
INJECTION, SOLUTION INTRAMUSCULAR; INTRAVENOUS AS NEEDED
Status: DISCONTINUED | OUTPATIENT
Start: 2019-08-13 | End: 2019-08-13 | Stop reason: SURG

## 2019-08-13 RX ORDER — FENTANYL CITRATE 50 UG/ML
25 INJECTION, SOLUTION INTRAMUSCULAR; INTRAVENOUS
Status: DISCONTINUED | OUTPATIENT
Start: 2019-08-13 | End: 2019-08-13 | Stop reason: HOSPADM

## 2019-08-13 RX ORDER — ONDANSETRON 2 MG/ML
INJECTION INTRAMUSCULAR; INTRAVENOUS AS NEEDED
Status: DISCONTINUED | OUTPATIENT
Start: 2019-08-13 | End: 2019-08-13 | Stop reason: SURG

## 2019-08-13 RX ORDER — MIDAZOLAM HYDROCHLORIDE 1 MG/ML
INJECTION INTRAMUSCULAR; INTRAVENOUS AS NEEDED
Status: DISCONTINUED | OUTPATIENT
Start: 2019-08-13 | End: 2019-08-13 | Stop reason: SURG

## 2019-08-13 RX ORDER — SODIUM CHLORIDE 9 MG/ML
125 INJECTION, SOLUTION INTRAVENOUS CONTINUOUS
Status: DISCONTINUED | OUTPATIENT
Start: 2019-08-13 | End: 2019-08-14 | Stop reason: HOSPADM

## 2019-08-13 RX ORDER — ONDANSETRON 2 MG/ML
4 INJECTION INTRAMUSCULAR; INTRAVENOUS EVERY 6 HOURS PRN
Status: DISCONTINUED | OUTPATIENT
Start: 2019-08-13 | End: 2019-08-13 | Stop reason: HOSPADM

## 2019-08-13 RX ORDER — MAGNESIUM HYDROXIDE 1200 MG/15ML
LIQUID ORAL AS NEEDED
Status: DISCONTINUED | OUTPATIENT
Start: 2019-08-13 | End: 2019-08-13 | Stop reason: HOSPADM

## 2019-08-13 RX ORDER — GLYCOPYRROLATE 0.2 MG/ML
INJECTION INTRAMUSCULAR; INTRAVENOUS AS NEEDED
Status: DISCONTINUED | OUTPATIENT
Start: 2019-08-13 | End: 2019-08-13 | Stop reason: SURG

## 2019-08-13 RX ORDER — NEOSTIGMINE METHYLSULFATE 1 MG/ML
INJECTION INTRAVENOUS AS NEEDED
Status: DISCONTINUED | OUTPATIENT
Start: 2019-08-13 | End: 2019-08-13 | Stop reason: SURG

## 2019-08-13 RX ORDER — HYDROMORPHONE HYDROCHLORIDE 2 MG/ML
INJECTION, SOLUTION INTRAMUSCULAR; INTRAVENOUS; SUBCUTANEOUS AS NEEDED
Status: DISCONTINUED | OUTPATIENT
Start: 2019-08-13 | End: 2019-08-13 | Stop reason: SURG

## 2019-08-13 RX ORDER — LIDOCAINE HYDROCHLORIDE 10 MG/ML
INJECTION, SOLUTION INFILTRATION; PERINEURAL AS NEEDED
Status: DISCONTINUED | OUTPATIENT
Start: 2019-08-13 | End: 2019-08-13 | Stop reason: SURG

## 2019-08-13 RX ORDER — ROCURONIUM BROMIDE 10 MG/ML
INJECTION, SOLUTION INTRAVENOUS AS NEEDED
Status: DISCONTINUED | OUTPATIENT
Start: 2019-08-13 | End: 2019-08-13 | Stop reason: SURG

## 2019-08-13 RX ORDER — CELECOXIB 200 MG/1
200 CAPSULE ORAL ONCE
Status: COMPLETED | OUTPATIENT
Start: 2019-08-13 | End: 2019-08-13

## 2019-08-13 RX ORDER — ONDANSETRON 2 MG/ML
4 INJECTION INTRAMUSCULAR; INTRAVENOUS EVERY 6 HOURS
Status: DISCONTINUED | OUTPATIENT
Start: 2019-08-13 | End: 2019-08-14 | Stop reason: HOSPADM

## 2019-08-13 RX ORDER — OXYCODONE HYDROCHLORIDE 5 MG/1
10 TABLET ORAL EVERY 4 HOURS PRN
Status: DISCONTINUED | OUTPATIENT
Start: 2019-08-13 | End: 2019-08-14 | Stop reason: HOSPADM

## 2019-08-13 RX ORDER — OXYCODONE HYDROCHLORIDE 5 MG/1
5 TABLET ORAL EVERY 4 HOURS PRN
Status: DISCONTINUED | OUTPATIENT
Start: 2019-08-13 | End: 2019-08-14 | Stop reason: HOSPADM

## 2019-08-13 RX ORDER — PROPOFOL 10 MG/ML
INJECTION, EMULSION INTRAVENOUS AS NEEDED
Status: DISCONTINUED | OUTPATIENT
Start: 2019-08-13 | End: 2019-08-13 | Stop reason: SURG

## 2019-08-13 RX ORDER — FENTANYL CITRATE 50 UG/ML
INJECTION, SOLUTION INTRAMUSCULAR; INTRAVENOUS AS NEEDED
Status: DISCONTINUED | OUTPATIENT
Start: 2019-08-13 | End: 2019-08-13 | Stop reason: SURG

## 2019-08-13 RX ORDER — ACETAMINOPHEN 325 MG/1
975 TABLET ORAL EVERY 8 HOURS SCHEDULED
Status: DISCONTINUED | OUTPATIENT
Start: 2019-08-13 | End: 2019-08-14 | Stop reason: HOSPADM

## 2019-08-13 RX ORDER — BUPIVACAINE HYDROCHLORIDE 5 MG/ML
INJECTION, SOLUTION PERINEURAL AS NEEDED
Status: DISCONTINUED | OUTPATIENT
Start: 2019-08-13 | End: 2019-08-13 | Stop reason: HOSPADM

## 2019-08-13 RX ORDER — PROMETHAZINE HYDROCHLORIDE 25 MG/ML
25 INJECTION, SOLUTION INTRAMUSCULAR; INTRAVENOUS EVERY 6 HOURS PRN
Status: DISCONTINUED | OUTPATIENT
Start: 2019-08-13 | End: 2019-08-14 | Stop reason: HOSPADM

## 2019-08-13 RX ADMIN — ROCURONIUM BROMIDE 50 MG: 100 INJECTION, SOLUTION INTRAVENOUS at 15:59

## 2019-08-13 RX ADMIN — GLYCOPYRROLATE 0.4 MG: 0.2 INJECTION INTRAMUSCULAR; INTRAVENOUS at 17:02

## 2019-08-13 RX ADMIN — ONDANSETRON 8 MG: 2 INJECTION INTRAMUSCULAR; INTRAVENOUS at 16:12

## 2019-08-13 RX ADMIN — NEOSTIGMINE METHYLSULFATE 3 MG: 1 INJECTION, SOLUTION INTRAVENOUS at 17:02

## 2019-08-13 RX ADMIN — SODIUM CHLORIDE, SODIUM LACTATE, POTASSIUM CHLORIDE, AND CALCIUM CHLORIDE 75 ML/HR: .6; .31; .03; .02 INJECTION, SOLUTION INTRAVENOUS at 18:34

## 2019-08-13 RX ADMIN — MIDAZOLAM 2 MG: 1 INJECTION INTRAMUSCULAR; INTRAVENOUS at 15:46

## 2019-08-13 RX ADMIN — ENOXAPARIN SODIUM 40 MG: 40 INJECTION SUBCUTANEOUS at 13:50

## 2019-08-13 RX ADMIN — DEXAMETHASONE SODIUM PHOSPHATE 8 MG: 10 INJECTION, SOLUTION INTRAMUSCULAR; INTRAVENOUS at 16:12

## 2019-08-13 RX ADMIN — FAMOTIDINE 20 MG: 10 INJECTION, SOLUTION INTRAVENOUS at 20:07

## 2019-08-13 RX ADMIN — SODIUM CHLORIDE: 0.9 INJECTION, SOLUTION INTRAVENOUS at 15:48

## 2019-08-13 RX ADMIN — FENTANYL CITRATE 50 MCG: 50 INJECTION, SOLUTION INTRAMUSCULAR; INTRAVENOUS at 16:12

## 2019-08-13 RX ADMIN — CELECOXIB 200 MG: 200 CAPSULE ORAL at 12:08

## 2019-08-13 RX ADMIN — PROPOFOL 200 MG: 10 INJECTION, EMULSION INTRAVENOUS at 15:59

## 2019-08-13 RX ADMIN — FENTANYL CITRATE 100 MCG: 50 INJECTION, SOLUTION INTRAMUSCULAR; INTRAVENOUS at 17:17

## 2019-08-13 RX ADMIN — HYDROMORPHONE HYDROCHLORIDE 0.5 MG: 2 INJECTION, SOLUTION INTRAMUSCULAR; INTRAVENOUS; SUBCUTANEOUS at 16:12

## 2019-08-13 RX ADMIN — GABAPENTIN 600 MG: 300 CAPSULE ORAL at 12:08

## 2019-08-13 RX ADMIN — LIDOCAINE HYDROCHLORIDE 60 MG: 10 INJECTION, SOLUTION INFILTRATION; PERINEURAL at 15:59

## 2019-08-13 RX ADMIN — FENTANYL CITRATE 50 MCG: 50 INJECTION, SOLUTION INTRAMUSCULAR; INTRAVENOUS at 17:00

## 2019-08-13 RX ADMIN — SCOPALAMINE 1 PATCH: 1 PATCH, EXTENDED RELEASE TRANSDERMAL at 12:06

## 2019-08-13 RX ADMIN — ACETAMINOPHEN 975 MG: 325 TABLET ORAL at 12:07

## 2019-08-13 RX ADMIN — SODIUM CHLORIDE: 0.9 INJECTION, SOLUTION INTRAVENOUS at 16:41

## 2019-08-13 RX ADMIN — SODIUM CHLORIDE 125 ML/HR: 0.9 INJECTION, SOLUTION INTRAVENOUS at 13:53

## 2019-08-13 RX ADMIN — ACETAMINOPHEN 975 MG: 325 TABLET ORAL at 22:30

## 2019-08-13 RX ADMIN — FENTANYL CITRATE 100 MCG: 50 INJECTION, SOLUTION INTRAMUSCULAR; INTRAVENOUS at 15:59

## 2019-08-13 RX ADMIN — HYDROMORPHONE HYDROCHLORIDE 0.5 MG: 2 INJECTION, SOLUTION INTRAMUSCULAR; INTRAVENOUS; SUBCUTANEOUS at 16:41

## 2019-08-13 RX ADMIN — ONDANSETRON 4 MG: 2 INJECTION INTRAMUSCULAR; INTRAVENOUS at 21:29

## 2019-08-13 RX ADMIN — CEFAZOLIN SODIUM 2000 MG: 2 SOLUTION INTRAVENOUS at 15:46

## 2019-08-13 NOTE — INTERVAL H&P NOTE
H&P reviewed  After examining the patient I find no changes in the patients condition since the H&P had been written      Vitals:    08/13/19 1136   BP: 128/68   Pulse: 91   Resp: 16   Temp: 97 8 °F (36 6 °C)   SpO2: 97%

## 2019-08-13 NOTE — OP NOTE
OPERATIVE REPORT  PATIENT NAME: Jose Peters    :  1995  MRN: [id number]  Pt Location: [facility] OR ROOM 06    SURGERY DATE: 2019    Surgeon(s) and Role:     * Maciej Samaniego MD - Primary     * Patience Gary MD - Fellow    Preop Diagnosis:  Morbid obesity ([facility] ) [E66 01]Body mass index is 42 66 kg/m²  Polycystic ovarian disease [E28 2]    Post-Op Diagnosis Codes:     * Morbid obesity ([facility] ) [E66 01]     * Polycystic ovarian disease [E28 2]    Procedure(s) (LRB):  GASTRECTOMY LAPAROSCOPIC SLEEVE WITH ROBOTICS (N/A)    Specimen(s):  ID Type Source Tests Collected by Time Destination   1 : PORTION OF STOMACH Tissue Stomach TISSUE EXAM Maciej Samaniego MD 2019 1627        Estimated Blood Loss:   20 ml  Drains:  * No LDAs found *    Anesthesia Type:   General    Operative Indications: Morbid obesity ([facility] ) [E66 01]  Polycystic ovarian disease [E28 2]      Operative Findings:  Normal findings    Complications:   None    Procedure and Technique:  Robotic sleeve gastrectomy with intraop endoscopy   I was present for the entire procedure    Patient Disposition:  hemodynamically stable     No qualified resident was available  Assistant was necessary for instrument exchange and counter traction    Indication:   Patient suffers from morbid obesity and associated co-morbidities  and failed to achieve any meaningful or sustainable weight loss and was therefore consented to undergo a laparoscopic sleeve gastrectomy  Risks and benefits were explained to the patient, patient consented for the procedure  Procedure: The patient was brought to the operating room and placed in a supine position  The patient received a dose of IV antibiotics and a dose of subcutaneous Lovenox prior to the procedure  The patient was induced under general endotracheal anesthesia  The abdominal wall was prepped and draped under sterile conditions in the usual fashion   The procedure was started by obtaining access to the abdominal cavity using a Veress needle to the left side of the midline around 6 inches from the xiphoid the abdominal cavity was insufflated with CO2 to a pressure of 15 mmHg  After that, the abdomen was entered with an 8 mm trocar using an Optiview trocar under direct visualization  At that point, an 8-mm trocar and a 12-mm trocar were placed on the right side of the abdominal wall, under direct visualization, and another 8-mm trocar and 12 mm assistant port were placed on the left side of the abdominal wall, also under direct visualization  A TAP block was performed using 20 ml of Exparel mixed with saline and 0 5 % Marcaine for a total of 100 ml  The patient was then placed in a reverse Trendelenburg position  A Weston retractor was placed through a small stab incision below the xiphoid and was used to retract the left lobe of the liver in a medial fashion  The robot was then brought into the surgical field and the arms docked  An OG tube was placed to decompress the stomach and then removed immediately  The angle of His was then dissected using the harmonic scalpel    At that point, we turned our attention to the pylorus and using the vessel sealerl, the gastrocolic ligament was taken down starting 4 cm proximal to the pylorus, all the way up to the level of the short gastric vessels which were taken down with the vessel sealer  as well  The angle of His was also dissected and all the posterior attachments of the fundus were taken down with the vessel sealer and with sharp dissection, the spleen was kept out of harms way and the left micheal was exposed and the stomach was completely mobilized off the left micheal and rotated medially  There were some posterior attachments to the posterior wall of the stomach and those were taken down sharply with laparoscopic scissors  At that point, the anesthesiologist was asked to insert a 36-Persian Bougie    The Bougie was secured in place by the anesthesiologist      We started transecting the stomach using a black 60 mm SureForm cartridge  The following 2 firings consisted of green cartridge loads and the rest of the firings consisted of blue loads  The 36-Icelandic Bougie was kept in place throughout the performance of the sleeve gastrectomy  We made particular attention during the transaction of the greater curvature to retract the stomach laterally at the site of the transected vessels  to prevent corkscrewing of the gastric sleeve  We also avoided getting too close to the incisura to prevent  narrowing at the level of the incisura  The staple line was hemostatic and well formed and there was no evidence of narrowing at the incisura  The staple line was then imbricated using 2-0 V LOC suture in a running fashion while the bougie is in place  At the end, the anesthesiologist was asked to remove the [address]  The surgical field was inspected one more time and appeared hemostatic  We then removed the [facility] liver retractor  The 12 mm port was extended, and then it was dilated  After that, the stomach specimen was retrieved and sent to Pathology  Sponge count was completed and was correct  The 12 mm port was then closed using #1 Vicryl in a simple fashion  All the trocars were removed under direct visualization, and the skin edges were approximated using 4-0 Monocryl in an interrupted, inverted fashion  Histoacryl was then applied to the skin incisions        The patient was extubated and transferred to the PACU in stable condition    SIGNATURE: Russell Molina MD  DATE: August 13, 2019  TIME: 5:03 PM

## 2019-08-13 NOTE — ANESTHESIA PREPROCEDURE EVALUATION
Review of Systems/Medical History  Patient summary reviewed  Chart reviewed  No history of anesthetic complications     Cardiovascular  Negative cardio ROS    Pulmonary  Asthma (exercise induced) ,        GI/Hepatic  Negative GI/hepatic ROS          Negative  ROS        Endo/Other  Diabetes well controlled type 2 Oral agent,   Obesity (43=BMI)  morbid obesity   GYN  Negative gynecology ROS          Hematology  Negative hematology ROS      Musculoskeletal  Back pain , thoracic pain,        Neurology  Negative neurology ROS      Psychology   Negative psychology ROS              Physical Exam    Airway    Mallampati score: II  TM Distance: >3 FB  Neck ROM: full     Dental   Comment: crystal bonding right upper,     Cardiovascular  Comment: Negative ROS, Rhythm: regular, Rate: normal, Cardiovascular exam normal    Pulmonary  Pulmonary exam normal Breath sounds clear to auscultation,     Other Findings        Anesthesia Plan  ASA Score- 3     Anesthesia Type- general with ASA Monitors  Additional Monitors:   Airway Plan: ETT  Comment: SCOP patch ordered  Plan Factors-Patient not instructed to abstain from smoking on day of procedure  Patient did not smoke on day of surgery  Induction- intravenous  Postoperative Plan-     Informed Consent- Anesthetic plan and risks discussed with patient and mother

## 2019-08-13 NOTE — ANESTHESIA POSTPROCEDURE EVALUATION
Post-Op Assessment Note    CV Status:  Stable    Pain management: adequate     Mental Status:  Alert and awake   Hydration Status:  Euvolemic   PONV Controlled:  Controlled   Airway Patency:  Patent   Post Op Vitals Reviewed: Yes      Staff: Anesthesiologist           /80 (08/13/19 1802)    Temp      Pulse 87 (08/13/19 1802)   Resp 12 (08/13/19 1802)    SpO2 93 % (08/13/19 1802)

## 2019-08-14 VITALS
RESPIRATION RATE: 17 BRPM | HEART RATE: 77 BPM | OXYGEN SATURATION: 97 % | WEIGHT: 240.3 LBS | SYSTOLIC BLOOD PRESSURE: 134 MMHG | DIASTOLIC BLOOD PRESSURE: 94 MMHG | BODY MASS INDEX: 42.58 KG/M2 | HEIGHT: 63 IN | TEMPERATURE: 97.1 F

## 2019-08-14 LAB
ANION GAP SERPL CALCULATED.3IONS-SCNC: 11 MMOL/L (ref 4–13)
BUN SERPL-MCNC: 9 MG/DL (ref 5–25)
CALCIUM SERPL-MCNC: 8.6 MG/DL (ref 8.3–10.1)
CHLORIDE SERPL-SCNC: 104 MMOL/L (ref 100–108)
CO2 SERPL-SCNC: 22 MMOL/L (ref 21–32)
CREAT SERPL-MCNC: 0.75 MG/DL (ref 0.6–1.3)
ERYTHROCYTE [DISTWIDTH] IN BLOOD BY AUTOMATED COUNT: 12.7 % (ref 11.6–15.1)
GFR SERPL CREATININE-BSD FRML MDRD: 111 ML/MIN/1.73SQ M
GLUCOSE SERPL-MCNC: 126 MG/DL (ref 65–140)
HCT VFR BLD AUTO: 39.7 % (ref 34.8–46.1)
HGB BLD-MCNC: 12.7 G/DL (ref 11.5–15.4)
MCH RBC QN AUTO: 29.9 PG (ref 26.8–34.3)
MCHC RBC AUTO-ENTMCNC: 32 G/DL (ref 31.4–37.4)
MCV RBC AUTO: 93 FL (ref 82–98)
PLATELET # BLD AUTO: 256 THOUSANDS/UL (ref 149–390)
PMV BLD AUTO: 10.1 FL (ref 8.9–12.7)
POTASSIUM SERPL-SCNC: 4.3 MMOL/L (ref 3.5–5.3)
RBC # BLD AUTO: 4.25 MILLION/UL (ref 3.81–5.12)
SODIUM SERPL-SCNC: 137 MMOL/L (ref 136–145)
WBC # BLD AUTO: 14.34 THOUSAND/UL (ref 4.31–10.16)

## 2019-08-14 PROCEDURE — 85027 COMPLETE CBC AUTOMATED: CPT | Performed by: SURGERY

## 2019-08-14 PROCEDURE — 99024 POSTOP FOLLOW-UP VISIT: CPT | Performed by: SURGERY

## 2019-08-14 PROCEDURE — 80048 BASIC METABOLIC PNL TOTAL CA: CPT | Performed by: SURGERY

## 2019-08-14 PROCEDURE — NC001 PR NO CHARGE: Performed by: SURGERY

## 2019-08-14 RX ORDER — FAMOTIDINE 20 MG/1
20 TABLET, FILM COATED ORAL 2 TIMES DAILY
Status: DISCONTINUED | OUTPATIENT
Start: 2019-08-14 | End: 2019-08-14 | Stop reason: HOSPADM

## 2019-08-14 RX ADMIN — ENOXAPARIN SODIUM 40 MG: 40 INJECTION SUBCUTANEOUS at 09:00

## 2019-08-14 RX ADMIN — ONDANSETRON 4 MG: 2 INJECTION INTRAMUSCULAR; INTRAVENOUS at 09:15

## 2019-08-14 RX ADMIN — ONDANSETRON 4 MG: 2 INJECTION INTRAMUSCULAR; INTRAVENOUS at 02:45

## 2019-08-14 RX ADMIN — OXYCODONE HYDROCHLORIDE 5 MG: 5 TABLET ORAL at 04:20

## 2019-08-14 RX ADMIN — OXYCODONE HYDROCHLORIDE 5 MG: 5 TABLET ORAL at 09:25

## 2019-08-14 RX ADMIN — ACETAMINOPHEN 975 MG: 325 TABLET ORAL at 05:32

## 2019-08-14 RX ADMIN — FAMOTIDINE 20 MG: 10 INJECTION, SOLUTION INTRAVENOUS at 09:15

## 2019-08-14 NOTE — PLAN OF CARE
Problem: PAIN - ADULT  Goal: Verbalizes/displays adequate comfort level or baseline comfort level  Description  Interventions:  - Encourage patient to monitor pain and request assistance  - Assess pain using appropriate pain scale  - Administer analgesics based on type and severity of pain and evaluate response  - Implement non-pharmacological measures as appropriate and evaluate response  - Consider cultural and social influences on pain and pain management  - Notify physician/advanced practitioner if interventions unsuccessful or patient reports new pain  Outcome: Progressing     Problem: INFECTION - ADULT  Goal: Absence or prevention of progression during hospitalization  Description  INTERVENTIONS:  - Assess and monitor for signs and symptoms of infection  - Monitor lab/diagnostic results  - Monitor all insertion sites, i e  indwelling lines, tubes, and drains  - Monitor endotracheal if appropriate and nasal secretions for changes in amount and color  - [place] appropriate cooling/warming therapies per order  - Administer medications as ordered  - Instruct and encourage patient and family to use good hand hygiene technique  - Identify and instruct in appropriate isolation precautions for identified infection/condition  Outcome: Progressing  Goal: Absence of fever/infection during neutropenic period  Description  INTERVENTIONS:  - Monitor WBC    Outcome: Progressing     Problem: SAFETY ADULT  Goal: Patient will remain free of falls  Description  INTERVENTIONS:  - Assess patient frequently for physical needs  -  Identify cognitive and physical deficits and behaviors that affect risk of falls    -  [place] fall precautions as indicated by assessment   - Educate patient/family on patient safety including physical limitations  - Instruct patient to call for assistance with activity based on assessment  - Modify environment to reduce risk of injury  - Consider OT/PT consult to assist with strengthening/mobility  Outcome: Progressing  Goal: Maintain or return to baseline ADL function  Description  INTERVENTIONS:  -  Assess patient's ability to carry out ADLs; assess patient's baseline for ADL function and identify physical deficits which impact ability to perform ADLs (bathing, care of mouth/teeth, toileting, grooming, dressing, etc )  - Assess/evaluate cause of self-care deficits   - Assess range of motion  - Assess patient's mobility; develop plan if impaired  - Assess patient's need for assistive devices and provide as appropriate  - Encourage maximum independence but intervene and supervise when necessary  - Involve family in performance of ADLs  - Assess for home care needs following discharge   - Consider OT consult to assist with ADL evaluation and planning for discharge  - Provide patient education as appropriate  Outcome: Progressing  Goal: Maintain or return mobility status to optimal level  Description  INTERVENTIONS:  - Assess patient's baseline mobility status (ambulation, transfers, stairs, etc )    - Identify cognitive and physical deficits and behaviors that affect mobility  - Identify mobility aids required to assist with transfers and/or ambulation (gait belt, sit-to-stand, lift, walker, cane, etc )  - [place] fall precautions as indicated by assessment  - Record patient progress and toleration of activity level on Mobility SBAR; progress patient to next Phase/Stage  - Instruct patient to call for assistance with activity based on assessment  - Consider rehabilitation consult to assist with strengthening/weightbearing, etc   Outcome: Progressing     Problem: DISCHARGE PLANNING  Goal: Discharge to home or other facility with appropriate resources  Description  INTERVENTIONS:  - Identify barriers to discharge w/patient and caregiver  - Arrange for needed discharge resources and transportation as appropriate  - Identify discharge learning needs (meds, wound care, etc )  - Arrange for interpretive services to assist at discharge as needed  - Refer to Case Management Department for coordinating discharge planning if the patient needs post-hospital services based on physician/advanced practitioner order or complex needs related to functional status, cognitive ability, or social support system  Outcome: Progressing

## 2019-08-14 NOTE — UTILIZATION REVIEW
Initial Clinical Review    Elective    IP     surgical procedure    Age/Sex: 20 y o  female     Surgery Date:    8/13/2019    Procedure: GASTRECTOMY LAPAROSCOPIC SLEEVE WITH ROBOTICS (N/A)       Anesthesia:    general    Operative Findings:    normal    Admission Orders: Date/Time/Statement: 8/13/19 @ 1737   Orders Placed This Encounter   Procedures    Inpatient Admission     Standing Status:   Standing     Number of Occurrences:   1     Order Specific Question:   Admitting Physician     Answer:   NAINA Najera [[id number]]     Order Specific Question:   Level of Care     Answer:   Med Surg [16]     Order Specific Question:   Bed Type     Answer:   Bariatric [1]     Order Specific Question:   Estimated length of stay     Answer:   Inpatient Only Surgery     Vital Signs: /94 (BP Location: Left arm)   Pulse 77   Temp (!) 97 1 °F (36 2 °C) (Temporal)   Resp 17   Ht 5' 3" (1 6 m)   Wt 109 kg (240 lb 4 8 oz)   LMP 07/25/2019 (Exact Date)   SpO2 97%   BMI 42 57 kg/m²        Diet:   Bariatric  Cl liq  D iet    Mobility:   As  william    DVT Prophylaxis:    SCD'S    Medications/Pain Control:   Current Facility-Administered Medications:  acetaminophen 975 mg Oral Q8H [address]   enoxaparin 40 mg Subcutaneous BID   famotidine 20 mg Oral BID   lactated ringers 75 mL/hr Intravenous Continuous   metoclopramide 10 mg Intravenous Q6H PRN   morphine injection 4 mg Intravenous Q2H PRN   morphine injection 2 mg Intravenous Q2H PRN   ondansetron 4 mg Intravenous Q6H   oxyCODONE 10 mg Oral Q4H PRN   oxyCODONE 5 mg Oral Q4H PRN   promethazine 25 mg Intramuscular Q6H PRN   scopolamine 1 patch Transdermal Once   sodium chloride 125 mL/hr Intravenous Continuous     tele  Network Utilization Review Department  Phone: [phone]; Fax [phone]  [email or web address]  ATTENTION: Please call with any questions or concerns to [phone]  and carefully listen to the prompts so that you are directed to the right person  Send all requests for admission clinical reviews, approved or denied determinations and any other requests to fax [phone]   All voicemails are confidential

## 2019-08-14 NOTE — PROGRESS NOTES
The famotidine has / have been converted to Oral per [facility] for Adults as approved by the Pharmacy and Therapeutics Committee  The patient met all eligible criteria:  3 Age = 25years old   2) Received at least one dose of the IV form   3) Receiving at least one other scheduled oral/enteral medication   4) Tolerating an oral/enteral diet   and did not have any exclusions:   1) Critical care patient   2) Active GI bleed (IF assessing H2RAs or PPIs)   3) Continuous tube feeding (IF assessing cipro, doxycycline, levofloxacin, minocycline, rifampin, or voriconazole)   4) Receiving PO vancomycin (IF assessing metronidazole)   5) Persistent nausea and/or vomiting   6) Ileus or gastrointestinal obstruction   7) [place]/nasogastric tube set for continuous suction   8) Specific order not to automatically convert to PO (in the order's comments or if discussed in the most recent Infectious Disease or primary team's progress notes)

## 2019-08-14 NOTE — DISCHARGE INSTR - AVS FIRST PAGE
your pain medications and Lovenox from [address] in [facility] [address]   Take Tylenol as instructed  DO NOT more than 4000 mg per day  Stay hydrated and follow your discharge diet progression   Mild nausea is ok as long as you can drink fluids  Take your omeprazole daily  Crush or cut your pills and open capsules, mix with liquid to drink    Follow up with Dr Brit Kemp and your PCP within the next week

## 2019-08-14 NOTE — PLAN OF CARE
Problem: INFECTION - ADULT  Goal: Absence or prevention of progression during hospitalization  Description  INTERVENTIONS:  - Assess and monitor for signs and symptoms of infection  - Monitor lab/diagnostic results  - Monitor all insertion sites, i e  indwelling lines, tubes, and drains  - Monitor endotracheal if appropriate and nasal secretions for changes in amount and color  - [place] appropriate cooling/warming therapies per order  - Administer medications as ordered  - Instruct and encourage patient and family to use good hand hygiene technique  - Identify and instruct in appropriate isolation precautions for identified infection/condition  Outcome: Progressing  Goal: Absence of fever/infection during neutropenic period  Description  INTERVENTIONS:  - Monitor WBC    Outcome: Progressing     Problem: SAFETY ADULT  Goal: Patient will remain free of falls  Description  INTERVENTIONS:  - Assess patient frequently for physical needs  -  Identify cognitive and physical deficits and behaviors that affect risk of falls    -  [place] fall precautions as indicated by assessment   - Educate patient/family on patient safety including physical limitations  - Instruct patient to call for assistance with activity based on assessment  - Modify environment to reduce risk of injury  - Consider OT/PT consult to assist with strengthening/mobility  Outcome: Progressing  Goal: Maintain or return to baseline ADL function  Description  INTERVENTIONS:  -  Assess patient's ability to carry out ADLs; assess patient's baseline for ADL function and identify physical deficits which impact ability to perform ADLs (bathing, care of mouth/teeth, toileting, grooming, dressing, etc )  - Assess/evaluate cause of self-care deficits   - Assess range of motion  - Assess patient's mobility; develop plan if impaired  - Assess patient's need for assistive devices and provide as appropriate  - Encourage maximum independence but intervene and supervise when necessary  - Involve family in performance of ADLs  - Assess for home care needs following discharge   - Consider OT consult to assist with ADL evaluation and planning for discharge  - Provide patient education as appropriate  Outcome: Progressing  Goal: Maintain or return mobility status to optimal level  Description  INTERVENTIONS:  - Assess patient's baseline mobility status (ambulation, transfers, stairs, etc )    - Identify cognitive and physical deficits and behaviors that affect mobility  - Identify mobility aids required to assist with transfers and/or ambulation (gait belt, sit-to-stand, lift, walker, cane, etc )  - [place] fall precautions as indicated by assessment  - Record patient progress and toleration of activity level on Mobility SBAR; progress patient to next Phase/Stage  - Instruct patient to call for assistance with activity based on assessment  - Consider rehabilitation consult to assist with strengthening/weightbearing, etc   Outcome: Progressing     Problem: DISCHARGE PLANNING  Goal: Discharge to home or other facility with appropriate resources  Description  INTERVENTIONS:  - Identify barriers to discharge w/patient and caregiver  - Arrange for needed discharge resources and transportation as appropriate  - Identify discharge learning needs (meds, wound care, etc )  - Arrange for interpretive services to assist at discharge as needed  - Refer to Case Management Department for coordinating discharge planning if the patient needs post-hospital services based on physician/advanced practitioner order or complex needs related to functional status, cognitive ability, or social support system  Outcome: Progressing

## 2019-08-14 NOTE — DISCHARGE SUMMARY
Discharge Summary - Kishor Walsh 20 y o  female MRN: [id number]    Unit/Bed#: E5 -01 Encounter: [id number]      Pre-Operative Diagnosis: Pre-Op Diagnosis Codes:     * Morbid obesity ([address] ) [E66 01]     * Polycystic ovarian disease [E28 2]    Post-Operative Diagnosis: Post-Op Diagnosis Codes:     * Morbid obesity ([address] ) [E66 01]     * Polycystic ovarian disease [E28 2]    Procedures Performed:  Procedure(s):  GASTRECTOMY LAPAROSCOPIC SLEEVE WITH ROBOTICS    Surgeon: Alfredo Pike MD    See H & P for full details of admission and Operative Note for full details of operations performed  Patient tolerated surgery well without complications  In the morning postoperative Day 1, the patient had mild nausea and abdominal pain  Tolerated a clear liquid diet without vomiting  Able to ambulate and voiding independently  Patient was deemed ready for discharge home  Patient was seen and examined prior to discharge  Provisions for Follow-Up Care:  See After Visit Summary/Discharge Instructions for information related to follow-up care and home orders  Disposition: Home, in stable condition  Planned Readmission: No    Discharge Medications:  See After Visit Summary/Discharge Instructions for reconciled discharge medications provided to patient and family  Post Operative instructions: Reviewed with patient and/or family      Signature:   Alex Candelario PA-C  Date: 8/14/2019 Time: 8:29 AM

## 2019-08-19 ENCOUNTER — OFFICE VISIT (OUTPATIENT)
Dept: FAMILY MEDICINE CLINIC | Facility: CLINIC | Age: 24
End: 2019-08-19
Payer: COMMERCIAL

## 2019-08-19 ENCOUNTER — TELEPHONE (OUTPATIENT)
Dept: FAMILY MEDICINE CLINIC | Facility: CLINIC | Age: 24
End: 2019-08-19

## 2019-08-19 ENCOUNTER — TRANSITIONAL CARE MANAGEMENT (OUTPATIENT)
Dept: FAMILY MEDICINE CLINIC | Facility: CLINIC | Age: 24
End: 2019-08-19

## 2019-08-19 VITALS
DIASTOLIC BLOOD PRESSURE: 80 MMHG | HEART RATE: 109 BPM | BODY MASS INDEX: 40.33 KG/M2 | SYSTOLIC BLOOD PRESSURE: 102 MMHG | OXYGEN SATURATION: 99 % | HEIGHT: 63 IN | WEIGHT: 227.6 LBS | TEMPERATURE: 98.8 F | RESPIRATION RATE: 16 BRPM

## 2019-08-19 DIAGNOSIS — Z09 HOSPITAL DISCHARGE FOLLOW-UP: Primary | ICD-10-CM

## 2019-08-19 DIAGNOSIS — E66.01 MORBID OBESITY (HCC): ICD-10-CM

## 2019-08-19 PROCEDURE — 99495 TRANSJ CARE MGMT MOD F2F 14D: CPT | Performed by: FAMILY MEDICINE

## 2019-08-19 PROCEDURE — 1111F DSCHRG MED/CURRENT MED MERGE: CPT | Performed by: FAMILY MEDICINE

## 2019-08-19 NOTE — PROGRESS NOTES
Assessment/Plan:    Problem List Items Addressed This Visit        Other    Morbid obesity ([address] )     S/p gastric sleeve  Overall doing well         Hospital discharge follow-up - Primary     Stable and improving               BMI Counseling: Body mass index is 39 83 kg/m²  Discussed the patient's BMI with her  The BMI is above average  BMI counseling and education was provided to the patient  Nutrition recommendations include reducing portion sizes and 3-5 servings of fruits/vegetables daily  Exercise recommendations include exercising 3-5 times per week  There are no Patient Instructions on file for this visit  No follow-ups on file  Subjective:      Patient ID: Jaxon Current is a 20 y o  female  Chief Complaint   Patient presents with    Transition of Care Management     Patient here for TCM discharged on 08/14/19 [place] gastric sleeve        Here for follow up of sleeve 6 days ago  Now on pureed diet  Has lost 13 pounds since surgery  No complication with surgery  Feeling great  Still on lovenox  On omeprazole for 3 months      The following portions of the patient's history were reviewed and updated as appropriate: allergies, current medications, past family history, past medical history, past social history, past surgical history and problem list     Review of Systems   Constitutional: Negative  HENT: Negative  Eyes: Negative  Respiratory: Negative  Cardiovascular: Negative  Gastrointestinal: Positive for abdominal pain (due to incision)  Endocrine: Negative  Genitourinary: Negative  Musculoskeletal: Negative  Skin: Negative  Allergic/Immunologic: Negative  Neurological: Negative  Hematological: Negative  Psychiatric/Behavioral: Negative            Current Outpatient Medications   Medication Sig Dispense Refill    clindamycin-benzoyl peroxide (BENZACLIN) gel APPLY TO FACE ONCE TO TWICE DAILY AS NEEDED FOR SPOT TX FOR FLARES  1    enoxaparin (LOVENOX) 40 mg/0 4 mL Inject 0 4 mL (40 mg total) under the skin daily for 13 days 5 2 mL 0    omeprazole (PriLOSEC) 20 mg delayed release capsule Take 1 capsule (20 mg total) by mouth daily for 90 days 90 capsule 1    tretinoin (RETIN-A) 0 025 % cream APPLY A PEA SIZED AMOUNT TO ENTIRE FACE EVERY NIGHT AT BEDTIME  1    triamcinolone (KENALOG) 0 1 % ointment 1 APPLICATION APPLY ON THE SKIN TWICE A DAY APPLY TO AFFECTED AREA TWICE A DAY  0    oxyCODONE (ROXICODONE) 5 mg immediate release tablet Take 1 tablet (5 mg total) by mouth every 6 (six) hours as needed for moderate painMax Daily Amount: 20 mg (Patient not taking: Reported on 8/19/2019) 15 tablet 0     No current facility-administered medications for this visit  TCM Call (since 7/19/2019)     Date and time call was made  8/15/2019 12:43 PM      TCM Call (since 7/19/2019)     I have advised the patient to call PCP with any new or worsening symptoms  jasen moody ma          Objective:    /80   Pulse (!) 109   Temp 98 8 °F (37 1 °C)   Resp 16   Ht 5' 3 39" (1 61 m)   Wt 103 kg (227 lb 9 6 oz)   LMP 07/25/2019 (Exact Date)   SpO2 99%   BMI 39 83 kg/m²        Physical Exam   Constitutional: She appears well-developed and well-nourished  HENT:   Head: Normocephalic and atraumatic  Eyes: Pupils are equal, round, and reactive to light  Neck: Normal range of motion  Neck supple  Cardiovascular: Normal rate, regular rhythm, normal heart sounds and intact distal pulses  Pulmonary/Chest: Effort normal and breath sounds normal    Abdominal: There is tenderness (incision healing well)  Musculoskeletal: Normal range of motion  Neurological: She is alert  Skin: Skin is warm  Capillary refill takes less than 2 seconds  Psychiatric: She has a normal mood and affect  Her behavior is normal  Judgment and thought content normal    Nursing note and vitals reviewed               Toni Lung, DO

## 2019-08-22 ENCOUNTER — OFFICE VISIT (OUTPATIENT)
Dept: BARIATRICS | Facility: CLINIC | Age: 24
End: 2019-08-22

## 2019-08-22 VITALS
BODY MASS INDEX: 39.6 KG/M2 | SYSTOLIC BLOOD PRESSURE: 120 MMHG | DIASTOLIC BLOOD PRESSURE: 70 MMHG | HEART RATE: 94 BPM | TEMPERATURE: 98.8 F | HEIGHT: 63 IN | WEIGHT: 223.5 LBS

## 2019-08-22 DIAGNOSIS — E66.01 MORBID (SEVERE) OBESITY DUE TO EXCESS CALORIES (HCC): ICD-10-CM

## 2019-08-22 DIAGNOSIS — Z98.84 BARIATRIC SURGERY STATUS: Primary | ICD-10-CM

## 2019-08-22 DIAGNOSIS — Z98.84 S/P LAPAROSCOPIC SLEEVE GASTRECTOMY: Primary | ICD-10-CM

## 2019-08-22 PROCEDURE — 99024 POSTOP FOLLOW-UP VISIT: CPT | Performed by: SURGERY

## 2019-08-22 PROCEDURE — RECHECK: Performed by: DIETITIAN, REGISTERED

## 2019-08-22 NOTE — PROGRESS NOTES
Weight Management Nutrition Class     Diagnosis: Morbid Obesity    Bariatric Surgeon: Dr Elida Lawson    Surgery: Laparoscopic sleeve surgery     Class: first post op note    Topics discussed today include:     fluid goals post op, protein goals post op, constipation, chew food well, exercise, diet progression, protein supplems, vitamin/mineral supplements, calcium supplements, additional vitamin B12, iron supplements, fat soluble vitamins  and provided with sample of premier protein water     Patient was able to verbalize basic diet (protein, fluid, vitamin and mineral) recommendations and possible nutrition-related complications   Yes

## 2019-08-22 NOTE — PROGRESS NOTES
Assessment/Plan:     Patient ID: Luis Nevarez is a 20 y o  female  Diagnoses and all orders for this visit:    Bariatric surgery status    Morbid (severe) obesity due to excess calories ([facility] [address] )          - Status post Vertical Sleeve Gastrectomy with Dr Carlitos Prajapati on 08/13/2019 08/13/2019  Patient is presenting for the first postoperative visit  Patient's hospital stay was uneventful without any complications  Patient is doing well, has no complaints, and is taking vitamins and omeprazole as instructed  Currently tolerating the blenderized diet, will advance to soft diet  Patient will also be meeting with our dietician today to review her vitamin and mineral supplements and also go over her diet and emphasize postoperative commitment and compliance  The patient was also instructed to start exercising on a regular basis  However, we recommend no heavy lifting, or weight exercises for another 2 weeks  F/U in 4 weeks  - Follow-up in 1 month  We kindly ask that your arrive 15 minutes before your scheduled appointment time with your provider to allow our staff to room you, get your vital signs and update your chart  - Get lab work done prior to next visit  Please call the office if you need a script  - Call our office if you have any problems with abdominal pain especially associated with fever, chills, nausea, vomiting or any other concerns  - All  Post-bariatric surgery patients should be aware that very small quantities of any alcohol can cause impairment and it is very possible not to feel the effect  The effect can be in the system for several hours  It is also a stomach irritant  - It is advised to AVOID alcohol, Nonsteroidal antiinflammatory drugs (NSAIDS) and nicotine of all forms   Any of these can cause stomach irritation/pain  Keep up the good work! Subjective:      Patient ID: Luis Nevarez is a 20 y o  female      -s/p Vertical Sleeve Gastrectomy with Dr Carlitos Prajapati on 08/13/2019    Presents to the office today for first post operative visit  Tolerating diet without issues; denies N/V, dysphagia, reflux  Overall doing Well  Current BMI is Body mass index is 39 59 kg/m²  The following portions of the patient's history were reviewed and updated as appropriate: allergies, current medications, past family history, past medical history, past social history, past surgical history and problem list     Review of Systems   Constitutional: Negative  HENT: Negative  Eyes: Negative  Respiratory: Negative  Cardiovascular: Negative  Gastrointestinal: Negative  Endocrine: Negative  Genitourinary: Negative  Musculoskeletal: Negative  Skin: Negative  Allergic/Immunologic: Negative  Neurological: Negative  Hematological: Negative  Psychiatric/Behavioral: Negative  All other systems reviewed and are negative  Objective:    /70 (BP Location: Right arm, Patient Position: Sitting)   Pulse 94   Temp 98 8 °F (37 1 °C) (Tympanic)   Ht 5' 3" (1 6 m)   Wt 101 kg (223 lb 8 oz)   LMP 07/25/2019 (Exact Date)   BMI 39 59 kg/m²      Physical Exam   Constitutional: She is oriented to person, place, and time  She appears well-developed and well-nourished  Eyes: Pupils are equal, round, and reactive to light  Conjunctivae and EOM are normal    Neck: Normal range of motion  Neck supple  Cardiovascular: Normal rate, regular rhythm and normal heart sounds  Pulmonary/Chest: Effort normal and breath sounds normal    Abdominal:   Soft, nondistended, appropriately tender, incisions healing well  Musculoskeletal: Normal range of motion  Neurological: She is alert and oriented to person, place, and time  Skin: Skin is warm and dry  Psychiatric: She has a normal mood and affect   Her behavior is normal  Judgment and thought content normal

## 2019-09-10 ENCOUNTER — OFFICE VISIT (OUTPATIENT)
Dept: BARIATRICS | Facility: CLINIC | Age: 24
End: 2019-09-10

## 2019-09-10 VITALS
TEMPERATURE: 98.5 F | HEART RATE: 80 BPM | SYSTOLIC BLOOD PRESSURE: 118 MMHG | WEIGHT: 220 LBS | HEIGHT: 63 IN | BODY MASS INDEX: 38.98 KG/M2 | DIASTOLIC BLOOD PRESSURE: 72 MMHG

## 2019-09-10 DIAGNOSIS — T81.89XA: ICD-10-CM

## 2019-09-10 DIAGNOSIS — Z98.84 BARIATRIC SURGERY STATUS: Primary | ICD-10-CM

## 2019-09-10 PROCEDURE — 99024 POSTOP FOLLOW-UP VISIT: CPT | Performed by: PHYSICIAN ASSISTANT

## 2019-09-10 NOTE — PATIENT INSTRUCTIONS
Keep incision sites clean and dry  Keep covered with guaze until incision site is totally closed  If pants are rubbing on the area-continue to keep covered with guaze  If area becomes beefy red, swollen, with increased painfulness, fever-temperature of 101 or greater, let us know  Continue on diet per binder and bariatric supplements as advised  Call with any concerns between visits especially if you develop fever,chills, nausea/vomiting

## 2019-09-10 NOTE — PROGRESS NOTES
Assessment/Plan:    Bariatric surgery status  -s/p Vertical Sleeve Gastrectomy with Dr Sosa March on 8/13/2019  Overall doing Well  She is here to have her right upper incision site checked    :Current weight: 220 lb    Current BMI is Body mass index is 38 97 kg/m²  Tolerating a soft diet-yes      Incisional irritation  She is here to have her right upper incision site checked  Reports around one week ago the glue came off the site and was having some oozing-no pus  She indicates her pants are rubbing on the area and area is more irritated than her other incision sites  No fever,no chills, no nausea or vomiting  On exam the area was probed  On lateral edge and no signs of infection-no overt drainage today  Area was covered with guaze and paper tape    Reassured patient no active signs of infection  She was advised to keep the area clean and dry and covered with guaze until incision is completely closed  The other incisions appear closed/appropriately healing    She was advised on signs of possible infection and when to seek further help  She verbalized understanding  She will not make a formal follow-up for this but can call to be seen as needed  Otherwise routine follow-up in 3 months is planned  Diagnoses and all orders for this visit:    Bariatric surgery status    Incisional irritation          Subjective:      Patient ID: Ira Philippe is a 20 y o  female  She is here to have right upper incision site checked  Concerned it may be infected  No swelling, no fever, no chills, no nausea/vomiting associated  She is tolerating her soft diet      The following portions of the patient's history were reviewed and updated as appropriate: allergies, current medications, past family history, past medical history, past social history, past surgical history and problem list     Review of Systems   Constitutional: Negative for chills and fever  Unexpected weight change: planned weight loss  Gastrointestinal: Negative for abdominal pain, nausea and vomiting  Objective:      /72 (BP Location: Left arm, Patient Position: Sitting)   Pulse 80   Temp 98 5 °F (36 9 °C) (Tympanic)   Ht 5' 3" (1 6 m)   Wt 99 8 kg (220 lb)   BMI 38 97 kg/m²          Physical Exam   Constitutional: She is oriented to person, place, and time  She appears well-developed and well-nourished  HENT:   Mouth/Throat: Oropharynx is clear and moist    Eyes: Conjunctivae are normal  No scleral icterus  Cardiovascular: Normal rate, regular rhythm and normal heart sounds  Pulmonary/Chest: Effort normal and breath sounds normal    Abdominal: Soft  There is no tenderness  No incisional hernias appreciated  Right upper incision site slightly opened on lateral edge-was probed with no active oozing, no pus,  No swelling, no overt tenderness, no teo erythema, no drainage, no induration-no signs of infection   Musculoskeletal:   Normal gait   Neurological: She is alert and oriented to person, place, and time  Psychiatric: She has a normal mood and affect  Her behavior is normal  Judgment and thought content normal    Nursing note and vitals reviewed  GOALS: Continued weight loss with good nutrition intakes    Normal vitamin and mineral levels  Exercise as tolerated  Healing of incision sites  BARRIERS: none identified

## 2019-09-10 NOTE — ASSESSMENT & PLAN NOTE
She is here to have her right upper incision site checked  Reports around one week ago the glue came off the site and was having some oozing-no pus  She indicates her pants are rubbing on the area and area is more irritated than her other incision sites  No fever,no chills, no nausea or vomiting  On exam the area was probed  On lateral edge and no signs of infection-no overt drainage today  Area was covered with guaze and paper tape    Reassured patient no active signs of infection  She was advised to keep the area clean and dry and covered with guaze until incision is completely closed  The other incisions appear closed/appropriately healing    She was advised on signs of possible infection and when to seek further help  She verbalized understanding  She will not make a formal follow-up for this but can call to be seen as needed  Otherwise routine follow-up in 3 months is planned

## 2019-09-10 NOTE — ASSESSMENT & PLAN NOTE
-s/p Vertical Sleeve Gastrectomy with Dr Inocencio Bellamy on 8/13/2019  Overall doing Well  She is here to have her right upper incision site checked    :Current weight: 220 lb    Current BMI is Body mass index is 38 97 kg/m²      Tolerating a soft diet-yes

## 2019-09-17 ENCOUNTER — ANNUAL EXAM (OUTPATIENT)
Dept: OBGYN CLINIC | Facility: CLINIC | Age: 24
End: 2019-09-17
Payer: COMMERCIAL

## 2019-09-17 VITALS
SYSTOLIC BLOOD PRESSURE: 118 MMHG | DIASTOLIC BLOOD PRESSURE: 80 MMHG | BODY MASS INDEX: 38.8 KG/M2 | WEIGHT: 219 LBS | HEIGHT: 63 IN

## 2019-09-17 DIAGNOSIS — Z30.41 SURVEILLANCE FOR BIRTH CONTROL, ORAL CONTRACEPTIVES: ICD-10-CM

## 2019-09-17 DIAGNOSIS — Z01.419 ENCOUNTER FOR GYNECOLOGICAL EXAMINATION (GENERAL) (ROUTINE) WITHOUT ABNORMAL FINDINGS: Primary | ICD-10-CM

## 2019-09-17 DIAGNOSIS — Z11.3 SCREENING FOR STD (SEXUALLY TRANSMITTED DISEASE): ICD-10-CM

## 2019-09-17 PROCEDURE — 99395 PREV VISIT EST AGE 18-39: CPT | Performed by: PHYSICIAN ASSISTANT

## 2019-09-17 RX ORDER — LEVONORGESTREL AND ETHINYL ESTRADIOL 0.1-0.02MG
1 KIT ORAL DAILY
COMMUNITY
End: 2019-09-17 | Stop reason: SDUPTHER

## 2019-09-17 RX ORDER — LEVONORGESTREL AND ETHINYL ESTRADIOL 0.1-0.02MG
1 KIT ORAL DAILY
Qty: 84 TABLET | Refills: 3 | Status: SHIPPED | OUTPATIENT
Start: 2019-09-17 | End: 2020-04-29

## 2019-09-17 NOTE — ASSESSMENT & PLAN NOTE
The current ASCCP guidelines were reviewed  Patient's last pap was 1/31/19 - HSIL (+) HRHPV type 16/18 negative and therefore, a pap with HPV cotesting is indicated at this time  I emphasized the importance of an annual pelvic and breast exam  Patient ok to have a pap done today    Patient has a follow-up appointment with gyn onc next week with her history of SALTY 2-3

## 2019-09-17 NOTE — PROGRESS NOTES
Assessment/Plan   Diagnoses and all orders for this visit:    Encounter for gynecological examination (general) (routine) without abnormal findings  -     GP PAP + HPV PLUS + CT + GC  The current ASCCP guidelines were reviewed  Patient's last pap was 1/31/19 - HSIL (+) HRHPV type 16/18 negative and therefore, a pap with HPV cotesting is indicated at this time  I emphasized the importance of an annual pelvic and breast exam  Patient ok to have a pap done today  Patient has a follow-up appointment with gyn onc next week with her history of SALTY 2-3    Screening for STD (sexually transmitted disease)  -     GP PAP + HPV PLUS + CT + GC  Encourage safe sexual practices; STD testing - cultures collected and STD lab work deferred    Surveillance for birth control, oral contraceptives  -     levonorgestrel-ethinyl estradiol ([facility]) 0 1-20 MG-MCG per tablet; Take 1 tablet by mouth daily  Contraception - continue Lessina 1 tab po daily    Discussion  I have discussed the importance of monthly self-breast exams, exercise and healthy diet as well as adequate intake of calcium and vitamin D  Encourage MVI q day and r/kaiser importance of folic acid; Encourage 30-40 min weight bearing exercise most days of week  The patient has had the Gardasil vaccine series, which is recommended for patients from 545 years of age  All questions have been answered to her satisfaction  RTO for APE or sooner if needed    Subjective     HPI   Rob Mackey is a 20 y o  female who presents for annual well woman exam  Patient status post sleeve gastrectomy x 1 month - doing well after surgery  Menarche - 11/12; LMP - 8/24/19; Periods are reg q month and last 3-5 days; No excessive bleeding; No intermenstrual bleeding or spotting; Cramps are tolerable on OCP - has been intolerable the past couple of months since had to d/c OCP due to surgery - just restarted and hoping will get better again  No vulvar itch/burn;  No vaginal itch/burn; No abn discharge or odor; No urinary sx - burning/pain/frequency/hematuria  (+) SBEs - no breast masses, asymmetry, nipple discharge or bleeding, changes in skin of breast, or breast tenderness bilaterally  No abd/pelvic pain or HAs;   Pt is sexually active in a mutually monog sexual relationship x 1 month; No issues with intercourse; She requests std cultures; declines hiv/hep testing; Feels safe at home  Current contraception: OCP  Condom use: yes  Gardasil - per patient received all 3 shots  (+) PCP for routine Bw/care; Last Pap - 7/2018 - ASCUS cannot rule out high grade; 8/2018 - colpo - biopsy SALTY 2 and ECC negative; 1/31/19 - HSIL (+) HRHPV type 16/18 negative; 4/19/19 - colpo with Dr Apple Fountain at gyn onc SALTY 2-3 high grade - plan colpo q 6 months  History of abnormal Pap smear: yes    Review of Systems   Constitutional: Negative for activity change, fatigue, fever and unexpected weight change  HENT: Negative for congestion, dental problem, sinus pressure and sinus pain  Eyes: Negative for visual disturbance  Respiratory: Negative for cough, shortness of breath and wheezing  Cardiovascular: Negative for chest pain and leg swelling  Gastrointestinal: Positive for constipation  Negative for abdominal distention, abdominal pain, blood in stool, diarrhea, nausea and vomiting  Endocrine: Negative for polydipsia  Genitourinary: Negative for difficulty urinating, dyspareunia, dysuria, frequency, hematuria, menstrual problem, pelvic pain, urgency, vaginal bleeding, vaginal discharge and vaginal pain  Musculoskeletal: Negative for arthralgias and back pain  Allergic/Immunologic: Negative for environmental allergies  Neurological: Negative for dizziness, seizures and headaches  Psychiatric/Behavioral: Negative for dysphoric mood and sleep disturbance  The patient is not nervous/anxious          The following portions of the patient's history were reviewed and updated as appropriate: allergies, current medications, past family history, past medical history, past social history, past surgical history and problem list          OB History        0    Para   0    Term   0       0    AB   0    Living   0       SAB   0    TAB   0    Ectopic   0    Multiple   0    Live Births   0                 Past Medical History:   Diagnosis Date    Abnormal Pap smear of cervix      - HSIL/CIN2-3    Acne     Asthma     exercise induced asthma/no recent problems    Bariatric surgery status     Eczema     Exercises 1 to 2 times per week     occas 3x/week    External hemorrhoids     last assessed 8/15/12; resolved 14    Fatigue     last assessed 13; resolved 14    HPV (human papilloma virus) infection      (+) HRHPV type 16/18 negative    Obesity     PCOS (polycystic ovarian syndrome)     Postsurgical malabsorption        Past Surgical History:   Procedure Laterality Date    EGD      DC LAP, DEANDRE RESTRICT PROC, LONGITUDINAL GASTRECTOMY N/A 2019    Procedure: GASTRECTOMY LAPAROSCOPIC SLEEVE WITH ROBOTICS;  Surgeon: Hilda Rdz MD;  Location: [facility];  Service: Bariatrics    DC REDUCTION OF LARGE BREAST Bilateral 2018    Procedure: BREAST REDUCTION;  Surgeon: Rene Serra MD;  [facility];  Service: Plastics    REDUCTION MAMMAPLASTY      WISDOM TOOTH EXTRACTION         Family History   Problem Relation Age of Onset    Clotting disorder Mother         with menstration    Diabetes Father     Breast cancer Maternal Aunt     Cancer Maternal Aunt         breast cancer    Hypertension Neg Hx     Heart disease Neg Hx     Thyroid disease Neg Hx     Stroke Neg Hx        Social History     Socioeconomic History    Marital status: Single     Spouse name: Not on file    Number of children: Not on file    Years of education: Not on file    Highest education level: Not on file   Occupational History    Not on file   Social Needs    Financial resource strain: Not on file    Food insecurity:     Worry: Not on file     Inability: Not on file    Transportation needs:     Medical: Not on file     Non-medical: Not on file   Tobacco Use    Smoking status: Never Smoker    Smokeless tobacco: Never Used   Substance and Sexual Activity    Alcohol use: Yes     Comment: social    Drug use: No    Sexual activity: Yes     Partners: Male     Birth control/protection: OCP   Lifestyle    Physical activity:     Days per week: Not on file     Minutes per session: Not on file    Stress: Not on file   Relationships    Social connections:     Talks on phone: Not on file     Gets together: Not on file     Attends Mandaen service: Not on file     Active member of club or organization: Not on file     Attends meetings of clubs or organizations: Not on file     Relationship status: Not on file    Intimate partner violence:     Fear of current or ex partner: Not on file     Emotionally abused: Not on file     Physically abused: Not on file     Forced sexual activity: Not on file   Other Topics Concern    Not on file   Social History Narrative    Not on file         Current Outpatient Medications:     levonorgestrel-ethinyl estradiol (AVIANE,ALESSE,LESSINA) 0 1-20 MG-MCG per tablet, Take 1 tablet by mouth daily, Disp: 84 tablet, Rfl: 3    omeprazole (PriLOSEC) 20 mg delayed release capsule, Take 1 capsule (20 mg total) by mouth daily for 90 days, Disp: 90 capsule, Rfl: 1    clindamycin-benzoyl peroxide (BENZACLIN) gel, APPLY TO FACE ONCE TO TWICE DAILY AS NEEDED FOR SPOT TX FOR FLARES, Disp: , Rfl: 1    tretinoin (RETIN-A) 0 025 % cream, APPLY A PEA SIZED AMOUNT TO ENTIRE FACE EVERY NIGHT AT BEDTIME, Disp: , Rfl: 1    triamcinolone (KENALOG) 0 1 % ointment, 1 APPLICATION APPLY ON THE SKIN TWICE A DAY APPLY TO AFFECTED AREA TWICE A DAY, Disp: , Rfl: 0    No Known Allergies    Objective   Vitals:    09/17/19 0806   BP: 118/80   BP Location: Left arm Patient Position: Sitting   Cuff Size: Large   Weight: 99 3 kg (219 lb)   Height: 5' 3" (1 6 m)     Physical Exam   Constitutional: She appears well-developed and well-nourished  No distress  Neck: No thyromegaly present  Cardiovascular: Normal rate, regular rhythm and normal heart sounds  No murmur heard  Pulmonary/Chest: Effort normal and breath sounds normal  No respiratory distress  She has no wheezes  Right breast exhibits no inverted nipple, no mass, no nipple discharge, no skin change and no tenderness  Left breast exhibits no inverted nipple, no mass, no nipple discharge, no skin change and no tenderness  Breasts are symmetrical    Abdominal: Soft  She exhibits no distension and no mass  There is no tenderness  Genitourinary: Vagina normal and uterus normal  Pelvic exam was performed with patient supine  There is no rash, tenderness or lesion on the right labia  There is no rash, tenderness or lesion on the left labia  Uterus is not deviated, not enlarged, not fixed and not tender  Cervix exhibits no motion tenderness, no discharge and no friability  Right adnexum displays no mass, no tenderness and no fullness  Left adnexum displays no mass, no tenderness and no fullness  No erythema, tenderness or bleeding in the vagina  No foreign body in the vagina  No vaginal discharge found  Musculoskeletal: She exhibits no edema  Lymphadenopathy:     She has no cervical adenopathy  She has no axillary adenopathy  Right: No inguinal adenopathy present  Left: No inguinal adenopathy present  Neurological: She is alert  Skin: Skin is warm  She is not diaphoretic  Psychiatric: She has a normal mood and affect  Her behavior is normal    Vitals reviewed

## 2019-09-19 NOTE — PROGRESS NOTES
Weight Management Nutrition Class     Diagnosis: Obesity    Bariatric Surgeon: Dr Judith Gibbons    Surgery: Vertical Sleeve Gastrectomy    Class: 5 week post op     Topics discussed today include:     fluid goals post op, protein goals post op, constipation, chew food well, exercise, avoidance of alcohol, PPI use, diet progression, protein supplems, vitamin/mineral supplements and calcium supplements    Patient was able to verbalize basic diet (protein, fluid, vitamin and mineral) recommendations and possible nutrition-related complications   Yes

## 2019-09-22 LAB
DEPRECATED C TRACH RRNA XXX QL PRB: NOT DETECTED
HPV HR 12 DNA CVX QL NAA+PROBE: NOT DETECTED
HPV16 DNA SPEC QL NAA+PROBE: NOT DETECTED
HPV18 DNA SPEC QL NAA+PROBE: NOT DETECTED
N GONORRHOEA DNA UR QL NAA+PROBE: NOT DETECTED
THIN PREP CVX: NORMAL

## 2019-09-24 ENCOUNTER — CLINICAL SUPPORT (OUTPATIENT)
Dept: BARIATRICS | Facility: CLINIC | Age: 24
End: 2019-09-24

## 2019-09-24 DIAGNOSIS — Z98.84 BARIATRIC SURGERY STATUS: Primary | ICD-10-CM

## 2019-09-24 PROCEDURE — RECHECK: Performed by: DIETITIAN, REGISTERED

## 2019-10-22 ENCOUNTER — OFFICE VISIT (OUTPATIENT)
Dept: FAMILY MEDICINE CLINIC | Facility: CLINIC | Age: 24
End: 2019-10-22
Payer: COMMERCIAL

## 2019-10-22 VITALS
HEART RATE: 72 BPM | HEIGHT: 63 IN | WEIGHT: 205 LBS | TEMPERATURE: 97.8 F | BODY MASS INDEX: 36.32 KG/M2 | SYSTOLIC BLOOD PRESSURE: 124 MMHG | OXYGEN SATURATION: 97 % | DIASTOLIC BLOOD PRESSURE: 80 MMHG | RESPIRATION RATE: 13 BRPM

## 2019-10-22 DIAGNOSIS — T81.89XS INCISIONAL IRRITATION, SEQUELA: ICD-10-CM

## 2019-10-22 DIAGNOSIS — Z11.1 SCREENING FOR TUBERCULOSIS: ICD-10-CM

## 2019-10-22 DIAGNOSIS — E28.2 POLYCYSTIC OVARIAN DISEASE: ICD-10-CM

## 2019-10-22 DIAGNOSIS — Z23 NEED FOR VACCINATION: ICD-10-CM

## 2019-10-22 DIAGNOSIS — L70.0 ACNE VULGARIS: ICD-10-CM

## 2019-10-22 PROBLEM — M54.6 CHRONIC THORACIC BACK PAIN: Status: RESOLVED | Noted: 2017-11-09 | Resolved: 2019-10-22

## 2019-10-22 PROBLEM — G89.29 CHRONIC THORACIC BACK PAIN: Status: RESOLVED | Noted: 2017-11-09 | Resolved: 2019-10-22

## 2019-10-22 PROBLEM — Z09 HOSPITAL DISCHARGE FOLLOW-UP: Status: RESOLVED | Noted: 2019-08-19 | Resolved: 2019-10-22

## 2019-10-22 PROCEDURE — 86580 TB INTRADERMAL TEST: CPT

## 2019-10-22 PROCEDURE — 90471 IMMUNIZATION ADMIN: CPT

## 2019-10-22 PROCEDURE — 90686 IIV4 VACC NO PRSV 0.5 ML IM: CPT

## 2019-10-22 PROCEDURE — 99213 OFFICE O/P EST LOW 20 MIN: CPT

## 2019-10-22 NOTE — PROGRESS NOTES
Assessment/Plan:    1  BMI 36 0-36 9,adult  Assessment & Plan:  Seeing bariatric  Not exercising yet      2  Incisional irritation, sequela  Assessment & Plan:  Still healing      3  Polycystic ovarian disease  Assessment & Plan:  Cont ocp   Seeing gyn      4  Acne vulgaris  Assessment & Plan: On retin A, and benzaclin      5  Need for vaccination  -     influenza vaccine, [id number], quadrivalent, 0 5 mL, preservative-free, for adult and pediatric patients 6 mos+ (AFLURIA, FLUARIX, [address], [facility])    6  Screening for tuberculosis  -     TB Skin Test        BMI Counseling: Body mass index is 36 31 kg/m²  Discussed the patient's BMI with her  The BMI is above normal  Nutrition recommendations include reducing portion sizes and 3-5 servings of fruits/vegetables daily  Exercise recommendations include exercising 3-5 times per week  There are no Patient Instructions on file for this visit  No follow-ups on file  Subjective:      Patient ID: Charles Patrick is a 20 y o  female  Chief Complaint   Patient presents with    Follow-up     Patient here for 2 month follow up on Obesity        Here for follow up  Busy with school  Continuing to lose weight  Walking more      The following portions of the patient's history were reviewed and updated as appropriate: allergies, current medications, past family history, past medical history, past social history, past surgical history and problem list     Review of Systems   Constitutional: Negative  HENT: Negative  Eyes: Negative  Respiratory: Negative  Cardiovascular: Negative  Gastrointestinal: Negative  Endocrine: Negative  Genitourinary: Negative  Musculoskeletal: Negative  Skin: Negative  Allergic/Immunologic: Negative  Neurological: Negative  Hematological: Negative  Psychiatric/Behavioral: Negative            Current Outpatient Medications   Medication Sig Dispense Refill    clindamycin-benzoyl peroxide (BENZACLIN) gel APPLY TO FACE ONCE TO TWICE DAILY AS NEEDED FOR SPOT TX FOR FLARES  1    levonorgestrel-ethinyl estradiol (AVIANE,ALESSE,LESSINA) 0 1-20 MG-MCG per tablet Take 1 tablet by mouth daily 84 tablet 3    tretinoin (RETIN-A) 0 025 % cream APPLY A PEA SIZED AMOUNT TO ENTIRE FACE EVERY NIGHT AT BEDTIME  1    triamcinolone (KENALOG) 0 1 % ointment 1 APPLICATION APPLY ON THE SKIN TWICE A DAY APPLY TO AFFECTED AREA TWICE A DAY  0     No current facility-administered medications for this visit  Objective:    /80   Pulse 72   Temp 97 8 °F (36 6 °C)   Resp 13   Ht 5' 3" (1 6 m)   Wt 93 kg (205 lb)   SpO2 97%   BMI 36 31 kg/m²        Physical Exam   Constitutional: She appears well-developed and well-nourished  Eyes: Pupils are equal, round, and reactive to light  EOM are normal    Neck: Normal range of motion  Neck supple  Cardiovascular: Normal rate, regular rhythm, normal heart sounds and intact distal pulses  Pulmonary/Chest: Effort normal and breath sounds normal    Abdominal: Soft  Bowel sounds are normal    Musculoskeletal: Normal range of motion  Neurological: She is alert  Skin: Skin is warm  Capillary refill takes less than 2 seconds  Psychiatric: She has a normal mood and affect  Her behavior is normal  Judgment and thought content normal    Nursing note and vitals reviewed               Milana Randall DO

## 2019-10-24 ENCOUNTER — CLINICAL SUPPORT (OUTPATIENT)
Dept: FAMILY MEDICINE CLINIC | Facility: CLINIC | Age: 24
End: 2019-10-24

## 2019-10-24 DIAGNOSIS — Z11.1 SCREENING FOR TUBERCULOSIS: Primary | ICD-10-CM

## 2019-10-24 LAB
INDURATION: 0 MM
TB SKIN TEST: NEGATIVE

## 2019-10-24 PROCEDURE — RECHECK

## 2019-10-25 NOTE — PROGRESS NOTES
Assessment/Plan:    No problem-specific Assessment & Plan notes found for this encounter  There are no diagnoses linked to this encounter  Subjective:      Patient ID: Hugo Cui is a 20 y o  female  HPI        Review of Systems      Objective: There were no vitals taken for this visit           Physical Exam

## 2019-11-25 PROBLEM — K91.2 POSTSURGICAL MALABSORPTION: Status: ACTIVE | Noted: 2019-11-25

## 2019-11-26 ENCOUNTER — OFFICE VISIT (OUTPATIENT)
Dept: GYNECOLOGIC ONCOLOGY | Facility: CLINIC | Age: 24
End: 2019-11-26
Payer: COMMERCIAL

## 2019-11-26 VITALS
HEIGHT: 63 IN | BODY MASS INDEX: 35.08 KG/M2 | DIASTOLIC BLOOD PRESSURE: 84 MMHG | HEART RATE: 77 BPM | SYSTOLIC BLOOD PRESSURE: 120 MMHG | WEIGHT: 198 LBS | TEMPERATURE: 98.4 F

## 2019-11-26 DIAGNOSIS — N87.1 MODERATE DYSPLASIA OF CERVIX (CIN II): Primary | ICD-10-CM

## 2019-11-26 PROCEDURE — 88175 CYTOPATH C/V AUTO FLUID REDO: CPT | Performed by: OBSTETRICS & GYNECOLOGY

## 2019-11-26 PROCEDURE — 88305 TISSUE EXAM BY PATHOLOGIST: CPT | Performed by: PATHOLOGY

## 2019-11-26 PROCEDURE — 87624 HPV HI-RISK TYP POOLED RSLT: CPT | Performed by: OBSTETRICS & GYNECOLOGY

## 2019-11-27 ENCOUNTER — OFFICE VISIT (OUTPATIENT)
Dept: BARIATRICS | Facility: CLINIC | Age: 24
End: 2019-11-27
Payer: COMMERCIAL

## 2019-11-27 VITALS
SYSTOLIC BLOOD PRESSURE: 118 MMHG | DIASTOLIC BLOOD PRESSURE: 64 MMHG | TEMPERATURE: 98.2 F | HEART RATE: 67 BPM | WEIGHT: 197 LBS | BODY MASS INDEX: 34.91 KG/M2 | HEIGHT: 63 IN

## 2019-11-27 DIAGNOSIS — K91.2 POSTSURGICAL MALABSORPTION: ICD-10-CM

## 2019-11-27 DIAGNOSIS — E28.2 PCOS (POLYCYSTIC OVARIAN SYNDROME): ICD-10-CM

## 2019-11-27 DIAGNOSIS — E66.09 CLASS 1 OBESITY DUE TO EXCESS CALORIES WITHOUT SERIOUS COMORBIDITY WITH BODY MASS INDEX (BMI) OF 34.0 TO 34.9 IN ADULT: ICD-10-CM

## 2019-11-27 DIAGNOSIS — Z98.84 BARIATRIC SURGERY STATUS: Primary | ICD-10-CM

## 2019-11-27 PROBLEM — E66.9 OBESITY: Status: ACTIVE | Noted: 2019-11-27

## 2019-11-27 PROBLEM — E66.01 MORBID OBESITY (HCC): Status: RESOLVED | Noted: 2019-07-15 | Resolved: 2019-11-27

## 2019-11-27 PROBLEM — T81.89XA INCISIONAL IRRITATION: Status: RESOLVED | Noted: 2019-09-10 | Resolved: 2019-11-27

## 2019-11-27 PROCEDURE — 99214 OFFICE O/P EST MOD 30 MIN: CPT | Performed by: PHYSICIAN ASSISTANT

## 2019-11-27 NOTE — ASSESSMENT & PLAN NOTE
-s/p Vertical Sleeve Gastrectomy with Dr Belle Or on 8/13/2019  Overall doing Well  Initial:244 lb  Current:197 lb  EWL: 46%  Sergio:current  Current BMI is Body mass index is 34 9 kg/m²      Tolerating a regular diet-yes  Eating at least 60 grams of protein per day-yes  Following 30/60 minute rule with liquids-yes  Drinking at least 64 ounces of fluid per day-yes  Drinking carbonated beverages-no  Sufficient exercise-primarily walking-encouraged to increase as tolerated  Using NSAIDs regularly-no  Using nicotine-no  Using alcohol-tried a couple sips at a wedding "and it scared me"-advised on effect after surgery and encouraged to avoid

## 2019-11-27 NOTE — ASSESSMENT & PLAN NOTE
Improved from obesity ii with bmiof 39 6 at her first post-op visit with her surgeon to current bmi of 34 9

## 2019-11-27 NOTE — ASSESSMENT & PLAN NOTE
Malabsorption- patient is at risk for malabsorption of vitamins/minerals secondary to malabsorption from her procedure and restriction of intakes  Reviewed current supplements and advised on same    She is taking 4 bariatric fusion and one calcium citrate with d (500 mg)-she is due for routine labs

## 2019-11-27 NOTE — ASSESSMENT & PLAN NOTE
She reports  Increased menstrual cramping/but no other changes with menses  She is on oral birth control  Advised we recommend 2 forms of birth control up to 2 years post-op to avoid pregnancy/for the healthiest pregnancy if she is sexually active  Encouraged her to follow-up with GYN as with hormonal changes she MAY need adjustments in form or dose of birth control-she may not be absorbing full amount

## 2019-11-27 NOTE — PROGRESS NOTES
Assessment/Plan:    Bariatric surgery status  -s/p Vertical Sleeve Gastrectomy with Dr Joanna Boast on 8/13/2019  Overall doing Well  Initial:244 lb  Current:197 lb  EWL: 46%  Sergio:current  Current BMI is Body mass index is 34 9 kg/m²  Tolerating a regular diet-yes  Eating at least 60 grams of protein per day-yes  Following 30/60 minute rule with liquids-yes  Drinking at least 64 ounces of fluid per day-yes  Drinking carbonated beverages-no  Sufficient exercise-primarily walking-encouraged to increase as tolerated  Using NSAIDs regularly-no  Using nicotine-no  Using alcohol-tried a couple sips at a wedding "and it scared me"-advised on effect after surgery and encouraged to avoid        Postsurgical malabsorption  Malabsorption- patient is at risk for malabsorption of vitamins/minerals secondary to malabsorption from her procedure and restriction of intakes  Reviewed current supplements and advised on same    She is taking 4 bariatric fusion and one calcium citrate with d (500 mg)-she is due for routine labs    Obesity  Improved from obesity ii with bmiof 39 6 at her first post-op visit with her surgeon to current bmi of 34 9    PCOS (polycystic ovarian syndrome)  She reports  Increased menstrual cramping/but no other changes with menses  She is on oral birth control  Advised we recommend 2 forms of birth control up to 2 years post-op to avoid pregnancy/for the healthiest pregnancy if she is sexually active  Encouraged her to follow-up with GYN as with hormonal changes she MAY need adjustments in form or dose of birth control-she may not be absorbing full amount  Diagnoses and all orders for this visit:    Bariatric surgery status  -     CBC and Platelet; Future  -     Comprehensive metabolic panel; Future  -     Copper Level; Future  -     Ferritin; Future  -     Folate; Future  -     Iron Saturation %; Future  -     PTH, intact;  Future  -     Vitamin A; Future  -     Vitamin B1, whole blood; Future  -     Vitamin B12; Future  -     Vitamin D 25 hydroxy; Future  -     Zinc; Future    Postsurgical malabsorption  -     CBC and Platelet; Future  -     Comprehensive metabolic panel; Future  -     Copper Level; Future  -     Ferritin; Future  -     Folate; Future  -     Iron Saturation %; Future  -     PTH, intact; Future  -     Vitamin A; Future  -     Vitamin B1, whole blood; Future  -     Vitamin B12; Future  -     Vitamin D 25 hydroxy; Future  -     Zinc; Future    Class 1 obesity due to excess calories without serious comorbidity with body mass index (BMI) of 34 0 to 34 9 in adult  -     CBC and Platelet; Future  -     Comprehensive metabolic panel; Future  -     Copper Level; Future  -     Ferritin; Future  -     Folate; Future  -     Iron Saturation %; Future  -     PTH, intact; Future  -     Vitamin A; Future  -     Vitamin B1, whole blood; Future  -     Vitamin B12; Future  -     Vitamin D 25 hydroxy; Future  -     Zinc; Future    PCOS (polycystic ovarian syndrome)  -     CBC and Platelet; Future  -     Comprehensive metabolic panel; Future  -     Copper Level; Future  -     Ferritin; Future  -     Folate; Future  -     Iron Saturation %; Future  -     PTH, intact; Future  -     Vitamin A; Future  -     Vitamin B1, whole blood; Future  -     Vitamin B12; Future  -     Vitamin D 25 hydroxy; Future  -     Zinc; Future          Subjective:      Patient ID: Billy Ramsay is a 20 y o  female      HPI    The following portions of the patient's history were reviewed and updated as appropriate: allergies, current medications, past family history, past medical history, past social history, past surgical history and problem list     Review of Systems      Objective:      /64 (BP Location: Left arm, Patient Position: Sitting)   Pulse 67   Temp 98 2 °F (36 8 °C) (Tympanic)   Ht 5' 3" (1 6 m)   Wt 89 4 kg (197 lb)   BMI 34 90 kg/m²     GOALS: Continued weight loss with good nutrition intakes  Normal vitamin and mineral levels  Exercise as tolerated    BARRIERS: none identified       Physical Exam   Constitutional: She is oriented to person, place, and time  She appears well-developed and well-nourished  HENT:   Mouth/Throat: Oropharynx is clear and moist    Eyes: Conjunctivae are normal  No scleral icterus  Cardiovascular: Normal rate and regular rhythm  Pulmonary/Chest: Effort normal and breath sounds normal    Abdominal: Soft  There is no tenderness  No incisional hernias appreciated; all incisions closed/well-healed   Musculoskeletal:   Normal gait   Neurological: She is alert and oriented to person, place, and time  Psychiatric: She has a normal mood and affect  Her behavior is normal  Judgment and thought content normal    Nursing note and vitals reviewed  GOALS: Continued weight loss with good nutrition intakes    Normal vitamin and mineral levels  Exercise as tolerated    BARRIERS: none identified

## 2019-11-29 ENCOUNTER — OFFICE VISIT (OUTPATIENT)
Dept: OBGYN CLINIC | Facility: CLINIC | Age: 24
End: 2019-11-29
Payer: COMMERCIAL

## 2019-11-29 VITALS
HEIGHT: 63 IN | DIASTOLIC BLOOD PRESSURE: 74 MMHG | BODY MASS INDEX: 35.26 KG/M2 | SYSTOLIC BLOOD PRESSURE: 120 MMHG | WEIGHT: 199 LBS

## 2019-11-29 DIAGNOSIS — N76.4 VULVAR ABSCESS: ICD-10-CM

## 2019-11-29 DIAGNOSIS — N90.89 VULVAR LESION: Primary | ICD-10-CM

## 2019-11-29 PROCEDURE — 99213 OFFICE O/P EST LOW 20 MIN: CPT | Performed by: PHYSICIAN ASSISTANT

## 2019-11-29 RX ORDER — CEPHALEXIN 500 MG/1
500 CAPSULE ORAL EVERY 6 HOURS SCHEDULED
Qty: 28 CAPSULE | Refills: 0 | Status: SHIPPED | OUTPATIENT
Start: 2019-11-29 | End: 2019-12-06

## 2019-11-29 NOTE — ASSESSMENT & PLAN NOTE
Reviewed with patient vulvar lesion will likely resolve on own  Recommend warm compresses on the area  Keep area clean  Patient is losing insurance today secondary to being 24th birthday, believes new insurance will start in the next week or so  Reviewed if worsens, becomes very tender, hot, develops fever or chills recommend starting antibiotic  Script for Keflex 500mg QID x 7 days sent to pharmacy  Patient will pick it up while she still has insurance just in case she needs it  Call office if not improving or worsening

## 2019-11-29 NOTE — PROGRESS NOTES
Assessment/Plan:    Vulvar lesion  Reviewed with patient vulvar lesion will likely resolve on own  Recommend warm compresses on the area  Keep area clean  Patient is losing insurance today secondary to being 24th birthday, believes new insurance will start in the next week or so  Reviewed if worsens, becomes very tender, hot, develops fever or chills recommend starting antibiotic  Script for Keflex 500mg QID x 7 days sent to pharmacy  Patient will pick it up while she still has insurance just in case she needs it  Call office if not improving or worsening  Problem List Items Addressed This Visit        Other    Vulvar lesion - Primary     Reviewed with patient vulvar lesion will likely resolve on own  Recommend warm compresses on the area  Keep area clean  Patient is losing insurance today secondary to being 24th birthday, believes new insurance will start in the next week or so  Reviewed if worsens, becomes very tender, hot, develops fever or chills recommend starting antibiotic  Script for Keflex 500mg QID x 7 days sent to pharmacy  Patient will pick it up while she still has insurance just in case she needs it  Call office if not improving or worsening  Other Visit Diagnoses     Vulvar abscess        Relevant Medications    cephalexin (KEFLEX) 500 mg capsule            Subjective:      Patient ID: Nick Davenport is a 30 y o  female  HPI  29 yo seen for bump on vulva  Patient was just seen by Dr Jennifer Romo on Tuesday for a colposcopy  Reports the next day noticed small bump on right side of labia  Tender to touch, swollen  Denies any bleeding or purulent fluid, no itching discharge, odor, no recent shaving or waxing  Denies fever, chills, bowel or bladder issues  Has not used anything OTC  Not sexually active currently, declines STD testing  Menses starting on 11/26 ending now  Patient is losing insurance today secondary to being 24th birthday     The following portions of the patient's history were reviewed and updated as appropriate:   She  has a past medical history of Abnormal Pap smear of cervix, Acne, Asthma, Bariatric surgery status, Chronic thoracic back pain (11/9/2017), Eczema, Exercises 1 to 2 times per week, External hemorrhoids, Fatigue, Hospital discharge follow-up (8/19/2019), HPV (human papilloma virus) infection, Obesity, PCOS (polycystic ovarian syndrome), and Postsurgical malabsorption  She   Patient Active Problem List    Diagnosis Date Noted    Vulvar lesion 11/29/2019    Obesity 11/27/2019    Postsurgical malabsorption 11/25/2019    Need for vaccination 10/22/2019    Encounter for gynecological examination (general) (routine) without abnormal findings 09/17/2019    Bariatric surgery status 09/10/2019    PCOS (polycystic ovarian syndrome) 07/17/2019    Annual physical exam 04/25/2019    Moderate dysplasia of cervix (SALTY II) 01/31/2019    Hyperinsulinemia 12/19/2018    Acne vulgaris 04/17/2018    Hemorrhoids, external 04/21/2017     She  has a past surgical history that includes pr reduction of large breast (Bilateral, 2/22/2018); [place] tooth extraction; Reduction mammaplasty; EGD; and pr lap, kyler restrict proc, longitudinal gastrectomy (N/A, 8/13/2019)  Her family history includes Breast cancer in her maternal aunt; Cancer in her maternal aunt; Clotting disorder in her mother; Diabetes in her father  She  reports that she has never smoked  She has never used smokeless tobacco  She reports that she drinks alcohol  She reports that she does not use drugs    Current Outpatient Medications   Medication Sig Dispense Refill    cephalexin (KEFLEX) 500 mg capsule Take 1 capsule (500 mg total) by mouth every 6 (six) hours for 7 days 28 capsule 0    clindamycin-benzoyl peroxide (BENZACLIN) gel APPLY TO FACE ONCE TO TWICE DAILY AS NEEDED FOR SPOT TX FOR FLARES  1    levonorgestrel-ethinyl estradiol (AVIANE,ALESSE,LESSINA) 0 1-20 MG-MCG per tablet Take 1 tablet by mouth daily 84 tablet 3    tretinoin (RETIN-A) 0 025 % cream APPLY A PEA SIZED AMOUNT TO ENTIRE FACE EVERY NIGHT AT BEDTIME  1    triamcinolone (KENALOG) 0 1 % ointment 1 APPLICATION APPLY ON THE SKIN TWICE A DAY APPLY TO AFFECTED AREA TWICE A DAY  0     No current facility-administered medications for this visit  She has No Known Allergies       Review of Systems   Constitutional: Negative for fatigue, fever and unexpected weight change  HENT: Negative for dental problem and sinus pressure  Eyes: Negative for visual disturbance  Respiratory: Negative for cough, shortness of breath and wheezing  Cardiovascular: Negative for chest pain  Gastrointestinal: Negative for abdominal pain, blood in stool, constipation, diarrhea, nausea and vomiting  Endocrine: Negative for polydipsia  Genitourinary: Positive for genital sores  Negative for difficulty urinating, dyspareunia, dysuria, frequency, hematuria, pelvic pain and urgency  Musculoskeletal: Negative for arthralgias and back pain  Neurological: Negative for dizziness, seizures, light-headedness and headaches  Psychiatric/Behavioral: Negative for suicidal ideas  The patient is not nervous/anxious  Objective:      /74 (BP Location: Left arm, Patient Position: Sitting, Cuff Size: Standard)   Ht 5' 3" (1 6 m)   Wt 90 3 kg (199 lb)   LMP 11/26/2019 (Exact Date)   BMI 35 25 kg/m²          Physical Exam   Constitutional: She is oriented to person, place, and time  She appears well-developed and well-nourished  Neck: Neck supple  No thyroid mass and no thyromegaly present  Cardiovascular: Normal rate, regular rhythm and normal heart sounds  Exam reveals no gallop and no friction rub  No murmur heard  Pulmonary/Chest: Effort normal and breath sounds normal  No respiratory distress  She has no wheezes  She has no rales  Abdominal: Soft  Normal appearance and bowel sounds are normal  She exhibits no distension and no mass  There is no tenderness  There is no rebound and no guarding  Genitourinary:       No breast tenderness, discharge or bleeding  There is tenderness and lesion on the right labia  There is no rash or injury on the right labia  There is no rash, tenderness, lesion or injury on the left labia  Uterus is not deviated, not enlarged and not tender  Cervix exhibits no motion tenderness, no discharge and no friability  Right adnexum displays no mass, no tenderness and no fullness  Left adnexum displays no mass, no tenderness and no fullness  No erythema, tenderness or bleeding in the vagina  No signs of injury around the vagina  No vaginal discharge found  Lymphadenopathy:     She has no cervical adenopathy  Right: No inguinal and no supraclavicular adenopathy present  Left: No inguinal and no supraclavicular adenopathy present  Neurological: She is alert and oriented to person, place, and time  Skin: Skin is warm and dry  No rash noted  No erythema  No pallor  Psychiatric: She has a normal mood and affect   Her behavior is normal  Judgment and thought content normal

## 2019-12-01 LAB
HPV HR 12 DNA CVX QL NAA+PROBE: NEGATIVE
HPV16 DNA CVX QL NAA+PROBE: NEGATIVE
HPV18 DNA CVX QL NAA+PROBE: NEGATIVE

## 2019-12-02 LAB
LAB AP GYN PRIMARY INTERPRETATION: NORMAL
Lab: NORMAL

## 2019-12-02 PROCEDURE — 57455 BIOPSY OF CERVIX W/SCOPE: CPT | Performed by: OBSTETRICS & GYNECOLOGY

## 2019-12-02 NOTE — ASSESSMENT & PLAN NOTE
57-year-old with moderate cervical dysplasia  Pap smear and colposcopy performed  Colposcopy was consistent with SALTY 1-2  Her performance status is 0   1  Follow-up results of Pap smear and cervical biopsy and plan treatment/follow-up accordingly

## 2019-12-02 NOTE — PROGRESS NOTES
Colposcopy  Date/Time: 11/26/2019 4:15 PM  Performed by: Melodie Parson MD  Authorized by: Melodie Parson MD     Consent:     Consent obtained:  Verbal    Consent given by:  Patient    Procedural risks discussed:  Bleeding    Patient questions answered: yes      Patient agrees, verbalizes understanding, and wants to proceed: yes    Pre-procedure:     Prepped with: acetic acid    Indication:     Indications: SALTY 2  Procedure:     Procedure: Colposcopy w/ biopsy of cervix      Under colposcopic examination the transition zone was seen in entirety: yes      Cervical biopsy performed with a cervical biopsy punch: yes      Monsel's solution was applied: yes      Biopsy(s): yes      Location:  12 oclock    Specimen to pathology: yes    Post-procedure:     Findings: White epithelium      Impression: Low grade cervical dysplasia      Patient tolerance of procedure: Tolerated well, no immediate complications  Comments:      Pap smear also performed

## 2019-12-04 ENCOUNTER — TELEPHONE (OUTPATIENT)
Dept: GYNECOLOGIC ONCOLOGY | Facility: HOSPITAL | Age: 24
End: 2019-12-04

## 2019-12-04 DIAGNOSIS — R78.79 HIGH BLOOD COPPER LEVEL: Primary | ICD-10-CM

## 2019-12-04 NOTE — TELEPHONE ENCOUNTER
Detail Level: Detailed Pre-op call was made to patient, message left, to follow up on how they are doing and to remind them to continue with all medical and dietary directions that were given at pre-op class regarding liver shrinking diet and hydration  They were encouraged to purchase all vitamins and protein shakes for post op use as well as to begin Miralax three days prior to surgery as directed in Section 6 of their manual   They were reminded of the Ensure Pre-surgery drinks protocol and to bring their completed yellow form with them to surgery as well as their CPAP-BiPAP machine if they use one  Lastly, they were informed that they would be weighed the morning of surgery and to give the office a call if they had any further questions or concerns  Detail Level: Zone

## 2019-12-09 ENCOUNTER — TELEPHONE (OUTPATIENT)
Dept: GYNECOLOGIC ONCOLOGY | Facility: CLINIC | Age: 24
End: 2019-12-09

## 2019-12-09 NOTE — TELEPHONE ENCOUNTER
Received inbasket message from Dr Rafael Kirby to schedule this patient for a 1 year follow up for pap smear  Called patient to schedule this appointment and cancel the 6 month f/u scheduled for June 2020 and had to leave a message  Requested for the patient to call back

## 2020-01-29 ENCOUNTER — OFFICE VISIT (OUTPATIENT)
Dept: OBGYN CLINIC | Facility: CLINIC | Age: 25
End: 2020-01-29
Payer: COMMERCIAL

## 2020-01-29 VITALS
DIASTOLIC BLOOD PRESSURE: 80 MMHG | HEIGHT: 64 IN | BODY MASS INDEX: 31.76 KG/M2 | SYSTOLIC BLOOD PRESSURE: 116 MMHG | WEIGHT: 186 LBS

## 2020-01-29 DIAGNOSIS — L70.0 ACNE VULGARIS: Primary | ICD-10-CM

## 2020-01-29 DIAGNOSIS — N92.0 MENORRHAGIA WITH REGULAR CYCLE: ICD-10-CM

## 2020-01-29 PROCEDURE — 99213 OFFICE O/P EST LOW 20 MIN: CPT | Performed by: PHYSICIAN ASSISTANT

## 2020-01-29 RX ORDER — ETONOGESTREL AND ETHINYL ESTRADIOL 11.7; 2.7 MG/1; MG/1
INSERT, EXTENDED RELEASE VAGINAL
Qty: 1 EACH | Refills: 2 | Status: SHIPPED | OUTPATIENT
Start: 2020-01-29 | End: 2020-04-29

## 2020-01-29 NOTE — PROGRESS NOTES
Assessment/Plan:    Acne vulgaris  Reviewed in length with patient  Recommend continuing on combined hormone to help the best with PCOS and acne but agree pill is probably not being absorbed properly after gastric sleeve procedure  Reviewed options including NuvaRing and patch  Patient would like to try NuvaRing  Reviewed how to insert, can take out for up to 3 hrs during intercourse if bothering her or her partner  Reviewed when to start  Recommend using condoms for the 1st month and for STD prevention  Reviewed common side effects including nausea, vomiting, breast pain, bloating, fatigue, mood swings, weight gain, increased acne  Reassured side effects typically diminished in the 1st month or 2  Reviewed clotting risk and signs and symptoms of pulmonary embolism, DVT, myocardial infarction, stroke  Will follow up in 3 months for NuvaRing check  Problem List Items Addressed This Visit        Musculoskeletal and Integument    Acne vulgaris - Primary     Reviewed in length with patient  Recommend continuing on combined hormone to help the best with PCOS and acne but agree pill is probably not being absorbed properly after gastric sleeve procedure  Reviewed options including NuvaRing and patch  Patient would like to try NuvaRing  Reviewed how to insert, can take out for up to 3 hrs during intercourse if bothering her or her partner  Reviewed when to start  Recommend using condoms for the 1st month and for STD prevention  Reviewed common side effects including nausea, vomiting, breast pain, bloating, fatigue, mood swings, weight gain, increased acne  Reassured side effects typically diminished in the 1st month or 2  Reviewed clotting risk and signs and symptoms of pulmonary embolism, DVT, myocardial infarction, stroke  Will follow up in 3 months for NuvaRing check            Relevant Medications    etonogestrel-ethinyl estradiol (NUVARING) 0 12-0 015 MG/24HR vaginal ring      Other Visit Diagnoses Menorrhagia with regular cycle        Relevant Medications    etonogestrel-ethinyl estradiol (NUVARING) 0 12-0 015 MG/24HR vaginal ring            Subjective:      Patient ID: Jaxon Mancilla is a 30 y o  female  HPI  29 yo seen for birth control discussion  Currently on [facility] OCP  Was started secondary to acne and PCOS as well as menorrhagia  Patient had gastric sleeve procedure in 8/2019  Has lost 50+ lbs since  Restarted OCP 4 months ago and still having a lot of issues with acne  Spoke with GI surgery team and concerned it may be secondary to lack of absorption of current pill  Would like to discuss other options  The following portions of the patient's history were reviewed and updated as appropriate:   She  has a past medical history of Abnormal Pap smear of cervix, Acne, Asthma, Bariatric surgery status, Chronic thoracic back pain (11/9/2017), Eczema, Exercises 1 to 2 times per week, External hemorrhoids, Fatigue, Hospital discharge follow-up (8/19/2019), HPV (human papilloma virus) infection, Obesity, PCOS (polycystic ovarian syndrome), and Postsurgical malabsorption  She   Patient Active Problem List    Diagnosis Date Noted    Vulvar lesion 11/29/2019    Obesity 11/27/2019    Postsurgical malabsorption 11/25/2019    Need for vaccination 10/22/2019    Encounter for gynecological examination (general) (routine) without abnormal findings 09/17/2019    Bariatric surgery status 09/10/2019    PCOS (polycystic ovarian syndrome) 07/17/2019    Annual physical exam 04/25/2019    Moderate dysplasia of cervix (SALTY II) 01/31/2019    Hyperinsulinemia 12/19/2018    Acne vulgaris 04/17/2018    Hemorrhoids, external 04/21/2017     She  has a past surgical history that includes pr reduction of large breast (Bilateral, 2/22/2018); [place] tooth extraction; Reduction mammaplasty; EGD; and pr lap, kyler restrict proc, longitudinal gastrectomy (N/A, 8/13/2019)    Her family history includes Breast cancer in her maternal aunt; Cancer in her maternal aunt; Clotting disorder in her mother; Diabetes in her father  She  reports that she has never smoked  She has never used smokeless tobacco  She reports that she drinks alcohol  She reports that she does not use drugs  Current Outpatient Medications   Medication Sig Dispense Refill    clindamycin-benzoyl peroxide (BENZACLIN) gel APPLY TO FACE ONCE TO TWICE DAILY AS NEEDED FOR SPOT TX FOR FLARES  1    etonogestrel-ethinyl estradiol (NUVARING) 0 12-0 015 MG/24HR vaginal ring Insert vaginally and leave in place for 3 consecutive weeks, then remove for 1 week  1 each 2    levonorgestrel-ethinyl estradiol (AVIANE,ALESSE,LESSINA) 0 1-20 MG-MCG per tablet Take 1 tablet by mouth daily 84 tablet 3    tretinoin (RETIN-A) 0 025 % cream APPLY A PEA SIZED AMOUNT TO ENTIRE FACE EVERY NIGHT AT BEDTIME  1    triamcinolone (KENALOG) 0 1 % ointment 1 APPLICATION APPLY ON THE SKIN TWICE A DAY APPLY TO AFFECTED AREA TWICE A DAY  0     No current facility-administered medications for this visit  She has No Known Allergies       Review of Systems   Constitutional: Negative for fatigue, fever and unexpected weight change  HENT: Negative for dental problem and sinus pressure  Eyes: Negative for visual disturbance  Respiratory: Negative for cough, shortness of breath and wheezing  Cardiovascular: Negative for chest pain  Gastrointestinal: Negative for abdominal pain, blood in stool, constipation, diarrhea, nausea and vomiting  Endocrine: Negative for polydipsia  Genitourinary: Negative for difficulty urinating, dyspareunia, dysuria, frequency, hematuria, pelvic pain and urgency  Musculoskeletal: Negative for arthralgias and back pain  Neurological: Negative for dizziness, seizures, light-headedness and headaches  Psychiatric/Behavioral: Negative for suicidal ideas  The patient is not nervous/anxious            Objective:      /80 (BP Location: Left arm, Patient Position: Sitting, Cuff Size: Standard)   Ht 5' 4" (1 626 m)   Wt 84 4 kg (186 lb)   LMP 01/21/2020 (Exact Date)   BMI 31 93 kg/m²          Physical Exam   Constitutional: She is oriented to person, place, and time  She appears well-developed and well-nourished  Neck: No thyromegaly present  Cardiovascular: Normal rate, regular rhythm and normal heart sounds  Exam reveals no gallop and no friction rub  No murmur heard  Pulmonary/Chest: Effort normal and breath sounds normal  No respiratory distress  She has no wheezes  She has no rales  She exhibits no tenderness  Neurological: She is alert and oriented to person, place, and time  Skin: Skin is warm and dry  Psychiatric: She has a normal mood and affect   Her behavior is normal

## 2020-01-29 NOTE — ASSESSMENT & PLAN NOTE
Reviewed in length with patient  Recommend continuing on combined hormone to help the best with PCOS and acne but agree pill is probably not being absorbed properly after gastric sleeve procedure  Reviewed options including NuvaRing and patch  Patient would like to try NuvaRing  Reviewed how to insert, can take out for up to 3 hrs during intercourse if bothering her or her partner  Reviewed when to start  Recommend using condoms for the 1st month and for STD prevention  Reviewed common side effects including nausea, vomiting, breast pain, bloating, fatigue, mood swings, weight gain, increased acne  Reassured side effects typically diminished in the 1st month or 2  Reviewed clotting risk and signs and symptoms of pulmonary embolism, DVT, myocardial infarction, stroke  Will follow up in 3 months for NuvaRing check

## 2020-03-16 ENCOUNTER — OFFICE VISIT (OUTPATIENT)
Dept: BARIATRICS | Facility: CLINIC | Age: 25
End: 2020-03-16
Payer: COMMERCIAL

## 2020-03-16 VITALS
SYSTOLIC BLOOD PRESSURE: 120 MMHG | DIASTOLIC BLOOD PRESSURE: 80 MMHG | HEIGHT: 63 IN | TEMPERATURE: 98.4 F | HEART RATE: 66 BPM | WEIGHT: 180.5 LBS | BODY MASS INDEX: 31.98 KG/M2

## 2020-03-16 DIAGNOSIS — Z48.815 ENCOUNTER FOR SURGICAL AFTERCARE FOLLOWING SURGERY OF DIGESTIVE SYSTEM: Primary | Chronic | ICD-10-CM

## 2020-03-16 DIAGNOSIS — E53.8 LOW VITAMIN B12 LEVEL: ICD-10-CM

## 2020-03-16 DIAGNOSIS — Z98.84 BARIATRIC SURGERY STATUS: Chronic | ICD-10-CM

## 2020-03-16 DIAGNOSIS — K91.2 POSTSURGICAL MALABSORPTION: Chronic | ICD-10-CM

## 2020-03-16 DIAGNOSIS — E66.9 OBESITY, CLASS I, BMI 30-34.9: ICD-10-CM

## 2020-03-16 DIAGNOSIS — R78.79 HIGH BLOOD COPPER LEVEL: ICD-10-CM

## 2020-03-16 PROBLEM — E66.811 OBESITY, CLASS I, BMI 30-34.9: Status: ACTIVE | Noted: 2020-03-16

## 2020-03-16 PROBLEM — R79.89 LOW VITAMIN B12 LEVEL: Status: ACTIVE | Noted: 2020-03-16

## 2020-03-16 PROCEDURE — 99214 OFFICE O/P EST MOD 30 MIN: CPT | Performed by: PHYSICIAN ASSISTANT

## 2020-03-16 PROCEDURE — 1036F TOBACCO NON-USER: CPT | Performed by: PHYSICIAN ASSISTANT

## 2020-03-16 PROCEDURE — 3008F BODY MASS INDEX DOCD: CPT | Performed by: PHYSICIAN ASSISTANT

## 2020-03-16 NOTE — ASSESSMENT & PLAN NOTE
Malabsorption- patient is at risk for malabsorption of vitamins/minerals secondary to malabsorption from her procedure and restriction of intakes  Reviewed current supplements and advised on same    She is taking mvi with iron  And l lysine, collagen and biotin  Not consistent with calcium  And taking 500 mg vitamin B12 -inconsistent-she will be due for repeat labs prior to her annual visit-lab slip provided

## 2020-03-16 NOTE — PROGRESS NOTES
Assessment/Plan:    Obesity, Class I, BMI 30-34 9  Improved from bmi of 34 9 to current bmi of 28    Encounter for surgical aftercare following surgery of digestive system  -s/p Vertical Sleeve Gastrectomy with Dr Marian Barnett on 8/13/2019  Overall doing Well  Initial:244 lb  Current:180 5 lb  EWL: 62% which is above average for this time frame  Sergio:current  Current BMI is Body mass index is 31 97 kg/m²  Tolerating a regular diet-yes  Eating at least 60 grams of protein per day-yes  Following 30/60 minute rule with liquids-yes  Drinking at least 64 ounces of fluid per day-yes  Drinking carbonated beverages-no  Sufficient exercise-primarily walking/encouraged to increase as tolerated  Using NSAIDs regularly-no  Using nicotine-no  Using alcohol-yes/tried a glass of wine -advised on effect after her surgery and encouraged to abstain      Postsurgical malabsorption  Malabsorption- patient is at risk for malabsorption of vitamins/minerals secondary to malabsorption from her procedure and restriction of intakes  Reviewed current supplements and advised on same    She is taking mvi with iron  And l lysine, collagen and biotin  Not consistent with calcium  And taking 500 mg vitamin B12 -inconsistent-she will be due for repeat labs prior to her annual visit-lab slip provided    Low vitamin B12 level  She is taking an extra vitamin  mcg-but not as consistent-advised on importance of same    High blood copper level  Her copper level was high-I had provided her with lab slips to have this repeated-advised on trial of copper free bariatric advantage chewy bites and will get levels with her routine labs       Diagnoses and all orders for this visit:    Encounter for surgical aftercare following surgery of digestive system  -     CBC and Platelet; Future  -     Comprehensive metabolic panel; Future  -     Copper Level; Future  -     Ferritin; Future  -     Folate; Future  -     Lipid panel;  Future  -     PTH, intact; Future  -     Vitamin A; Future  -     Vitamin B1, whole blood; Future  -     Vitamin B12; Future  -     Vitamin D 25 hydroxy; Future  -     Zinc; Future    Obesity, Class I, BMI 30-34 9    Postsurgical malabsorption  -     CBC and Platelet; Future  -     Comprehensive metabolic panel; Future  -     Copper Level; Future  -     Ferritin; Future  -     Folate; Future  -     Lipid panel; Future  -     PTH, intact; Future  -     Vitamin A; Future  -     Vitamin B1, whole blood; Future  -     Vitamin B12; Future  -     Vitamin D 25 hydroxy; Future  -     Zinc; Future    Low vitamin B12 level  -     CBC and Platelet; Future  -     Comprehensive metabolic panel; Future  -     Copper Level; Future  -     Ferritin; Future  -     Folate; Future  -     Lipid panel; Future  -     PTH, intact; Future  -     Vitamin A; Future  -     Vitamin B1, whole blood; Future  -     Vitamin B12; Future  -     Vitamin D 25 hydroxy; Future  -     Zinc; Future    High blood copper level  -     CBC and Platelet; Future  -     Comprehensive metabolic panel; Future  -     Copper Level; Future  -     Ferritin; Future  -     Folate; Future  -     Lipid panel; Future  -     PTH, intact; Future  -     Vitamin A; Future  -     Vitamin B1, whole blood; Future  -     Vitamin B12; Future  -     Vitamin D 25 hydroxy; Future  -     Zinc; Future    Bariatric surgery status  -     CBC and Platelet; Future  -     Comprehensive metabolic panel; Future  -     Copper Level; Future  -     Ferritin; Future  -     Folate; Future  -     Lipid panel; Future  -     PTH, intact; Future  -     Vitamin A; Future  -     Vitamin B1, whole blood; Future  -     Vitamin B12; Future  -     Vitamin D 25 hydroxy; Future  -     Zinc; Future          Subjective:      Patient ID: Nick Davenport is a 30 y o  female  She is here in routine post op bariatric surgery follow-up to monitor diet tolerance, weight and vitamin/mineral intakes and status   She is tolerating a regular diet and losing weight  Primarily walking for exercise and taking baiatric vitamins      The following portions of the patient's history were reviewed and updated as appropriate: allergies, current medications, past family history, past medical history, past social history, past surgical history and problem list     Review of Systems   Constitutional: Negative for chills and fever  Unexpected weight change: planned weight loss  Respiratory: Negative for shortness of breath and wheezing  Cardiovascular: Negative for chest pain and palpitations  Gastrointestinal: Negative for abdominal pain, constipation, diarrhea, nausea and vomiting  Psychiatric/Behavioral: Suicidal ideas: no complait of anxiety or depression  Objective:      /80 (BP Location: Right arm, Patient Position: Sitting)   Pulse 66   Temp 98 4 °F (36 9 °C) (Tympanic)   Ht 5' 3" (1 6 m)   Wt 81 9 kg (180 lb 8 oz)   BMI 31 97 kg/m²          Physical Exam   Constitutional: She is oriented to person, place, and time  She appears well-developed and well-nourished  HENT:   Mouth/Throat: Oropharynx is clear and moist    Eyes: Conjunctivae are normal  No scleral icterus  Cardiovascular: Normal rate, regular rhythm and normal heart sounds  Pulmonary/Chest: Effort normal and breath sounds normal    Abdominal: Soft  There is no tenderness  No incisional hernias appreciated   Musculoskeletal:   Normal gait   Neurological: She is alert and oriented to person, place, and time  Skin: Skin is warm and dry  Psychiatric: She has a normal mood and affect  Nursing note and vitals reviewed  GOALS: Continued weight loss with good nutrition intakes    Normal vitamin and mineral levels  Exercise as tolerated    BARRIERS: none identified

## 2020-03-16 NOTE — PATIENT INSTRUCTIONS
You can try taking  Bariatric fusion chewy bites9-copper free) (2 per day) and 2-500 mg calcium citrate with Dand extra 500 mcg vitamin B12     Follow-up in 6 months  We kindly ask that your arrive 15 minutes before your scheduled appointment time with your provider to allow our staff to room you, get your vital signs and update your chart  We thank you for your patience at your visit  Follow diet as discussed  Get lab work done prior to next office visit  -IMPORTANT-your copper labs are already in the computer  (There is a 24 hour urine copper ordered for you to get done if the copper studies remain high-so make sure the  pulls all the labs for you  It is recommended to check with your insurance BEFORE getting labs done to make sure they are covered by your policy  Make sure to HOLD any multivitamins that may contain biotin and any biotin supplements FOR 5 DAYS before any labs since it can affect the results  IMPORTANT if you have a [facility] "[employer]" account, you will receive a letter of your vitamin/mineral results through the computer  Please watch for an update to your chart-since recommendations for supplement adjustments will be sent to you this way  Follow vitamin  and mineral recommendations as reviewed with you  Bariatric vitamins are highly recommended  It is important to take vitamins for a life-time  Low levels can lead to deficiencies which can lead to other medical problems    Exercise as tolerated    Call our office if you have any problems with abdominal pain especially associated with fever, chills, nausea, vomiting or any other concerns  All  Post-bariatric surgery patients should be aware that very small quantities of any alcohol  can cause impairment and it is very possible not to feel the effect  The effect can be in the system for several hours  It is also a stomach irritant       It is advised to AVOID alcohol, Nonsteroidal antiinflammatory drugs (NSAIDS) and nicotine of all forms   Any of these can cause stomach irritation/pain

## 2020-03-16 NOTE — ASSESSMENT & PLAN NOTE
Her copper level was high-I had provided her with lab slips to have this repeated-advised on trial of copper free bariatric advantage chewy bites and will get levels with her routine labs

## 2020-03-16 NOTE — ASSESSMENT & PLAN NOTE
-s/p Vertical Sleeve Gastrectomy with Dr Catrina Thakkar on 8/13/2019  Overall doing Well  Initial:244 lb  Current:180 5 lb  EWL: 62% which is above average for this time frame  Sergio:current  Current BMI is Body mass index is 31 97 kg/m²      Tolerating a regular diet-yes  Eating at least 60 grams of protein per day-yes  Following 30/60 minute rule with liquids-yes  Drinking at least 64 ounces of fluid per day-yes  Drinking carbonated beverages-no  Sufficient exercise-primarily walking/encouraged to increase as tolerated  Using NSAIDs regularly-no  Using nicotine-no  Using alcohol-yes/tried a glass of wine -advised on effect after her surgery and encouraged to abstain

## 2020-04-08 ENCOUNTER — TELEMEDICINE (OUTPATIENT)
Dept: DERMATOLOGY | Facility: CLINIC | Age: 25
End: 2020-04-08
Payer: COMMERCIAL

## 2020-04-08 VITALS — BODY MASS INDEX: 31.89 KG/M2 | WEIGHT: 180 LBS | HEIGHT: 63 IN

## 2020-04-08 DIAGNOSIS — L70.0 ACNE VULGARIS: Primary | ICD-10-CM

## 2020-04-08 PROCEDURE — 99212 OFFICE O/P EST SF 10 MIN: CPT | Performed by: STUDENT IN AN ORGANIZED HEALTH CARE EDUCATION/TRAINING PROGRAM

## 2020-04-08 RX ORDER — SPIRONOLACTONE 50 MG/1
50 TABLET, FILM COATED ORAL DAILY
Qty: 30 TABLET | Refills: 3 | Status: SHIPPED | OUTPATIENT
Start: 2020-04-08 | End: 2020-04-28

## 2020-04-08 RX ORDER — TRETINOIN 0.5 MG/G
CREAM TOPICAL
Qty: 45 G | Refills: 5 | Status: SHIPPED | OUTPATIENT
Start: 2020-04-08 | End: 2020-12-03

## 2020-04-28 ENCOUNTER — TELEPHONE (OUTPATIENT)
Dept: DERMATOLOGY | Facility: CLINIC | Age: 25
End: 2020-04-28

## 2020-04-28 DIAGNOSIS — L70.8 OTHER ACNE: Primary | ICD-10-CM

## 2020-04-28 RX ORDER — SPIRONOLACTONE 100 MG/1
100 TABLET, FILM COATED ORAL DAILY
Qty: 30 TABLET | Refills: 2 | Status: SHIPPED | OUTPATIENT
Start: 2020-04-28 | End: 2020-09-29

## 2020-04-29 ENCOUNTER — TELEMEDICINE (OUTPATIENT)
Dept: OBGYN CLINIC | Facility: CLINIC | Age: 25
End: 2020-04-29
Payer: COMMERCIAL

## 2020-04-29 VITALS — WEIGHT: 172 LBS | HEIGHT: 63 IN | BODY MASS INDEX: 30.48 KG/M2

## 2020-04-29 DIAGNOSIS — E28.2 PCOS (POLYCYSTIC OVARIAN SYNDROME): ICD-10-CM

## 2020-04-29 DIAGNOSIS — L70.0 ACNE VULGARIS: Primary | ICD-10-CM

## 2020-04-29 DIAGNOSIS — L70.0 ACNE VULGARIS: ICD-10-CM

## 2020-04-29 DIAGNOSIS — N92.0 MENORRHAGIA WITH REGULAR CYCLE: ICD-10-CM

## 2020-04-29 PROCEDURE — 3008F BODY MASS INDEX DOCD: CPT | Performed by: STUDENT IN AN ORGANIZED HEALTH CARE EDUCATION/TRAINING PROGRAM

## 2020-04-29 PROCEDURE — 99213 OFFICE O/P EST LOW 20 MIN: CPT | Performed by: PHYSICIAN ASSISTANT

## 2020-04-29 RX ORDER — ETONOGESTREL AND ETHINYL ESTRADIOL 11.7; 2.7 MG/1; MG/1
INSERT, EXTENDED RELEASE VAGINAL
Qty: 3 EACH | Refills: 2 | Status: SHIPPED | OUTPATIENT
Start: 2020-04-29 | End: 2020-09-29 | Stop reason: SDUPTHER

## 2020-05-18 ENCOUNTER — TELEMEDICINE (OUTPATIENT)
Dept: FAMILY MEDICINE CLINIC | Facility: CLINIC | Age: 25
End: 2020-05-18
Payer: COMMERCIAL

## 2020-05-18 ENCOUNTER — TELEPHONE (OUTPATIENT)
Dept: FAMILY MEDICINE CLINIC | Facility: CLINIC | Age: 25
End: 2020-05-18

## 2020-05-18 VITALS — WEIGHT: 168.2 LBS | BODY MASS INDEX: 29.8 KG/M2

## 2020-05-18 DIAGNOSIS — J45.20 MILD INTERMITTENT EXTRINSIC ASTHMA WITHOUT COMPLICATION: Primary | ICD-10-CM

## 2020-05-18 PROBLEM — J45.909 EXTRINSIC ASTHMA WITHOUT COMPLICATION: Status: ACTIVE | Noted: 2020-05-18

## 2020-05-18 PROCEDURE — 99213 OFFICE O/P EST LOW 20 MIN: CPT | Performed by: FAMILY MEDICINE

## 2020-05-18 RX ORDER — ALBUTEROL SULFATE 90 UG/1
2 AEROSOL, METERED RESPIRATORY (INHALATION) EVERY 6 HOURS PRN
Qty: 1 INHALER | Refills: 1 | Status: SHIPPED | OUTPATIENT
Start: 2020-05-18

## 2020-06-17 ENCOUNTER — TELEMEDICINE (OUTPATIENT)
Dept: DERMATOLOGY | Facility: CLINIC | Age: 25
End: 2020-06-17
Payer: COMMERCIAL

## 2020-06-17 DIAGNOSIS — L70.8 OTHER ACNE: ICD-10-CM

## 2020-06-17 PROCEDURE — 99203 OFFICE O/P NEW LOW 30 MIN: CPT | Performed by: STUDENT IN AN ORGANIZED HEALTH CARE EDUCATION/TRAINING PROGRAM

## 2020-06-17 RX ORDER — SPIRONOLACTONE 50 MG/1
150 TABLET, FILM COATED ORAL DAILY
Qty: 90 TABLET | Refills: 3 | Status: SHIPPED | OUTPATIENT
Start: 2020-06-17 | End: 2020-12-03 | Stop reason: SDUPTHER

## 2020-07-06 ENCOUNTER — TELEPHONE (OUTPATIENT)
Dept: OBGYN CLINIC | Facility: CLINIC | Age: 25
End: 2020-07-06

## 2020-08-03 ENCOUNTER — CLINICAL SUPPORT (OUTPATIENT)
Dept: FAMILY MEDICINE CLINIC | Facility: CLINIC | Age: 25
End: 2020-08-03
Payer: COMMERCIAL

## 2020-08-03 ENCOUNTER — TELEPHONE (OUTPATIENT)
Dept: FAMILY MEDICINE CLINIC | Facility: CLINIC | Age: 25
End: 2020-08-03

## 2020-08-03 DIAGNOSIS — Z11.1 PPD SCREENING TEST: Primary | ICD-10-CM

## 2020-08-03 PROCEDURE — 86580 TB INTRADERMAL TEST: CPT

## 2020-08-03 NOTE — TELEPHONE ENCOUNTER
Patient called and stated that her [employer] just told her they need a 12 panel drug screen ( I asked if it was blood or urine and she stated that most people that have gotten it done it was blood work) she needs it for next week   She wanted to know if Dr Kvng Thakkar can put in an order for her Please advise      [facility]

## 2020-08-04 DIAGNOSIS — Z02.83 ENCOUNTER FOR DRUG SCREENING: Primary | ICD-10-CM

## 2020-08-04 NOTE — TELEPHONE ENCOUNTER
Spoke to lab   The test would go through lab aneesh  Test are for Benzodiazepines, Amphetamine, Barbitues, Cocaine, Methamphetomine, opiates, oxycodone, tramadol, creatine, methadone, marijuana, phencyclidie

## 2020-08-05 ENCOUNTER — CLINICAL SUPPORT (OUTPATIENT)
Dept: FAMILY MEDICINE CLINIC | Facility: CLINIC | Age: 25
End: 2020-08-05

## 2020-08-05 ENCOUNTER — TRANSCRIBE ORDERS (OUTPATIENT)
Dept: LAB | Facility: CLINIC | Age: 25
End: 2020-08-05

## 2020-08-05 DIAGNOSIS — Z11.1 ENCOUNTER FOR PPD SKIN TEST READING: ICD-10-CM

## 2020-08-05 DIAGNOSIS — Z00.8 ENCOUNTER FOR WORK CAPABILITY ASSESSMENT: Primary | ICD-10-CM

## 2020-08-05 LAB
INDURATION: 0 MM
TB SKIN TEST: NEGATIVE

## 2020-08-05 PROCEDURE — RECHECK

## 2020-08-05 NOTE — PROGRESS NOTES
Assessment/Plan:    No problem-specific Assessment & Plan notes found for this encounter  Diagnoses and all orders for this visit:    Encounter for work capability assessment  -     Drug Screen 12+Oxycodone+CRT-Bund    Encounter for PPD skin test reading          Subjective:      Patient ID: Rose Hayden is a 30 y o  female  HPI        Review of Systems      Objective: There were no vitals taken for this visit           Physical Exam

## 2020-08-11 LAB
AMPHETAMINES UR QL SCN: NEGATIVE NG/ML
BARBITURATES UR QL: NEGATIVE NG/ML
BENZODIAZ UR QL: NEGATIVE NG/ML
BZE UR QL: NEGATIVE NG/ML
CANNABINOIDS USUB QL SCN: NEGATIVE NG/ML
CREAT UR-MCNC: 241.5 MG/DL (ref 20–300)
FENTANYL+NORFENTANYL UR QL SCN: NEGATIVE PG/ML
MEPERIDINE UR QL: NEGATIVE NG/ML
METHADONE UR QL: NEGATIVE NG/ML
OPIATES UR QL SCN: NEGATIVE NG/ML
OXYCODONE+OXYMORPHONE UR QL SCN: NEGATIVE NG/ML
PCP UR QL: NEGATIVE NG/ML
PROPOXYPH UR QL: NEGATIVE NG/ML
TRAMADOL UR QL SCN: NEGATIVE NG/ML

## 2020-09-16 PROBLEM — E66.3 OVERWEIGHT: Status: ACTIVE | Noted: 2020-09-16

## 2020-09-16 PROBLEM — E66.9 OBESITY: Status: RESOLVED | Noted: 2019-11-27 | Resolved: 2020-09-16

## 2020-09-29 ENCOUNTER — ANNUAL EXAM (OUTPATIENT)
Dept: OBGYN CLINIC | Facility: CLINIC | Age: 25
End: 2020-09-29
Payer: COMMERCIAL

## 2020-09-29 VITALS
TEMPERATURE: 97.1 F | BODY MASS INDEX: 27.82 KG/M2 | SYSTOLIC BLOOD PRESSURE: 116 MMHG | WEIGHT: 157 LBS | DIASTOLIC BLOOD PRESSURE: 80 MMHG | HEIGHT: 63 IN

## 2020-09-29 DIAGNOSIS — L70.0 ACNE VULGARIS: ICD-10-CM

## 2020-09-29 DIAGNOSIS — Z11.3 SCREENING FOR STD (SEXUALLY TRANSMITTED DISEASE): ICD-10-CM

## 2020-09-29 DIAGNOSIS — N92.0 MENORRHAGIA WITH REGULAR CYCLE: ICD-10-CM

## 2020-09-29 DIAGNOSIS — Z01.419 ENCOUNTER FOR GYNECOLOGICAL EXAMINATION (GENERAL) (ROUTINE) WITHOUT ABNORMAL FINDINGS: Primary | ICD-10-CM

## 2020-09-29 PROBLEM — N90.89 VULVAR LESION: Status: RESOLVED | Noted: 2019-11-29 | Resolved: 2020-09-29

## 2020-09-29 PROCEDURE — 87591 N.GONORRHOEAE DNA AMP PROB: CPT | Performed by: PHYSICIAN ASSISTANT

## 2020-09-29 PROCEDURE — 99395 PREV VISIT EST AGE 18-39: CPT | Performed by: PHYSICIAN ASSISTANT

## 2020-09-29 PROCEDURE — 87491 CHLMYD TRACH DNA AMP PROBE: CPT | Performed by: PHYSICIAN ASSISTANT

## 2020-09-29 RX ORDER — ETONOGESTREL AND ETHINYL ESTRADIOL 11.7; 2.7 MG/1; MG/1
INSERT, EXTENDED RELEASE VAGINAL
Qty: 3 EACH | Refills: 2 | Status: SHIPPED | OUTPATIENT
Start: 2020-09-29 | End: 2021-06-29 | Stop reason: SDUPTHER

## 2020-09-29 NOTE — PROGRESS NOTES
Assessment/Plan   Diagnoses and all orders for this visit:    Encounter for gynecological examination (general) (routine) without abnormal findings  The current ASCCP guidelines were reviewed  Patient's last pap was 11/26/19 WNL (-) HRHPV type 16/18 negative ECC benign (Dr Mark Deluna); 9/17/19 - WNL (-) HRHPV type 16/18 negative and therefore, a pap with HPV cotesting is not indicated at this time - patient has a follow-up colpo with Dr Mark Deluna 12/2020  I emphasized the importance of an annual pelvic and breast exam  Patient ok to defer pap today  Screening for STD (sexually transmitted disease)  -     Hepatitis B surface antigen; Future  -     Hepatitis C antibody; Future  -     HIV 1/2 Antigen/Antibody (4th Generation) w Reflex [facility]; Future  -     RPR; Future  -     Chlamydia/GC amplified DNA by PCR  Encourage safe sexual practices; STD testing - C/G culture collected off the urine and STD labs ordered    Acne vulgaris  -     etonogestrel-ethinyl estradiol (NUVARING) 0 12-0 015 MG/24HR vaginal ring; INSERT 1 RING VAGINALLY AS DIRECTED  REMOVE AFTER 3 WEEKS & WAIT 7 DAYS BEFORE INSERTING A NEW RING  Menorrhagia with regular cycle  -     etonogestrel-ethinyl estradiol (NUVARING) 0 12-0 015 MG/24HR vaginal ring; INSERT 1 RING VAGINALLY AS DIRECTED  REMOVE AFTER 3 WEEKS & WAIT 7 DAYS BEFORE INSERTING A NEW RING  Contraception - continue nuva ring as directed    Discussion  I have discussed the importance of monthly self-breast exams, exercise and healthy diet as well as adequate intake of calcium and vitamin D  Encourage MVI q day and r/kaiser importance of folic acid; Encourage 30-40 min weight bearing exercise most days of week  The patient has had the Gardasil vaccine series, which is recommended for patients from 545 years of age     All questions have been answered to her satisfaction  RTO for APE or sooner if needed    Subjective     HPI   Chavo De La Torre is a 30 y o  female who presents for annual well woman exam    Menarche - 11/12; LMP - 9/3/20; Patient on the nuva ring for acne/PCOS and s/p gastric sleeve  Periods are reg q month and last 3 days; No excessive bleeding; No intermenstrual bleeding or spotting; Cramps are tolerable  No vulvar itch/burn; No vaginal itch/burn; No abn discharge or odor; No urinary sx - burning/pain/frequency/hematuria  (+) SBEs - no breast masses, asymmetry, nipple discharge or bleeding, changes in skin of breast, or breast tenderness bilaterally  No abd/pelvic pain or HAs;   Pt is sexually active in a mutually monog sexual relationship x 2 months; No issues with intercourse; She ok to have std/hiv/hep testing; Feels safe at home  Current contraception: nuva ring  Condom use: yes sometimes  Gardasil - per patient received x 3  (+) PCP for routine Bw/care; Last Pap - 11/26/19 WNL (-) HRHPV type 16/18 negative ECC benign (Dr Rock Baker); 9/17/19 - WNL (-) HRHPV type 16/18 negative  History of abnormal Pap smear: yes - history of SALTY 2/3 - follows with gyn onc now yearly - next pap/colpo scheduled for 12/2020  Last STD screen - 9/17/19 - C/G negative    Review of Systems   Constitutional: Negative for activity change, fatigue, fever and unexpected weight change  HENT: Negative for congestion, dental problem, sinus pressure and sinus pain  Eyes: Negative for visual disturbance  Respiratory: Negative for cough, shortness of breath and wheezing  Cardiovascular: Negative for chest pain and leg swelling  Gastrointestinal: Negative for abdominal distention, abdominal pain, blood in stool, constipation, diarrhea, nausea and vomiting  Endocrine: Negative for polydipsia  Genitourinary: Negative for difficulty urinating, dyspareunia, dysuria, frequency, hematuria, menstrual problem, pelvic pain, urgency, vaginal bleeding, vaginal discharge and vaginal pain  Musculoskeletal: Negative for arthralgias and back pain  Allergic/Immunologic: Negative for environmental allergies  Neurological: Negative for dizziness, seizures and headaches  Psychiatric/Behavioral: Negative for dysphoric mood and sleep disturbance  The patient is not nervous/anxious          The following portions of the patient's history were reviewed and updated as appropriate: allergies, current medications, past family history, past medical history, past social history, past surgical history and problem list          OB History        0    Para   0    Term   0       0    AB   0    Living   0       SAB   0    TAB   0    Ectopic   0    Multiple   0    Live Births   0                 Past Medical History:   Diagnosis Date    Abnormal Pap smear of cervix      - HSIL/CIN2-3    Acne     Asthma     exercise induced asthma/no recent problems    Bariatric surgery status     Chronic thoracic back pain 2017    Eczema     Exercises 1 to 2 times per week     occas 3x/week    External hemorrhoids     last assessed 8/15/12; resolved 14    Fatigue     last assessed 13; resolved 14   [facility] discharge follow-up 2019    HPV (human papilloma virus) infection     2019 (+) HRHPV type 16/18 negative    Obesity     PCOS (polycystic ovarian syndrome)     Postsurgical malabsorption        Past Surgical History:   Procedure Laterality Date    EGD      [place] LAP, DEANDRE RESTRICT PROC, LONGITUDINAL GASTRECTOMY N/A 2019    Procedure: GASTRECTOMY LAPAROSCOPIC SLEEVE WITH ROBOTICS;  Surgeon: Silvana Fulton MD;  Location: [facility];  Service: Bariatrics    [place] REDUCTION OF LARGE BREAST Bilateral 2018    Procedure: BREAST REDUCTION;  Surgeon: Gianna Cottrell MD;  Location: [facility];  Service: Plastics    REDUCTION MAMMAPLASTY      WISDOM TOOTH EXTRACTION         Family History   Problem Relation Age of Onset    Clotting disorder Mother         with menstration    Diabetes Father     Breast cancer Maternal Aunt     Cancer Maternal Aunt         breast cancer    Hypertension Neg Hx     Heart disease Neg Hx     Thyroid disease Neg Hx     Stroke Neg Hx        Social History     Socioeconomic History    Marital status: Single     Spouse name: Not on file    Number of children: Not on file    Years of education: Not on file    Highest education level: Not on file   Occupational History    Not on file   Social Needs    Financial resource strain: Not on file    Food insecurity     Worry: Not on file     Inability: Not on file    Transportation needs     Medical: Not on file     Non-medical: Not on file   Tobacco Use    Smoking status: Never Smoker    Smokeless tobacco: Never Used   Substance and Sexual Activity    Alcohol use: Yes     Comment: social    Drug use: No    Sexual activity: Yes     Partners: Male     Birth control/protection: OCP   Lifestyle    Physical activity     Days per week: Not on file     Minutes per session: Not on file    Stress: Not on file   Relationships    Social connections     Talks on phone: Not on file     Gets together: Not on file     Attends Sikh service: Not on file     Active member of club or organization: Not on file     Attends meetings of clubs or organizations: Not on file     Relationship status: Not on file    Intimate partner violence     Fear of current or ex partner: Not on file     Emotionally abused: Not on file     Physically abused: Not on file     Forced sexual activity: Not on file   Other Topics Concern    Not on file   Social History Narrative    Not on file         Current Outpatient Medications:     albuterol (Ventolin HFA) 90 mcg/act inhaler, Inhale 2 puffs every 6 (six) hours as needed for wheezing, Disp: 1 Inhaler, Rfl: 1    clindamycin-benzoyl peroxide (BENZACLIN) gel, APPLY TO FACE ONCE TO TWICE DAILY AS NEEDED FOR SPOT TX FOR FLARES, Disp: , Rfl: 1    etonogestrel-ethinyl estradiol (NUVARING) 0 12-0 015 MG/24HR vaginal ring, INSERT 1 RING VAGINALLY AS DIRECTED   REMOVE AFTER 3 WEEKS & WAIT 7 DAYS BEFORE INSERTING A NEW RING, Disp: 3 each, Rfl: 2    spironolactone (ALDACTONE) 100 mg tablet, Take 1 tablet (100 mg total) by mouth daily Take with food  , Disp: 30 tablet, Rfl: 2    tretinoin (REFISSA) 0 05 % cream, Apply topically daily at bedtime, Disp: 45 g, Rfl: 5    spironolactone (ALDACTONE) 50 mg tablet, Take 3 tablets (150 mg total) by mouth daily, Disp: 90 tablet, Rfl: 3    No Known Allergies    Objective   Vitals:    09/29/20 0808   BP: 116/80   BP Location: Left arm   Patient Position: Sitting   Cuff Size: Standard   Temp: (!) 97 1 °F (36 2 °C)   Weight: 71 2 kg (157 lb)   Height: 5' 3" (1 6 m)     Physical Exam  Vitals signs reviewed  Constitutional:       General: She is awake  She is not in acute distress  Appearance: Normal appearance  She is well-developed and well-groomed  She is not diaphoretic  Eyes:      Conjunctiva/sclera: Conjunctivae normal    Neck:      Thyroid: No thyroid mass, thyromegaly or thyroid tenderness  Cardiovascular:      Rate and Rhythm: Normal rate and regular rhythm  Heart sounds: Normal heart sounds  No murmur  Pulmonary:      Effort: Pulmonary effort is normal  No tachypnea, bradypnea or respiratory distress  Breath sounds: Normal breath sounds  No stridor or decreased air movement  No wheezing  Chest:      Breasts: Breasts are symmetrical          Right: Normal  No swelling, bleeding, inverted nipple, mass, nipple discharge, skin change or tenderness  Left: Normal  No swelling, bleeding, inverted nipple, mass, nipple discharge, skin change or tenderness  Abdominal:      General: There is no distension  Palpations: Abdomen is soft  There is no hepatomegaly, splenomegaly or mass  Tenderness: There is no abdominal tenderness  Hernia: No hernia is present  There is no hernia in the left inguinal area or right inguinal area  Genitourinary:     General: Normal vulva  Exam position: Supine  Pubic Area: No rash or pubic lice  Labia:         Right: No rash, tenderness, lesion or injury  Left: No rash, tenderness, lesion or injury  Urethra: No prolapse, urethral pain, urethral swelling or urethral lesion  Vagina: Normal  No signs of injury and foreign body  No vaginal discharge, erythema, tenderness, bleeding, lesions or prolapsed vaginal walls  Cervix: No cervical motion tenderness, discharge, friability, lesion, erythema or cervical bleeding  Uterus: Not deviated, not enlarged, not fixed, not tender and no uterine prolapse  Adnexa:         Right: No mass, tenderness or fullness  Left: No mass, tenderness or fullness  Lymphadenopathy:      Cervical: No cervical adenopathy  Upper Body:      Right upper body: No supraclavicular or axillary adenopathy  Left upper body: No supraclavicular or axillary adenopathy  Lower Body: No right inguinal adenopathy  No left inguinal adenopathy  Skin:     General: Skin is warm and dry  Neurological:      Mental Status: She is alert and oriented to person, place, and time  Psychiatric:         Mood and Affect: Mood and affect normal          Speech: Speech normal          Behavior: Behavior normal  Behavior is cooperative  Thought Content:  Thought content normal          Judgment: Judgment normal

## 2020-09-29 NOTE — ASSESSMENT & PLAN NOTE
The current ASCCP guidelines were reviewed  Patient's last pap was 11/26/19 WNL (-) HRHPV type 16/18 negative ECC benign (Dr Susy Chirinos); 9/17/19 - WNL (-) HRHPV type 16/18 negative and therefore, a pap with HPV cotesting is not indicated at this time - patient has a follow-up colpo with Dr Susy Chirinos 12/2020  I emphasized the importance of an annual pelvic and breast exam  Patient ok to defer pap today

## 2020-10-02 LAB
C TRACH DNA SPEC QL NAA+PROBE: NEGATIVE
N GONORRHOEA DNA SPEC QL NAA+PROBE: NEGATIVE

## 2020-10-09 DIAGNOSIS — L70.8 OTHER ACNE: ICD-10-CM

## 2020-10-09 RX ORDER — SPIRONOLACTONE 50 MG/1
TABLET, FILM COATED ORAL
Qty: 90 TABLET | Refills: 3 | OUTPATIENT
Start: 2020-10-09

## 2020-10-13 ENCOUNTER — OFFICE VISIT (OUTPATIENT)
Dept: FAMILY MEDICINE CLINIC | Facility: CLINIC | Age: 25
End: 2020-10-13
Payer: COMMERCIAL

## 2020-10-13 VITALS
HEART RATE: 64 BPM | SYSTOLIC BLOOD PRESSURE: 104 MMHG | BODY MASS INDEX: 27.64 KG/M2 | DIASTOLIC BLOOD PRESSURE: 80 MMHG | RESPIRATION RATE: 13 BRPM | TEMPERATURE: 97.3 F | WEIGHT: 156 LBS | OXYGEN SATURATION: 99 % | HEIGHT: 63 IN

## 2020-10-13 DIAGNOSIS — Z23 ENCOUNTER FOR IMMUNIZATION: ICD-10-CM

## 2020-10-13 DIAGNOSIS — Z00.00 ANNUAL PHYSICAL EXAM: Primary | ICD-10-CM

## 2020-10-13 PROCEDURE — 99395 PREV VISIT EST AGE 18-39: CPT | Performed by: FAMILY MEDICINE

## 2020-10-13 PROCEDURE — 90686 IIV4 VACC NO PRSV 0.5 ML IM: CPT

## 2020-10-13 PROCEDURE — 1036F TOBACCO NON-USER: CPT | Performed by: FAMILY MEDICINE

## 2020-10-13 PROCEDURE — 90471 IMMUNIZATION ADMIN: CPT

## 2020-11-16 ENCOUNTER — OFFICE VISIT (OUTPATIENT)
Dept: FAMILY MEDICINE CLINIC | Facility: CLINIC | Age: 25
End: 2020-11-16
Payer: COMMERCIAL

## 2020-11-16 VITALS
WEIGHT: 160.6 LBS | BODY MASS INDEX: 28.46 KG/M2 | HEART RATE: 67 BPM | TEMPERATURE: 97.8 F | OXYGEN SATURATION: 97 % | HEIGHT: 63 IN | RESPIRATION RATE: 13 BRPM | DIASTOLIC BLOOD PRESSURE: 80 MMHG | SYSTOLIC BLOOD PRESSURE: 100 MMHG

## 2020-11-16 DIAGNOSIS — R30.0 DYSURIA: Primary | ICD-10-CM

## 2020-11-16 LAB
SL AMB  POCT GLUCOSE, UA: NEGATIVE
SL AMB LEUKOCYTE ESTERASE,UA: NEGATIVE
SL AMB POCT BILIRUBIN,UA: NEGATIVE
SL AMB POCT BLOOD,UA: NEGATIVE
SL AMB POCT CLARITY,UA: CLEAR
SL AMB POCT COLOR,UA: YELLOW
SL AMB POCT KETONES,UA: NEGATIVE
SL AMB POCT NITRITE,UA: NEGATIVE
SL AMB POCT PH,UA: 6
SL AMB POCT SPECIFIC GRAVITY,UA: 1.02
SL AMB POCT URINE PROTEIN: NEGATIVE
SL AMB POCT UROBILINOGEN: NEGATIVE

## 2020-11-16 PROCEDURE — 81002 URINALYSIS NONAUTO W/O SCOPE: CPT | Performed by: FAMILY MEDICINE

## 2020-11-16 PROCEDURE — 3008F BODY MASS INDEX DOCD: CPT | Performed by: FAMILY MEDICINE

## 2020-11-16 PROCEDURE — 87086 URINE CULTURE/COLONY COUNT: CPT | Performed by: FAMILY MEDICINE

## 2020-11-16 PROCEDURE — 1036F TOBACCO NON-USER: CPT | Performed by: FAMILY MEDICINE

## 2020-11-16 PROCEDURE — 99213 OFFICE O/P EST LOW 20 MIN: CPT | Performed by: FAMILY MEDICINE

## 2020-11-17 ENCOUNTER — TELEPHONE (OUTPATIENT)
Dept: DERMATOLOGY | Facility: CLINIC | Age: 25
End: 2020-11-17

## 2020-11-17 LAB — BACTERIA UR CULT: NORMAL

## 2020-12-03 ENCOUNTER — TELEPHONE (OUTPATIENT)
Dept: HEMATOLOGY ONCOLOGY | Facility: CLINIC | Age: 25
End: 2020-12-03

## 2020-12-03 ENCOUNTER — TELEMEDICINE (OUTPATIENT)
Dept: DERMATOLOGY | Age: 25
End: 2020-12-03
Payer: COMMERCIAL

## 2020-12-03 DIAGNOSIS — L70.0 ACNE VULGARIS: Primary | ICD-10-CM

## 2020-12-03 DIAGNOSIS — L70.8 OTHER ACNE: ICD-10-CM

## 2020-12-03 PROCEDURE — 99213 OFFICE O/P EST LOW 20 MIN: CPT

## 2020-12-03 PROCEDURE — 99999 PR OFFICE/OUTPT VISIT,PROCEDURE ONLY: CPT | Performed by: DERMATOLOGY

## 2020-12-03 RX ORDER — CLINDAMYCIN AND BENZOYL PEROXIDE 10; 50 MG/G; MG/G
GEL TOPICAL
Qty: 25 G | Refills: 1 | Status: SHIPPED | OUTPATIENT
Start: 2020-12-03

## 2020-12-03 RX ORDER — SPIRONOLACTONE 50 MG/1
150 TABLET, FILM COATED ORAL DAILY
Qty: 90 TABLET | Refills: 3 | Status: SHIPPED | OUTPATIENT
Start: 2020-12-03 | End: 2021-05-04

## 2020-12-07 ENCOUNTER — TELEPHONE (OUTPATIENT)
Dept: GYNECOLOGIC ONCOLOGY | Facility: CLINIC | Age: 25
End: 2020-12-07

## 2020-12-10 ENCOUNTER — OFFICE VISIT (OUTPATIENT)
Dept: GYNECOLOGIC ONCOLOGY | Facility: CLINIC | Age: 25
End: 2020-12-10
Payer: COMMERCIAL

## 2020-12-10 VITALS
RESPIRATION RATE: 16 BRPM | WEIGHT: 161.5 LBS | HEART RATE: 82 BPM | DIASTOLIC BLOOD PRESSURE: 68 MMHG | TEMPERATURE: 97.3 F | BODY MASS INDEX: 28.62 KG/M2 | HEIGHT: 63 IN | SYSTOLIC BLOOD PRESSURE: 100 MMHG

## 2020-12-10 DIAGNOSIS — N87.1 MODERATE DYSPLASIA OF CERVIX (CIN II): Primary | ICD-10-CM

## 2020-12-10 PROCEDURE — 88305 TISSUE EXAM BY PATHOLOGIST: CPT | Performed by: PATHOLOGY

## 2020-12-10 PROCEDURE — 3008F BODY MASS INDEX DOCD: CPT | Performed by: FAMILY MEDICINE

## 2020-12-10 PROCEDURE — 57455 BIOPSY OF CERVIX W/SCOPE: CPT | Performed by: OBSTETRICS & GYNECOLOGY

## 2020-12-10 PROCEDURE — 88344 IMHCHEM/IMCYTCHM EA MLT ANTB: CPT | Performed by: PATHOLOGY

## 2020-12-10 PROCEDURE — 87624 HPV HI-RISK TYP POOLED RSLT: CPT | Performed by: OBSTETRICS & GYNECOLOGY

## 2020-12-10 PROCEDURE — 88175 CYTOPATH C/V AUTO FLUID REDO: CPT | Performed by: OBSTETRICS & GYNECOLOGY

## 2020-12-11 ENCOUNTER — TELEPHONE (OUTPATIENT)
Dept: FAMILY MEDICINE CLINIC | Facility: CLINIC | Age: 25
End: 2020-12-11

## 2020-12-16 LAB
LAB AP GYN PRIMARY INTERPRETATION: NORMAL
Lab: NORMAL

## 2020-12-17 ENCOUNTER — TELEPHONE (OUTPATIENT)
Dept: GYNECOLOGIC ONCOLOGY | Facility: CLINIC | Age: 25
End: 2020-12-17

## 2020-12-23 ENCOUNTER — TELEPHONE (OUTPATIENT)
Dept: FAMILY MEDICINE CLINIC | Facility: CLINIC | Age: 25
End: 2020-12-23

## 2020-12-23 ENCOUNTER — TELEPHONE (OUTPATIENT)
Dept: DERMATOLOGY | Facility: CLINIC | Age: 25
End: 2020-12-23

## 2021-01-06 ENCOUNTER — TELEMEDICINE (OUTPATIENT)
Dept: FAMILY MEDICINE CLINIC | Facility: CLINIC | Age: 26
End: 2021-01-06
Payer: COMMERCIAL

## 2021-01-06 DIAGNOSIS — Z86.16 HISTORY OF COVID-19: ICD-10-CM

## 2021-01-06 DIAGNOSIS — J45.20 MILD INTERMITTENT EXTRINSIC ASTHMA WITHOUT COMPLICATION: Primary | ICD-10-CM

## 2021-01-06 PROCEDURE — 1036F TOBACCO NON-USER: CPT | Performed by: NURSE PRACTITIONER

## 2021-01-06 PROCEDURE — 99213 OFFICE O/P EST LOW 20 MIN: CPT | Performed by: NURSE PRACTITIONER

## 2021-01-06 RX ORDER — METHYLPREDNISOLONE 4 MG/1
TABLET ORAL
Qty: 21 EACH | Refills: 0 | Status: SHIPPED | OUTPATIENT
Start: 2021-01-06 | End: 2021-05-21

## 2021-01-06 NOTE — PROGRESS NOTES
Virtual Regular Visit      Assessment/Plan:    Problem List Items Addressed This Visit        Respiratory    Extrinsic asthma without complication - Primary    Relevant Medications    methylPREDNISolone 4 MG tablet therapy pack       Other    History of COVID-19      monitor symptoms      BMI Counseling: There is no height or weight on file to calculate BMI  The BMI is above normal  Nutrition recommendations include decreasing portion sizes, encouraging healthy choices of fruits and vegetables, decreasing fast food intake, consuming healthier snacks, limiting drinks that contain sugar, moderation in carbohydrate intake, increasing intake of lean protein, reducing intake of saturated and trans fat and reducing intake of cholesterol  Exercise recommendations include moderate physical activity 150 minutes/week, exercising 3-5 times per week and strength training exercises  No pharmacotherapy was ordered  Reason for visit is   Chief Complaint   Patient presents with    Virtual Regular Visit        Encounter provider MARIZA Morales    Provider located at [address] [address]      Recent Visits  No visits were found meeting these conditions  Showing recent visits within past 7 days and meeting all other requirements     Today's Visits  Date Type Provider Dept   01/06/21 Telemedicine MARIZA Morales [facility]   Showing today's visits and meeting all other requirements     Future Appointments  No visits were found meeting these conditions  Showing future appointments within next 150 days and meeting all other requirements        The patient was identified by name and date of birth  Tobias Millsum was informed that this is a telemedicine visit and that the visit is being conducted through [facility] and patient was informed that this is not a secure, HIPAA-compliant platform  She agrees to proceed     My office door was closed  No one else was in the room  She acknowledged consent and understanding of privacy and security of the video platform  The patient has agreed to participate and understands they can discontinue the visit at any time  Patient is aware this is a billable service  Subjective  Alec Meneses is a 30 y o  female          Follow up to covid  Tested on dec 11th for covid and was positive  Very mild symptoms  Lost taste and smell and came back quickly  Started to become sob Wednesday  Hx of exercise induced asthma  Up and down with sob  Using rescue inhaler still  Does not feel like she is getting full deep breath  Worse with eating  Hx of gastric sleeve           Past Medical History:   Diagnosis Date    Abnormal Pap smear of cervix     2018/2019 - HSIL/CIN2-3    Acne     Asthma     exercise induced asthma/no recent problems    Bariatric surgery status     Chronic thoracic back pain 11/9/2017    Eczema     Exercises 1 to 2 times per week     occas 3x/week    External hemorrhoids     last assessed 8/15/12; resolved 6/13/14    Fatigue     last assessed 4/8/13; resolved 6/13/14   [facility] follow[facility] 8/19/2019    HPV (human papilloma virus) infection     2019 (+) HRHPV type 16/18 negative    Obesity     PCOS (polycystic ovarian syndrome)     Postsurgical malabsorption        Past Surgical History:   Procedure Laterality Date    EGD      WI BREAST REDUCTION Bilateral 2/22/2018    Procedure: BREAST REDUCTION;  Surgeon: Jodi Sanderson MD;  [facility];  Service: Plastics    WI LAP, DEANDRE RESTRICT PROC, LONGITUDINAL GASTRECTOMY N/A 8/13/2019    Procedure: GASTRECTOMY LAPAROSCOPIC SLEEVE WITH ROBOTICS;  Surgeon: Chet Murrieta MD;  [facility];  Service: Bariatrics    REDUCTION MAMMAPLASTY      WISDOM TOOTH EXTRACTION         Current Outpatient Medications   Medication Sig Dispense Refill    albuterol (Ventolin HFA) 90 mcg/act inhaler Inhale 2 puffs every 6 (six) hours as needed for wheezing 1 Inhaler 1    clindamycin-benzoyl peroxide (BENZACLIN) gel Apply to face twice daily for acne 25 g 1    etonogestrel-ethinyl estradiol (NUVARING) 0 12-0 015 MG/24HR vaginal ring INSERT 1 RING VAGINALLY AS DIRECTED  REMOVE AFTER 3 WEEKS & WAIT 7 DAYS BEFORE INSERTING A NEW RING 3 each 2    methylPREDNISolone 4 MG tablet therapy pack Use as directed on package 21 each 0    spironolactone (ALDACTONE) 50 mg tablet Take 3 tablets (150 mg total) by mouth daily 90 tablet 3     No current facility-administered medications for this visit  No Known Allergies    Review of Systems   Constitutional: Positive for fatigue and fever  HENT: Negative for congestion and postnasal drip  Respiratory: Positive for chest tightness and shortness of breath  Negative for wheezing  Cardiovascular: Negative for chest pain and palpitations  Gastrointestinal: Negative for constipation and diarrhea  Musculoskeletal: Negative for arthralgias and myalgias  Skin: Negative for rash  Neurological: Negative for dizziness and headaches  Psychiatric/Behavioral: Negative for dysphoric mood and sleep disturbance  The patient is not nervous/anxious  Video Exam    There were no vitals filed for this visit  Physical Exam  Constitutional:       Appearance: Normal appearance  HENT:      Head: Normocephalic and atraumatic  Eyes:      Conjunctiva/sclera: Conjunctivae normal    Pulmonary:      Effort: Pulmonary effort is normal    Musculoskeletal: Normal range of motion  Neurological:      Mental Status: She is alert and oriented to person, place, and time     Psychiatric:         Mood and Affect: Mood normal          Behavior: Behavior normal           I spent 15 minutes with patient today in which greater than 50% of the time was spent in counseling/coordination of care regarding sob, hx of covid [address] acknowledges that she has consented to an online visit or consultation  She understands that the online visit is based solely on information provided by her, and that, in the absence of a face-to-face physical evaluation by the physician, the diagnosis she receives is both limited and provisional in terms of accuracy and completeness  This is not intended to replace a full medical face-to-face evaluation by the physician  Charles Patrick understands and accepts these terms

## 2021-01-18 ENCOUNTER — TELEPHONE (OUTPATIENT)
Dept: BARIATRICS | Facility: CLINIC | Age: 26
End: 2021-01-18

## 2021-01-18 NOTE — TELEPHONE ENCOUNTER
----- Message from Taylor Azar RN sent at 2021 10:47 AM EST -----  Regardin year f/u appointment  Please contact patient to schedule a 1 year follow up appointment    Thank You

## 2021-02-17 ENCOUNTER — TELEMEDICINE (OUTPATIENT)
Dept: DERMATOLOGY | Age: 26
End: 2021-02-17
Payer: COMMERCIAL

## 2021-02-17 DIAGNOSIS — L21.9 SEBORRHEA: Primary | ICD-10-CM

## 2021-02-17 PROCEDURE — 99213 OFFICE O/P EST LOW 20 MIN: CPT | Performed by: DERMATOLOGY

## 2021-02-17 PROCEDURE — 1036F TOBACCO NON-USER: CPT | Performed by: DERMATOLOGY

## 2021-02-17 NOTE — PROGRESS NOTES
Virtual Regular Visit      Assessment/Plan:    Problem List Items Addressed This Visit     None      Visit Diagnoses     Seborrhea    -  Primary             Reason for visit is   Chief Complaint   Patient presents with    Virtual Regular Visit        Encounter provider Vinita Easton MD    Provider located at [place] [id number] [phone]      Recent Visits  No visits were found meeting these conditions  Showing recent visits within past 7 days and meeting all other requirements     Today's Visits  Date Type Provider Dept   02/17/21 Telemedicine Rekha Riley MD [facility]   Showing today's visits and meeting all other requirements     Future Appointments  No visits were found meeting these conditions  Showing future appointments within next 150 days and meeting all other requirements        The patient was identified by name and date of birth  Gail Rae was informed that this is a telemedicine visit and that the visit is being conducted through [facility] and patient was informed that this is a secure, HIPAA-compliant platform  She agrees to proceed     My office door was closed  The patient was notified the following individuals were present in the room Brando  She acknowledged consent and understanding of privacy and security of the video platform  The patient has agreed to participate and understands they can discontinue the visit at any time  Patient is aware this is a billable service  Subjective  Gail Rae is a 30 y o  female virtually present for concerns of eyelids        HPI     Past Medical History:   Diagnosis Date    Abnormal Pap smear of cervix     2018/2019 - HSIL/CIN2-3    Acne     Asthma     exercise induced asthma/no recent problems    Bariatric surgery status     Chronic thoracic back pain 11/9/2017    Eczema     Exercises 1 to 2 times per week     occas 3x/week    External hemorrhoids     last assessed 8/15/12; resolved 6/13/14    Fatigue     last assessed 4/8/13; resolved 6/13/14   [facility] discharge follow[facility]up 8/19/2019    HPV (human papilloma virus) infection     2019 (+) HRHPV type 16/18 negative    Obesity     PCOS (polycystic ovarian syndrome)     Postsurgical malabsorption        Past Surgical History:   Procedure Laterality Date    EGD      SD BREAST REDUCTION Bilateral 2/22/2018    Procedure: BREAST REDUCTION;  Surgeon: Ashleigh Acevedo MD;  [facility];  Service: Plastics    SD LAP, DEANDRE RESTRICT PROC, LONGITUDINAL GASTRECTOMY N/A 8/13/2019    Procedure: GASTRECTOMY LAPAROSCOPIC SLEEVE WITH ROBOTICS;  Surgeon: Yanique Thomson MD;  [facility];  Service: Bariatrics    REDUCTION MAMMAPLASTY      WISDOM TOOTH EXTRACTION         Current Outpatient Medications   Medication Sig Dispense Refill    albuterol (Ventolin HFA) 90 mcg/act inhaler Inhale 2 puffs every 6 (six) hours as needed for wheezing 1 Inhaler 1    clindamycin-benzoyl peroxide (BENZACLIN) gel Apply to face twice daily for acne 25 g 1    etonogestrel-ethinyl estradiol (NUVARING) 0 12-0 015 MG/24HR vaginal ring INSERT 1 RING VAGINALLY AS DIRECTED  REMOVE AFTER 3 WEEKS & WAIT 7 DAYS BEFORE INSERTING A NEW RING 3 each 2    methylPREDNISolone 4 MG tablet therapy pack Use as directed on package 21 each 0    spironolactone (ALDACTONE) 50 mg tablet Take 3 tablets (150 mg total) by mouth daily 90 tablet 3     No current facility-administered medications for this visit  No Known Allergies    Review of Systems    Video Exam    Vitals:       Physical Exam     I spent 8 minutes directly with the patient during this visit      VIRTUAL VISIT DISCLAIMER    Ana Koo acknowledges that she has consented to an online visit or consultation   She understands that the online visit is based solely on information provided by her, and that, in the absence of a face-to-face physical evaluation by the physician, the diagnosis she receives is both limited and provisional in terms of accuracy and completeness  This is not intended to replace a full medical face-to-face evaluation by the physician  Billy Ramsay understands and accepts these terms  [facility] Note    Patient Name: Billy Ramsay  Encounter Date: 2/17/2021    Today's Chief Concerns:  Oneida Rowan Concern #1:  Eyelid concern      Current Medications:    Current Outpatient Medications:     albuterol (Ventolin HFA) 90 mcg/act inhaler, Inhale 2 puffs every 6 (six) hours as needed for wheezing, Disp: 1 Inhaler, Rfl: 1    clindamycin-benzoyl peroxide (BENZACLIN) gel, Apply to face twice daily for acne, Disp: 25 g, Rfl: 1    etonogestrel-ethinyl estradiol (NUVARING) 0 12-0 015 MG/24HR vaginal ring, INSERT 1 RING VAGINALLY AS DIRECTED  REMOVE AFTER 3 WEEKS & WAIT 7 DAYS BEFORE INSERTING A NEW RING, Disp: 3 each, Rfl: 2    methylPREDNISolone 4 MG tablet therapy pack, Use as directed on package, Disp: 21 each, Rfl: 0    spironolactone (ALDACTONE) 50 mg tablet, Take 3 tablets (150 mg total) by mouth daily, Disp: 90 tablet, Rfl: 3    CONSTITUTIONAL:   Vitals:     unable to obtain, virtual    Specific Alerts:    Have you been seen by a [facility] Dermatologist in the last 3 years? YES    Are you pregnant or planning to become pregnant? No    Are you currently or planning to be nursing or breast feeding? No    No Known Allergies    May we call your Preferred Phone number to discuss your specific medical information? YES    May we leave a detailed message that includes your specific medical information? YES    Have you traveled outside of the [facility] in the past 3 months? No    Do you currently have a pacemaker or defibrillator? No    Do you have any artificial heart valves, joints, plates, screws, rods, stents, pins, etc? No    Do you require any medications prior to a surgical procedure? No    Are you taking any medications that cause you to bleed more easily ("blood thinners") No    Have you ever experienced a rapid heartbeat with epinephrine? No    [facility] can help with wrinkles, "laugh lines," facial volume loss, "double chin," "love handles," age spots, and more  Are you interested in learning today about some of the skin enhancement procedures that we offer? (If Yes, please provide more detail) No    Review of Systems:  Have you recently had or currently have any of the following? · Fever or chills: No  · Night Sweats: No  · Headaches: No  · Weight Gain: No  · Weight Loss: No  · Blurry Vision: No  · Nausea: No  · Vomiting: No  · Diarrhea: No  · Blood in Stool: No  · Abdominal Pain: No  · Itchy Skin: YES  · Painful Joints: No  · Swollen Joints: No  · Muscle Pain: No  · Irregular Mole: No  · Sun Burn: No  · Dry Skin: YES  · Skin Color Changes: No  · Scar or Keloid: No  · Cold Sores/Fever Blisters: No  · Bacterial Infections/MRSA: No  · Anxiety: No  · Depression: No  · Suicidal or Homicidal Thoughts: No      PSYCH: Normal mood and affect  EYES: Normal conjunctiva  ENT: Normal lips and oral mucosa  CARDIOVASCULAR: No edema  RESPIRATORY: Normal respirations  HEME/LYMPH/IMMUNO:  No regional lymphadenopathy except as noted below in ASSESSMENT AND PLAN BY DIAGNOSIS    FULL ORGAN SYSTEM SKIN EXAM (SKIN)   Face Normal except as noted below in Assessment       1  SEBORRHEA  Physical Exam:   Anatomic Location Affected:  Upper eyelids   Morphological Description:  Erythema with fine scale   Pertinent Positives:   Pertinent Negatives: Additional History of Present Condition:  Patient states she has had similar symptoms in the past  It has flared up over the last 4 weeks  She has been using Aquaphor to moisturize the area  It gives her temporary relief      Assessment and Plan:  Based on a thorough discussion of this condition and the management approach to it (including a comprehensive discussion of the known risks, side effects and potential benefits of treatment), the patient (family) agrees to implement the following specific plan:   Apply hydrocortisone 2 5% ointment twice daily to eyelids for about 2 weeks   Follow up in 2 weeks if no improvement    Scribe Attestation    I,:  Shawna Lowry am acting as a scribe while in the presence of the attending physician :       I,:  Nii Aly MD personally performed the services described in this documentation    as scribed in my presence :

## 2021-02-17 NOTE — PATIENT INSTRUCTIONS
SEBORRHEA    Assessment and Plan:  Based on a thorough discussion of this condition and the management approach to it (including a comprehensive discussion of the known risks, side effects and potential benefits of treatment), the patient (family) agrees to implement the following specific plan:   Apply hydrocortisone 2 5% ointment twice daily to eyelids for about 2 weeks   Follow up in 2 weeks if no improvement

## 2021-02-26 ENCOUNTER — TELEPHONE (OUTPATIENT)
Dept: FAMILY MEDICINE CLINIC | Facility: CLINIC | Age: 26
End: 2021-02-26

## 2021-04-21 ENCOUNTER — OFFICE VISIT (OUTPATIENT)
Dept: BARIATRICS | Facility: CLINIC | Age: 26
End: 2021-04-21
Payer: COMMERCIAL

## 2021-04-21 VITALS
DIASTOLIC BLOOD PRESSURE: 64 MMHG | RESPIRATION RATE: 16 BRPM | TEMPERATURE: 98.6 F | BODY MASS INDEX: 30.48 KG/M2 | WEIGHT: 172 LBS | HEIGHT: 63 IN | HEART RATE: 84 BPM | SYSTOLIC BLOOD PRESSURE: 102 MMHG

## 2021-04-21 DIAGNOSIS — Z48.815 ENCOUNTER FOR SURGICAL AFTERCARE FOLLOWING SURGERY OF DIGESTIVE SYSTEM: Primary | ICD-10-CM

## 2021-04-21 DIAGNOSIS — E66.9 OBESITY, CLASS I, BMI 30-34.9: ICD-10-CM

## 2021-04-21 DIAGNOSIS — E28.2 PCOS (POLYCYSTIC OVARIAN SYNDROME): ICD-10-CM

## 2021-04-21 DIAGNOSIS — Z98.84 BARIATRIC SURGERY STATUS: Chronic | ICD-10-CM

## 2021-04-21 DIAGNOSIS — K91.2 POSTSURGICAL MALABSORPTION: Chronic | ICD-10-CM

## 2021-04-21 PROCEDURE — 1036F TOBACCO NON-USER: CPT | Performed by: PHYSICIAN ASSISTANT

## 2021-04-21 PROCEDURE — 99214 OFFICE O/P EST MOD 30 MIN: CPT | Performed by: PHYSICIAN ASSISTANT

## 2021-04-21 NOTE — PATIENT INSTRUCTIONS
· Follow-up with RD and 1 year for annual  We kindly ask that your arrive 15 minutes before your scheduled appointment time with your provider to allow our staff to room you, get your vital signs and update your chart  · Get lab work done  Please call the office if you need a script  It is recommended to check with your insurance BEFORE getting labs done to make sure they are covered by your policy  · Call our office if you have any problems with abdominal pain especially associated with fever, chills, nausea, vomiting or any other concerns  · All  Post-bariatric surgery patients should be aware that very small quantities of any alcohol can cause impairment and it is very possible not to feel the effect  The effect can be in the system for several hours  It is also a stomach irritant  · It is advised to AVOID alcohol, Nonsteroidal antiinflammatory drugs (NSAIDS) and nicotine of all forms   Any of these can cause stomach irritation/pain  · Discussed the effects of alcohol on a bariatric patient and the increased impairment risk  · Keep up the good work!

## 2021-04-29 NOTE — PROGRESS NOTES
Bariatric Follow Up Nutrition Note    Type of surgery  Vertical sleeve gastrectomy  Surgery Date: 8/13/2019  20 months  post-op  Surgeon: Dr Gregoria Escoto  30 y o   female  Ht 5' 3" (1 6 m)   Wt 77 2 kg (170 lb 3 2 oz)   BMI 30 15 kg/m²       Wt Readings from Last 3 Encounters:   04/21/21 78 kg (172 lb)   12/10/20 73 3 kg (161 lb 8 oz)   11/16/20 72 8 kg (160 lb 9 6 oz)       Kem- St Solo Equation:  1484 kcal   Estimated calories for weight loss 8656-8344 ( 1/2#  To 1# per wk wt loss - sedentary )  Estimated protein needs 64-77 grams(1 0-1 2 gms/kg IBW )   Estimated fluid needs 2245 ml (35 ml/kg IBW )  75 ounces     Weight on Day of Weight Loss Surgery: 240 3 lbs   Weight in (lb) to have BMI = 25: 141 1#  Post-Op Wt Loss: 74#/ 72% EBWL in 20 month(s)  Sergio weight = 158#    Patient is concerned about 12 pounds of weight regain without any changes in her eating habits or exercise level  She was diagnosed with PCOS by gyn - but does not remember having any androgen blood work , insulin levels or ultrasound to determine if she truly had PCOS  Diagnosis was based on symtpoms     Take spirolactone as an antiandrogen     Review of History and Medications   Past Medical History:   Diagnosis Date    Abnormal Pap smear of cervix     2018/2019 - HSIL/CIN2-3    Acne     Asthma     exercise induced asthma/no recent problems    Bariatric surgery status     Chronic thoracic back pain 11/9/2017    Eczema     Exercises 1 to 2 times per week     occas 3x/week    External hemorrhoids     last assessed 8/15/12; resolved 6/13/14    Fatigue     last assessed 4/8/13; resolved 6/13/14   [facility]    HPV (human papilloma virus) infection     2019 (+) HRHPV type 16/18 negative    Obesity     PCOS (polycystic ovarian syndrome)     Postsurgical malabsorption      Past Surgical History:   Procedure Laterality Date    EGD      DE BREAST REDUCTION Bilateral 2/22/2018    Procedure: BREAST REDUCTION;  Surgeon: Tone Amaral MD;  [facility];  Service: Plastics    [place] LAP, DEANDRE RESTRICT PROC, LONGITUDINAL GASTRECTOMY N/A 8/13/2019    Procedure: GASTRECTOMY LAPAROSCOPIC SLEEVE WITH ROBOTICS;  Surgeon: Dai White MD;  [facility];  Service: Bariatrics    REDUCTION MAMMAPLASTY      WISDOM TOOTH EXTRACTION       Social History     Socioeconomic History    Marital status: Single     Spouse name: Not on file    Number of children: Not on file    Years of education: Not on file    Highest education level: Not on file   Occupational History    Not on file   Social Needs    Financial resource strain: Not on file    Food insecurity     Worry: Not on file     Inability: Not on file    Transportation needs     Medical: Not on file     Non-medical: Not on file   Tobacco Use    Smoking status: Never Smoker    Smokeless tobacco: Never Used   Substance and Sexual Activity    Alcohol use: Yes     Comment: social    Drug use: No    Sexual activity: Yes     Partners: Male     Birth control/protection: Ring   Lifestyle    Physical activity     Days per week: Not on file     Minutes per session: Not on file    Stress: Not on file   Relationships    Social connections     Talks on phone: Not on file     Gets together: Not on file     Attends Hinduism service: Not on file     Active member of club or organization: Not on file     Attends meetings of clubs or organizations: Not on file     Relationship status: Not on file    Intimate partner violence     Fear of current or ex partner: Not on file     Emotionally abused: Not on file     Physically abused: Not on file     Forced sexual activity: Not on file   Other Topics Concern    Not on file   Social History Narrative    Not on file       Current Outpatient Medications:     albuterol (Ventolin HFA) 90 mcg/act inhaler, Inhale 2 puffs every 6 (six) hours as needed for wheezing, Disp: 1 Inhaler, Rfl: 1    clindamycin-benzoyl peroxide (BENZACLIN) gel, Apply to face twice daily for acne, Disp: 25 g, Rfl: 1    etonogestrel-ethinyl estradiol (NUVARING) 0 12-0 015 MG/24HR vaginal ring, INSERT 1 RING VAGINALLY AS DIRECTED   REMOVE AFTER 3 WEEKS & WAIT 7 DAYS BEFORE INSERTING A NEW RING, Disp: 3 each, Rfl: 2    hydrocortisone 2 5 % ointment, Apply topically 2 (two) times a day, Disp: 30 g, Rfl: 2    methylPREDNISolone 4 MG tablet therapy pack, Use as directed on package (Patient not taking: Reported on 4/21/2021), Disp: 21 each, Rfl: 0    spironolactone (ALDACTONE) 50 mg tablet, Take 3 tablets (150 mg total) by mouth daily, Disp: 90 tablet, Rfl: 3    Food Intake and Lifestyle Assessment   Food Intake Assessment completed via usual diet recall  Breakfast: 8 am Premier protien  Snack: 0   Lunch: 12 noon salad with lean protein   Snack: 3 pm to 4 pm  Kind bar   Dinner: 5-7 pm - lean protein and vegetables   Snack: sometimes popcorn   Beverage intake: water  Diet texture/stage: regular  Protein supplement: one per day   Estimated protein intake per day: 70-80 grams per day   Estimated fluid intake per day: 64 ounces or more per day   Meals eaten away from home: rarely   Typical meal pattern: 3 meals per day and 1-2 snacks per day  Eating Behaviors: Appropriate diet advancement, Appropriate portion sizes and Does not drink with meals and waits 30-minutes after meal before resuming drinking    Food allergies or intolerances: none noted   Cultural or Christianity considerations: n/a     Physical Assessment  Nutrition Related Findings  Had some bloating which resolved with low gluten diet     Physical Activity  Types of exercise: Biking  Walking  Running   Has a [place] - hits HR of 171 during exercise   Current physical limitations: none     Psychosocial Assessment   Support systems: parent(s) sibling(s) friend(s)  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     Nutrition Diagnosis  Diagnosis: Unintentional weight gain   Related to: Altered GI function and diagnosis of PCOS  As Evidenced by: BMI >25     Interventions and Teaching   Patient educated on post-op nutrition guidelines  Patient educated and handouts provided  Surgical changes to stomach / GI  Capacity of post-surgery stomach  Adequate hydration  Expected weight loss  Weight loss plateaus/ possibility of weight regain  Exercise  Nutrition considerations after surgery  Protein supplements  Dietary and lifestyle changes  Techniques for self monitoring and keeping daily food journal  Vitamin / Mineral supplementation of Multivitamin with minerals, Calcium, Vitamin B12, Iron, Fat Soluble vitamins and Vitamin D  Has nuvo ring for birth control and regulation of periods   Discussed Ovasitol for supplementation to assist with possible insulin resistence  Education provided to: patient    Barriers to learning: No barriers identified    Readiness to change: action    Comprehension: demonstrated understanding and verbalizes understanding     Expected Compliance: good    Evaluation/Monitoring   Eating pattern as discussed Tolerance of nutrition prescription Body weight Lab values Physical activity  Patient is following recommended nutritional guidelines for PCOS- consistent carb intake, consistent meal times, daily exercise, vitamin D supplementation        Goals  Food journal, Exercise 30 minutes 5 times per week, Eat 3 meals per day and one planned snack    Schedule appointment with endocrinologist for diagnostic testing for PCOS  Purchase and take Ovasitol ( nain and d- chiro inositol )     Business card provided for any further questions or concerns   Patient will f/u with practice for annual follow up s/p gastric sleeve  Encouraged patient to complete nutritional blood work   Time Spent:   45 Minutes

## 2021-04-30 ENCOUNTER — TELEPHONE (OUTPATIENT)
Dept: FAMILY MEDICINE CLINIC | Facility: CLINIC | Age: 26
End: 2021-04-30

## 2021-04-30 ENCOUNTER — OFFICE VISIT (OUTPATIENT)
Dept: BARIATRICS | Facility: CLINIC | Age: 26
End: 2021-04-30

## 2021-04-30 VITALS — HEIGHT: 63 IN | WEIGHT: 170.2 LBS | BODY MASS INDEX: 30.16 KG/M2

## 2021-04-30 DIAGNOSIS — Z98.84 BARIATRIC SURGERY STATUS: Primary | ICD-10-CM

## 2021-04-30 PROCEDURE — RECHECK: Performed by: DIETITIAN, REGISTERED

## 2021-04-30 PROCEDURE — 3008F BODY MASS INDEX DOCD: CPT | Performed by: PHYSICIAN ASSISTANT

## 2021-04-30 NOTE — PATIENT INSTRUCTIONS
Goals  Food journal, Exercise 30 minutes 5 times per week, Eat 3 meals per day and one planned snack    Schedule appointment with endocrinologist for diagnostic testing for PCOS  Purchase and take Ovasitol ( nain and d- chiro inositol )    Complete annual nutritional blood work

## 2021-04-30 NOTE — TELEPHONE ENCOUNTER
Pt called and is asking for a referral to see a endocrinologist that specializes in PCOS pls advise  I did let her know you were not in until Monday   Thank you

## 2021-04-30 NOTE — TELEPHONE ENCOUNTER
IS there someone is particular she wants to see?  We don't have an endo that specializes only in pcos- they all do it

## 2021-05-02 DIAGNOSIS — E28.2 PCOS (POLYCYSTIC OVARIAN SYNDROME): Primary | ICD-10-CM

## 2021-05-04 DIAGNOSIS — L70.8 OTHER ACNE: ICD-10-CM

## 2021-05-04 RX ORDER — SPIRONOLACTONE 50 MG/1
TABLET, FILM COATED ORAL
Qty: 90 TABLET | Refills: 3 | Status: SHIPPED | OUTPATIENT
Start: 2021-05-04 | End: 2021-09-29

## 2021-05-06 ENCOUNTER — APPOINTMENT (OUTPATIENT)
Dept: LAB | Facility: CLINIC | Age: 26
End: 2021-05-06
Payer: COMMERCIAL

## 2021-05-06 DIAGNOSIS — Z98.84 BARIATRIC SURGERY STATUS: Chronic | ICD-10-CM

## 2021-05-06 DIAGNOSIS — Z11.3 SCREENING FOR STD (SEXUALLY TRANSMITTED DISEASE): ICD-10-CM

## 2021-05-06 DIAGNOSIS — K91.2 POSTSURGICAL MALABSORPTION: Chronic | ICD-10-CM

## 2021-05-06 DIAGNOSIS — Z48.815 ENCOUNTER FOR SURGICAL AFTERCARE FOLLOWING SURGERY OF DIGESTIVE SYSTEM: ICD-10-CM

## 2021-05-06 DIAGNOSIS — E66.9 OBESITY, CLASS I, BMI 30-34.9: ICD-10-CM

## 2021-05-06 LAB
25(OH)D3 SERPL-MCNC: 31.5 NG/ML (ref 30–100)
ALBUMIN SERPL BCP-MCNC: 3.6 G/DL (ref 3.5–5)
ALP SERPL-CCNC: 67 U/L (ref 46–116)
ALT SERPL W P-5'-P-CCNC: 16 U/L (ref 12–78)
ANION GAP SERPL CALCULATED.3IONS-SCNC: 9 MMOL/L (ref 4–13)
AST SERPL W P-5'-P-CCNC: 11 U/L (ref 5–45)
BILIRUB SERPL-MCNC: 0.32 MG/DL (ref 0.2–1)
BUN SERPL-MCNC: 17 MG/DL (ref 5–25)
CALCIUM SERPL-MCNC: 8.5 MG/DL (ref 8.3–10.1)
CHLORIDE SERPL-SCNC: 106 MMOL/L (ref 100–108)
CO2 SERPL-SCNC: 28 MMOL/L (ref 21–32)
CREAT SERPL-MCNC: 0.84 MG/DL (ref 0.6–1.3)
ERYTHROCYTE [DISTWIDTH] IN BLOOD BY AUTOMATED COUNT: 12.7 % (ref 11.6–15.1)
FERRITIN SERPL-MCNC: 51 NG/ML (ref 8–388)
FOLATE SERPL-MCNC: 17.1 NG/ML (ref 3.1–17.5)
GFR SERPL CREATININE-BSD FRML MDRD: 96 ML/MIN/1.73SQ M
GLUCOSE P FAST SERPL-MCNC: 76 MG/DL (ref 65–99)
HBV SURFACE AG SER QL: NORMAL
HCT VFR BLD AUTO: 41 % (ref 34.8–46.1)
HCV AB SER QL: NORMAL
HGB BLD-MCNC: 13.6 G/DL (ref 11.5–15.4)
IRON SATN MFR SERPL: 19 %
IRON SERPL-MCNC: 75 UG/DL (ref 50–170)
MCH RBC QN AUTO: 30.8 PG (ref 26.8–34.3)
MCHC RBC AUTO-ENTMCNC: 33.2 G/DL (ref 31.4–37.4)
MCV RBC AUTO: 93 FL (ref 82–98)
PLATELET # BLD AUTO: 226 THOUSANDS/UL (ref 149–390)
PMV BLD AUTO: 10.2 FL (ref 8.9–12.7)
POTASSIUM SERPL-SCNC: 4.1 MMOL/L (ref 3.5–5.3)
PROT SERPL-MCNC: 7 G/DL (ref 6.4–8.2)
PTH-INTACT SERPL-MCNC: 42 PG/ML (ref 18.4–80.1)
RBC # BLD AUTO: 4.42 MILLION/UL (ref 3.81–5.12)
RPR SER QL: NORMAL
SODIUM SERPL-SCNC: 143 MMOL/L (ref 136–145)
TIBC SERPL-MCNC: 402 UG/DL (ref 250–450)
VIT B12 SERPL-MCNC: 391 PG/ML (ref 100–900)
WBC # BLD AUTO: 6.91 THOUSAND/UL (ref 4.31–10.16)

## 2021-05-06 PROCEDURE — 84630 ASSAY OF ZINC: CPT

## 2021-05-06 PROCEDURE — 36415 COLL VENOUS BLD VENIPUNCTURE: CPT

## 2021-05-06 PROCEDURE — 82728 ASSAY OF FERRITIN: CPT

## 2021-05-06 PROCEDURE — 84590 ASSAY OF VITAMIN A: CPT

## 2021-05-06 PROCEDURE — 83550 IRON BINDING TEST: CPT

## 2021-05-06 PROCEDURE — 82746 ASSAY OF FOLIC ACID SERUM: CPT

## 2021-05-06 PROCEDURE — 84425 ASSAY OF VITAMIN B-1: CPT

## 2021-05-06 PROCEDURE — 87340 HEPATITIS B SURFACE AG IA: CPT

## 2021-05-06 PROCEDURE — 80053 COMPREHEN METABOLIC PANEL: CPT

## 2021-05-06 PROCEDURE — 83970 ASSAY OF PARATHORMONE: CPT

## 2021-05-06 PROCEDURE — 82607 VITAMIN B-12: CPT

## 2021-05-06 PROCEDURE — 82306 VITAMIN D 25 HYDROXY: CPT

## 2021-05-06 PROCEDURE — 83540 ASSAY OF IRON: CPT

## 2021-05-06 PROCEDURE — 86592 SYPHILIS TEST NON-TREP QUAL: CPT

## 2021-05-06 PROCEDURE — 86803 HEPATITIS C AB TEST: CPT

## 2021-05-06 PROCEDURE — 85027 COMPLETE CBC AUTOMATED: CPT

## 2021-05-06 PROCEDURE — 87389 HIV-1 AG W/HIV-1&-2 AB AG IA: CPT

## 2021-05-07 LAB — HIV 1+2 AB+HIV1 P24 AG SERPL QL IA: NORMAL

## 2021-05-11 LAB — ZINC SERPL-MCNC: 107 UG/DL (ref 44–115)

## 2021-05-12 LAB — VIT A SERPL-MCNC: 55.9 UG/DL (ref 18.9–57.3)

## 2021-05-14 LAB — VIT B1 BLD-SCNC: 148.8 NMOL/L (ref 66.5–200)

## 2021-05-21 ENCOUNTER — TELEPHONE (OUTPATIENT)
Dept: FAMILY MEDICINE CLINIC | Facility: CLINIC | Age: 26
End: 2021-05-21

## 2021-05-21 ENCOUNTER — OFFICE VISIT (OUTPATIENT)
Dept: FAMILY MEDICINE CLINIC | Facility: CLINIC | Age: 26
End: 2021-05-21
Payer: COMMERCIAL

## 2021-05-21 VITALS
BODY MASS INDEX: 30.9 KG/M2 | OXYGEN SATURATION: 98 % | SYSTOLIC BLOOD PRESSURE: 102 MMHG | HEIGHT: 63 IN | HEART RATE: 72 BPM | RESPIRATION RATE: 16 BRPM | WEIGHT: 174.4 LBS | TEMPERATURE: 99.1 F | DIASTOLIC BLOOD PRESSURE: 60 MMHG

## 2021-05-21 DIAGNOSIS — J32.1 CHRONIC FRONTAL SINUSITIS: Primary | ICD-10-CM

## 2021-05-21 DIAGNOSIS — J30.1 SEASONAL ALLERGIC RHINITIS DUE TO POLLEN: Primary | ICD-10-CM

## 2021-05-21 PROCEDURE — 3725F SCREEN DEPRESSION PERFORMED: CPT | Performed by: NURSE PRACTITIONER

## 2021-05-21 PROCEDURE — 99213 OFFICE O/P EST LOW 20 MIN: CPT | Performed by: NURSE PRACTITIONER

## 2021-05-21 RX ORDER — LORATADINE 10 MG/1
10 TABLET ORAL DAILY
Qty: 30 TABLET | Refills: 5 | Status: SHIPPED | OUTPATIENT
Start: 2021-05-21 | End: 2022-05-24 | Stop reason: SDUPTHER

## 2021-05-21 RX ORDER — AMOXICILLIN 875 MG/1
875 TABLET, COATED ORAL 2 TIMES DAILY
Qty: 20 TABLET | Refills: 0 | Status: SHIPPED | OUTPATIENT
Start: 2021-05-21 | End: 2021-05-21 | Stop reason: SDUPTHER

## 2021-05-21 RX ORDER — AMOXICILLIN 875 MG/1
875 TABLET, COATED ORAL 2 TIMES DAILY
Qty: 20 TABLET | Refills: 0 | Status: SHIPPED | OUTPATIENT
Start: 2021-05-21 | End: 2021-05-31

## 2021-05-21 NOTE — TELEPHONE ENCOUNTER
Pt called and said her insurance will not cover the claritan D and you told her to call back and you will send for the regular claritan and send to [facility] on [address]

## 2021-05-21 NOTE — PROGRESS NOTES
FAMILY PRACTICE OFFICE VISIT       NAME: César Hicks  AGE: 30 y o  SEX: female       : 1995        MRN: [id number]    DATE: 2021  TIME: 12:14 PM    Assessment and Plan   1  Chronic frontal sinusitis  -     loratadine-pseudoephedrine (CLARITIN-D 12-HOUR) 5-120 mg per tablet; Take 1 tablet by mouth 2 (two) times a day  -     amoxicillin (AMOXIL) 875 mg tablet; Take 1 tablet (875 mg total) by mouth 2 (two) times a day for 10 days                 Chief Complaint     Chief Complaint   Patient presents with    Facial Pain       History of Present Illness   Stephie Scott is a 30y o -year-old female who is here for allergy and sinus symptoms  Sinus congestion  pnd  Took allegra with no relief  Lmp:  21      Review of Systems   Review of Systems   Constitutional: Positive for fatigue  Negative for chills and fever  HENT: Positive for congestion, postnasal drip, rhinorrhea, sinus pressure and sinus pain  Negative for sore throat  Eyes: Negative for photophobia and visual disturbance  Respiratory: Negative for cough, shortness of breath and wheezing  Cardiovascular: Negative for chest pain and palpitations  Gastrointestinal: Negative for constipation and diarrhea  Musculoskeletal: Negative for arthralgias and myalgias  Skin: Negative for rash  Neurological: Positive for headaches  Negative for dizziness and light-headedness  Hematological: Negative for adenopathy  Psychiatric/Behavioral: Negative for dysphoric mood  The patient is not nervous/anxious          Active Problem List     Patient Active Problem List   Diagnosis    Hemorrhoids, external    Acne vulgaris    Hyperinsulinemia    Moderate dysplasia of cervix (SALTY II)    Annual physical exam    PCOS (polycystic ovarian syndrome)    Bariatric surgery status    Encounter for gynecological examination (general) (routine) without abnormal findings    Need for vaccination    Postsurgical malabsorption    Encounter for surgical aftercare following surgery of digestive system    Low vitamin B12 level    High blood copper level    BMI 27 0-27 9,adult    Extrinsic asthma without complication    Overweight    Menorrhagia with regular cycle    Dysuria    History of COVID-19         Past Medical History:  Past Medical History:   Diagnosis Date    Abnormal Pap smear of cervix     2018/2019 - HSIL/CIN2-3    Acne     Asthma     exercise induced asthma/no recent problems    Bariatric surgery status     Chronic thoracic back pain 11/9/2017    Eczema     Exercises 1 to 2 times per week     occas 3x/week    External hemorrhoids     last assessed 8/15/12; resolved 6/13/14    Fatigue     last assessed 4/8/13; resolved 6/13/14   [facility][1] discharge follow-up 8/19/2019    HPV (human papilloma virus) infection     2019 (+) HRHPV type 16/18 negative    Obesity     PCOS (polycystic ovarian syndrome)     Postsurgical malabsorption        Past Surgical History:  Past Surgical History:   Procedure Laterality Date    EGD      CO BREAST REDUCTION Bilateral 2/22/2018    Procedure: BREAST REDUCTION;  Surgeon: Lee Roberson MD;  Location: [facility];  Service: Plastics    CO LAP, DEANDRE RESTRICT PROC, LONGITUDINAL GASTRECTOMY N/A 8/13/2019    Procedure: GASTRECTOMY LAPAROSCOPIC SLEEVE WITH ROBOTICS;  Surgeon: Nicole Zavala MD;  Location: [facility];  Service: Bariatrics    REDUCTION MAMMAPLASTY      WISDOM TOOTH EXTRACTION         Family History:  Family History   Problem Relation Age of Onset    Clotting disorder Mother         with menstration    Diabetes Father     Breast cancer Maternal Aunt     Cancer Maternal Aunt         breast cancer    Hypertension Neg Hx     Heart disease Neg Hx     Thyroid disease Neg Hx     Stroke Neg Hx        Social History:  Social History     Socioeconomic History    Marital status: Single     Spouse name: Not on file    Number of children: Not on file    Years of education: Not on file  Highest education level: Not on file   Occupational History    Not on file   Social Needs    Financial resource strain: Not on file    Food insecurity     Worry: Not on file     Inability: Not on file    Transportation needs     Medical: Not on file     Non-medical: Not on file   Tobacco Use    Smoking status: Never Smoker    Smokeless tobacco: Never Used   Substance and Sexual Activity    Alcohol use: Yes     Comment: social    Drug use: No    Sexual activity: Yes     Partners: Male     Birth control/protection: Ring   Lifestyle    Physical activity     Days per week: Not on file     Minutes per session: Not on file    Stress: Not on file   Relationships    Social connections     Talks on phone: Not on file     Gets together: Not on file     Attends Gnosticist service: Not on file     Active member of club or organization: Not on file     Attends meetings of clubs or organizations: Not on file     Relationship status: Not on file    Intimate partner violence     Fear of current or ex partner: Not on file     Emotionally abused: Not on file     Physically abused: Not on file     Forced sexual activity: Not on file   Other Topics Concern    Not on file   Social History Narrative    Not on file       Objective     Vitals:    05/21/21 1114   BP: 102/60   Pulse: 72   Resp: 16   Temp: 99 1 °F (37 3 °C)   SpO2: 98%     Wt Readings from Last 3 Encounters:   05/21/21 79 1 kg (174 lb 6 4 oz)   04/30/21 77 2 kg (170 lb 3 2 oz)   04/21/21 78 kg (172 lb)       Physical Exam  Vitals signs and nursing note reviewed  Constitutional:       Appearance: Normal appearance  HENT:      Head: Normocephalic and atraumatic  Right Ear: Tympanic membrane, ear canal and external ear normal       Left Ear: Tympanic membrane, ear canal and external ear normal       Nose: Mucosal edema and rhinorrhea present        Mouth/Throat:      Mouth: Mucous membranes are moist    Eyes:      Conjunctiva/sclera: Conjunctivae normal    Neck:      Musculoskeletal: Normal range of motion and neck supple  Cardiovascular:      Rate and Rhythm: Normal rate  Heart sounds: Normal heart sounds  Pulmonary:      Effort: Pulmonary effort is normal       Breath sounds: Normal breath sounds  Abdominal:      General: Bowel sounds are normal    Musculoskeletal: Normal range of motion  Skin:     General: Skin is warm and dry  Capillary Refill: Capillary refill takes less than 2 seconds  Neurological:      General: No focal deficit present  Mental Status: She is alert and oriented to person, place, and time     Psychiatric:         Mood and Affect: Mood normal          Behavior: Behavior normal          Pertinent Laboratory/Diagnostic Studies:  Lab Results   Component Value Date    BUN 17 05/06/2021    CREATININE 0 84 05/06/2021    CALCIUM 8 5 05/06/2021    K 4 1 05/06/2021    CO2 28 05/06/2021     05/06/2021     Lab Results   Component Value Date    ALT 16 05/06/2021    AST 11 05/06/2021    ALKPHOS 67 05/06/2021       Lab Results   Component Value Date    WBC 6 91 05/06/2021    HGB 13 6 05/06/2021    HCT 41 0 05/06/2021    MCV 93 05/06/2021     05/06/2021       No results found for: TSH    No results found for: CHOL  No results found for: TRIG  No results found for: HDL  No results found for: [facility]  Lab Results   Component Value Date    HGBA1C 5 4 07/17/2019       Results for orders placed or performed in visit on 05/06/21   Zinc   Result Value Ref Range    Zinc 107 44 - 115 ug/dL   Vitamin D 25 hydroxy   Result Value Ref Range    Vit D, 25-Hydroxy 31 5 30 0 - 100 0 ng/mL   Vitamin B12   Result Value Ref Range    Vitamin B-12 391 100 - 900 pg/mL   Vitamin B1, whole blood   Result Value Ref Range    Vitamin B1, Whole Blood 148 8 66 5 - 200 0 nmol/L   Vitamin A   Result Value Ref Range    Vitamin A 55 9 18 9 - 57 3 ug/dL   PTH, intact   Result Value Ref Range    PTH 42 0 18 4 - 80 1 pg/mL   CBC and Platelet   Result Value Ref Range    WBC 6 91 4 31 - 10 16 Thousand/uL    RBC 4 42 3 81 - 5 12 Million/uL    Hemoglobin 13 6 11 5 - 15 4 g/dL    Hematocrit 41 0 34 8 - 46 1 %    MCV 93 82 - 98 fL    MCH 30 8 26 8 - 34 3 pg    MCHC 33 2 31 4 - 37 4 g/dL    RDW 12 7 11 6 - 15 1 %    Platelets 910 602 - 890 Thousands/uL    MPV 10 2 8 9 - 12 7 fL   Comprehensive metabolic panel   Result Value Ref Range    Sodium 143 136 - 145 mmol/L    Potassium 4 1 3 5 - 5 3 mmol/L    Chloride 106 100 - 108 mmol/L    CO2 28 21 - 32 mmol/L    ANION GAP 9 4 - 13 mmol/L    BUN 17 5 - 25 mg/dL    Creatinine 0 84 0 60 - 1 30 mg/dL    Glucose, Fasting 76 65 - 99 mg/dL    Calcium 8 5 8 3 - 10 1 mg/dL    AST 11 5 - 45 U/L    ALT 16 12 - 78 U/L    Alkaline Phosphatase 67 46 - 116 U/L    Total Protein 7 0 6 4 - 8 2 g/dL    Albumin 3 6 3 5 - 5 0 g/dL    Total Bilirubin 0 32 0 20 - 1 00 mg/dL    eGFR 96 ml/min/1 73sq m   Ferritin   Result Value Ref Range    Ferritin 51 8 - 388 ng/mL   Folate   Result Value Ref Range    Folate 17 1 3 1 - 17 5 ng/mL   Iron Saturation %   Result Value Ref Range    Iron Saturation 19 %    TIBC 402 250 - 450 ug/dL    Iron 75 50 - 170 ug/dL   RPR   Result Value Ref Range    RPR Non-Reactive Non-Reactive   HIV 1/2 Antigen/Antibody (4th Generation) w Reflex [facility]   Result Value Ref Range    HIV-1/HIV-2 Ab Non-Reactive Non-Reactive   Hepatitis C antibody   Result Value Ref Range    Hepatitis C Ab Non-reactive Non-reactive   Hepatitis B surface antigen   Result Value Ref Range    Hepatitis B Surface Ag Non-reactive Non-reactive, NonReactive - Confirmed       No orders of the defined types were placed in this encounter        ALLERGIES:  No Known Allergies    Current Medications     Current Outpatient Medications   Medication Sig Dispense Refill    albuterol (Ventolin HFA) 90 mcg/act inhaler Inhale 2 puffs every 6 (six) hours as needed for wheezing 1 Inhaler 1    clindamycin-benzoyl peroxide (BENZACLIN) gel Apply to face twice daily for acne 25 g 1    etonogestrel-ethinyl estradiol (NUVARING) 0 12-0 015 MG/24HR vaginal ring INSERT 1 RING VAGINALLY AS DIRECTED  REMOVE AFTER 3 WEEKS & WAIT 7 DAYS BEFORE INSERTING A NEW RING 3 each 2    hydrocortisone 2 5 % ointment Apply topically 2 (two) times a day 30 g 2    spironolactone (ALDACTONE) 50 mg tablet TAKE 3 TABLETS BY MOUTH EVERY DAY 90 tablet 3    amoxicillin (AMOXIL) 875 mg tablet Take 1 tablet (875 mg total) by mouth 2 (two) times a day for 10 days 20 tablet 0    loratadine-pseudoephedrine (CLARITIN-D 12-HOUR) 5-120 mg per tablet Take 1 tablet by mouth 2 (two) times a day 60 tablet 0     No current facility-administered medications for this visit            Health Maintenance     Health Maintenance   Topic Date Due    Pneumococcal Vaccine: Pediatrics (0 to 5 Years) and At-Risk Patients (6 to 59 Years) (1 of 1 - PPSV23) 10/13/2021 (Originally 11/29/1999)    Annual Physical  10/13/2021    BMI: Followup Plan  01/06/2022    Depression Screening PHQ  05/21/2022    BMI: Adult  05/21/2022    Cervical Cancer Screening  12/10/2023    DTaP,Tdap,and Td Vaccines (8 - Td) 10/12/2026    HIV Screening  Completed    Hepatitis C Screening  Completed    HIB Vaccine  Completed    Hepatitis B Vaccine  Completed    IPV Vaccine  Completed    Hepatitis A Vaccine  Completed    Influenza Vaccine  Completed    HPV Vaccine  Completed    COVID-19 Vaccine  Completed    Meningococcal ACWY Vaccine  Aged Out     Immunization History   Administered Date(s) Administered    DTP 01/14/1994, 03/15/1994, 05/11/1994, 03/15/1995    DTaP 5 12/14/1998    HPV Quadrivalent 03/13/2007, 05/17/2007, 12/19/2007    Hep A, adult 03/13/2007, 12/19/2007    Hep B, adult 1993, 01/04/1994, 08/11/1994    Hib (PRP-OMP) 01/14/1994, 03/15/1994, 05/11/1994, 03/15/1995    INFLUENZA 01/16/2015, 10/12/2016, 11/09/2017, 10/10/2018    Influenza Quadrivalent Preservative Free 3 years and older IM 01/16/2015, 10/12/2016, 11/09/2017  Influenza, injectable, quadrivalent, preservative free 0 5 mL 10/22/2019, 10/13/2020    Influenza, seasonal, injectable 12/19/2007, 11/19/2008, 10/06/2009, 01/11/2013, 11/13/2013    MMR 11/30/1994, 12/01/1997    Meningococcal Polysaccharide (MPSV4) 03/13/2007    OPV 01/14/1994, 03/15/1994, 05/11/1994, 12/14/1998    SARS-CoV-2 / COVID-19 mRNA IM (Michelle Pillar) 01/21/2021, 02/19/2021    Td (adult), adsorbed 08/17/2004    Tdap 04/08/2009, 10/12/2016    Tuberculin Skin Test-PPD Intradermal 10/17/2016, 05/15/2019, 05/22/2019, 10/22/2019, 08/03/2020    Varicella 03/20/1997, 12/19/2007          MARIZA Carballo

## 2021-06-02 ENCOUNTER — OFFICE VISIT (OUTPATIENT)
Dept: DERMATOLOGY | Facility: CLINIC | Age: 26
End: 2021-06-02
Payer: COMMERCIAL

## 2021-06-02 VITALS — HEIGHT: 63 IN | TEMPERATURE: 98.2 F | WEIGHT: 172 LBS | BODY MASS INDEX: 30.48 KG/M2

## 2021-06-02 DIAGNOSIS — H01.139 ATOPIC DERMATITIS OF EYELID, UNSPECIFIED LATERALITY: ICD-10-CM

## 2021-06-02 DIAGNOSIS — D22.9 MULTIPLE BENIGN MELANOCYTIC NEVI: ICD-10-CM

## 2021-06-02 DIAGNOSIS — L70.0 ACNE VULGARIS: ICD-10-CM

## 2021-06-02 DIAGNOSIS — L21.9 SEBORRHEA: Primary | ICD-10-CM

## 2021-06-02 PROCEDURE — 99214 OFFICE O/P EST MOD 30 MIN: CPT | Performed by: STUDENT IN AN ORGANIZED HEALTH CARE EDUCATION/TRAINING PROGRAM

## 2021-06-02 PROCEDURE — 1036F TOBACCO NON-USER: CPT | Performed by: STUDENT IN AN ORGANIZED HEALTH CARE EDUCATION/TRAINING PROGRAM

## 2021-06-02 RX ORDER — PIMECROLIMUS 10 MG/G
CREAM TOPICAL 2 TIMES DAILY
Qty: 30 G | Refills: 3 | Status: SHIPPED | OUTPATIENT
Start: 2021-06-02

## 2021-06-02 NOTE — PROGRESS NOTES
[facility] Follow Up Note    Patient Name: Erik Hodgkins  Encounter Date: 06/02/2021    Today's Chief Concerns:  [place] Concern #1: Acne   Concern #2:  Mole   Concern #3:  Eyelid Dermatitis     Current Medications:    Current Outpatient Medications:     albuterol (Ventolin HFA) 90 mcg/act inhaler, Inhale 2 puffs every 6 (six) hours as needed for wheezing, Disp: 1 Inhaler, Rfl: 1    clindamycin-benzoyl peroxide (BENZACLIN) gel, Apply to face twice daily for acne, Disp: 25 g, Rfl: 1    etonogestrel-ethinyl estradiol (NUVARING) 0 12-0 015 MG/24HR vaginal ring, INSERT 1 RING VAGINALLY AS DIRECTED  REMOVE AFTER 3 WEEKS & WAIT 7 DAYS BEFORE INSERTING A NEW RING, Disp: 3 each, Rfl: 2    hydrocortisone 2 5 % ointment, Apply topically 2 (two) times a day, Disp: 30 g, Rfl: 2    loratadine (CLARITIN) 10 mg tablet, Take 1 tablet (10 mg total) by mouth daily, Disp: 30 tablet, Rfl: 5    loratadine-pseudoephedrine (CLARITIN-D 12-HOUR) 5-120 mg per tablet, Take 1 tablet by mouth 2 (two) times a day, Disp: 60 tablet, Rfl: 0    spironolactone (ALDACTONE) 50 mg tablet, TAKE 3 TABLETS BY MOUTH EVERY DAY, Disp: 90 tablet, Rfl: 3    CONSTITUTIONAL:   Vitals:    06/02/21 0938   Temp: 98 2 °F (36 8 °C)   TempSrc: Tympanic   Weight: 78 kg (172 lb)   Height: 5' 3" (1 6 m)       Specific Alerts:    Have you been seen by a [facility]ologist in the last 3 years? YES    Are you pregnant or planning to become pregnant? No    Are you currently or planning to be nursing or breast feeding? No    No Known Allergies    May we call your Preferred Phone number to discuss your specific medical information? YES    May we leave a detailed message that includes your specific medical information? YES    Have you traveled outside of the [facility] in the past 3 months? No    Do you currently have a pacemaker or defibrillator?  No    Do you have any artificial heart valves, joints, plates, screws, rods, stents, pins, etc? No   - If Yes, were any placed within the last 2 years? Do you require any medications prior to a surgical procedure? No       Are you taking any medications that cause you to bleed more easily ("blood thinners") No    Have you ever experienced a rapid heartbeat with epinephrine? No    Have you ever been treated with "gold" (gold sodium thiomalate) therapy? No    [facility] can help with wrinkles, "laugh lines," facial volume loss, "double chin," "love handles," age spots, and more  Are you interested in learning today about some of the skin enhancement procedures that we offer? (If Yes, please provide more detail) No    Review of Systems:  Have you recently had or currently have any of the following? · Fever or chills: No  · Night Sweats: No  · Headaches: No  · Weight Gain: No  · Weight Loss: No  · Blurry Vision: No  · Nausea: No  · Vomiting: No  · Diarrhea: No  · Blood in Stool: No  · Abdominal Pain: No  · Itchy Skin: YES  · Painful Joints: No  · Swollen Joints: No  · Muscle Pain: No  · Irregular Mole: No  · Sun Burn: YES  · Dry Skin: YES  · Skin Color Changes: No  · Scar or Keloid: No  · Cold Sores/Fever Blisters: No  · Bacterial Infections/MRSA: No  · Anxiety: No  · Depression: No  · Suicidal or Homicidal Thoughts: No      PSYCH: Normal mood and affect  EYES: Normal conjunctiva  ENT: Normal lips and oral mucosa  CARDIOVASCULAR: No edema  RESPIRATORY: Normal respirations  HEME/LYMPH/IMMUNO:  No otensible subQ swelling except as noted below in ASSESSMENT AND PLAN BY DIAGNOSIS    FULL ORGAN SYSTEM SKIN EXAM (SKIN)   Hair,  Face Normal except as noted below in Assessment       Right Arm/Hand/Fingers Normal except as noted below in Assessment   Left Arm/Hand/Fingers Normal except as noted below in Assessment           Back/Spine Normal except as noted below in Assessment                   1  EYELID DERMATITIS, ACD?     Physical Exam:  · Anatomic Location Affected:  Upper eyelids  · Morphological Description:  Erythema with fine scale  · Pertinent Positives: N/A  · Pertinent Negatives: N/A     Additional History of Present Condition:  Patient reports that the hydrocortisone is helping when she is using it but when she stops it comes right back  Assessment and Plan:  Based on a thorough discussion of this condition and the management approach to it (including a comprehensive discussion of the known risks, side effects and potential benefits of treatment), the patient (family) agrees to implement the following specific plan:  · Could be a form of allergy (pt uses eyeshadow and acrylic nails  Pt will consider relationship of starting these products and onset of rash)  · Apply Elidel 1% cream: apply topically twice daily     2  ACNE VULGARIS ("COMMON ACNE")  DOING well, happy with current tx    Physical Exam:   Psychiatric/Mood: Normal    Anatomic Location Affected: Face    Morphological Description:  o Open/Closed Comedones:  - Few ("Mild")  o Inflammatory Papules/Pustules:  - Few ("Mild")  o Nodules:  - No evidence ("Clear")  o Scarring:  - No evidence ("Clear")  o Excoriations:  - No evidence ("Clear")  o Local Skin Redness/Erythema:  - No evidence ("Clear")  o Local Skin Dryness/Scaling:  - No evidence ("Clear")  o Local Skin Dyspigmentation:  - No evidence ("Clear")   Pertinent Positives: N/A   Pertinent Negatives: N/A    Additional History of Present Condition:  Patient is currently using Benzaclin Gel and Spironolactone she has noticed an improvement on the current regimen  Assessment and Plan:   We reviewed the causes of acne, the kinds of acne, and the expected clinical course   We discussed treatment options ranging from over-the-counter products, topical retinoids, antibiotics, BP, hormonal therapies (OCPs/spironolactone), and isotretinoin (Accutane)   We reviewed specific over-the-counter interventions and medications   Recommended typical hygiene measures including water-based facial products, washing regularly with mild cleanser, and refraining from picking and popping any pimples   Recommended non-comedogenic sunscreen use daily   Expectations of therapy discussed  Side effects, risks and benefits of medications discussed   A comprehensive handout on Acne was provided   The phone number to call in case of questions or concerns (and instructions to stop medications in such a scenario) was provided   After lengthy discussion of etiology and treatment options, we decided to implement the following personalized treatment plan:    Based on a thorough discussion of this condition and the management approach to it (including a comprehensive discussion of the known risks, side effects and potential benefits of treatment), the patient (family) agrees to implement the following specific plan:    --------------------------------------------------------------------------------------  YOUR PERSONALIZED ACNE ACTION PLAN    [facility]    1) SKIN HYGIENE:  In the shower, wash your face, chest and back gently with Cetaphil moisturizing cleanser or Dove Fragrance-free bar  Do not use a luffa or washcloth as these tend to be too irritating to acne-prone skin  2) Spironolactone 150 mg daily  K 4 1 in 5/2021  Cannot get pregnant while on medication as can cause birth defects  3) Benzaclin Gel- as needed twice daily for spot treatment       EVENING ROUTINE    1) SKIN HYGIENE:  In the shower, wash your face, chest and back gently with Cetaphil moisturizing cleanser or Dove Fragrance-free bar  Do not use a lufa or washcloth as these tend to be too irritating to acne-prone skin  2) Benzaclin Gel- As needed twice daily for spot treatment   3) Continue Differin once daily       REMEMBER:  Always take your acne pills with lots of water! A pill stuck in your throat can cause significant burning and irritation     Drink a full glass of water to ensure the pill gets into your stomach  Avoid popping a pill right before bed, and stay upright for at least 1 hour after taking a pill  ACNE:  WHAT ZIT ALL ABOUT? WHY DO I HAVE ACNE/PIMPLES? Your skin is made of layers  To keep the skin from becoming dry and cracked, the skin needs oil  The oil is made in little wells in the deeper layers in the skin  People with acne have glands that make more oil and are more easily plugged, causing the glands to swell  Hormones, bacteria and your inherited tendency to have acne all play a role  The medical term for pimples is acne or acne vulgaris (vulgaris means common)  Most people get some acne  Acne does not come from being dirty  Instead, it is an expected consequence of changes that occur during normal growth and development  Hormones, bacteria, and your family's tendency to have acne may all play a role  Whiteheads or blackheads are openings of the glands (glands are the oil factories) onto the surface of the skin  Blackheads are not caused by dirt blocking the pores; instead, they result from the oxidation reaction of oil and skin in the pores with the air (like a rust reaction)  WHAT ABOUT STRESS? Stress does not cause acne but it can make it worse  Make sure you get enough sleep and daily exercise! WHAT ABOUT FOODS/DIET? Try to eat a balanced, healthy diet  Some people feel that certain foods worsen their acne  While there aren't many studies available on this question, severe dietary changes are unlikely to help your acne and may be harmful to the health of your skin  If you find that a certain food seems to aggravate your acne, you may consider avoiding that food  Discuss this with your physician! WHAT CAUSES MY ACNE?   There are four contributors to acne--the body's natural oil (sebum), clogged pores, bacteria (with the scientific name Propionibacterium acnes, or P  acnes, for short), and the body's reaction to the bacteria living in the clogged pores (which causes inflammation)  Here's what happens:     Sebum is produced in the normal oil-making glands in the deeper layers of the skin and reaches the surface through the skin's pores  An increase in certain hormones occurs around the time of puberty, and these hormones trigger the oil glands to produce increased amounts of sebum   Pores with excess oil tend to become clogged more easily   At the same time, P  acnes--one of the many types of bacteria that normally live on everyone's skin--thrives in the excess oil and causes a skin reaction (inflammation)   If a pore is clogged close to the surface, there is little inflammation  However, this results in the formation of whiteheads (closed comedones) or blackheads (open comedones) at the surface of the skin   A plug that extends to, or forms a little deeper in the pore, or one that enlarges or ruptures may cause more inflammation  The result is red bumps (papules) and pus-filled pimples (pustules)   If plugging happens in the deepest skin layer, the inflammation may be even more severe, resulting in the formation of nodules or cysts  When these types of acne heal, they may leave behind discolored areas or true scars  SKIN HYGIENE:  HOW SHOULD I [address] MY SKIN? Acne does not come from being dirty, however, washing your face is part of taking good care of your skin and will help keep your face clear  Good skin hygiene is, therefore, critical to support any acne treatment plan  Here are several specific suggestions for practicing good skin hygiene and keeping your skin looking its best:     You should wash acne-prone skin TWICE A DAY: Once in the morning and once in the evening  This does include any showers you take that day, so do not overdo it!  Do not scrub the skin with a washcloth or loofah as these can irritate and inflame your acne  Acne does not come from dirt, so it is not necessary to scrub the skin clean   In fact, scrubbing may lead to dryness and irritation that makes the acne even worse and harder for patients to tolerate acne medications   Use a gentle facial moisturizing cleanser (Cetaphil Moisturizing Cleanser or Dove Fragrance-Free bar)  Avoid using soaps like [address], [address], or soft/liquid soaps as these products will dry your skin   Do not use any over-the-counter acne washes without your doctor's specific instruction to do so  These products often contain salicylic acid or benzoyl peroxide  These ingredients can be helpful in clearing oil from the skin and reducing bacteria, but they may also be drying and can add to irritation   Do not use exfoliating products with microbeads or brushes as these can cause irritation to the skin   Facials and other treatments to remove, squeeze, or clean out pores are not recommended  Manipulating the skin in this way can make acne worse and can lead to severe infections and/or scarring  It also increases the likelihood that the skin will not be able to tolerate acne medications   Try not to pop pimples or pick at your acne as this can delay healing and may result in scarring or skin color changes (dark spots) that are often more noticeable than the acne itself  Picking/popping acne can also cause a serious skin infection   Wash or change your pillow case once to twice a week, especially if you use products in your hair   Wash the skin as soon as possible after playing sports or other activities that cause a lot of sweating  Also, pay attention to how your sports equipment (shoulder pads, helmet strap, etc ) might be making your acne worse   When you use makeup, moisturizer, or sunscreen make sure that these products are labeled non-comedogenic, or won't clog pores, or won't cause acne         SHOULD I TREAT MY ACNE? There are a number of other skin conditions that can look like acne   If there is any question about the diagnosis, then the person should be evaluated by a board certified pediatric and adolescent dermatologist   A physician should examine any child with acne who is between the ages of 3and 9years of age, as acne in this mid-childhood age group is not normal and may signal an underlying problem  If a preadolescent (9to 6years of age) or adolescent (15to 25years of age) has mild acne and the condition is not bothersome to the individual, proper and regular skin care (what your doctor may call skin hygiene) may be all that is needed at this point  Many people do, however, need specific acne medications to help their skin look and feel its best  Your doctor will tell you if you are one of these people  If so, you may be advised to use an over-the-counter or prescription medication that is applied to the skin (a topical medication) or if the addition of an oral medication (a medication taken by [employer]) is needed  The good news is that the medications work well when used properly! Some specific factors that may influence the choice of acne therapy include:     Severity  The number and type of skin lesions (papules or comedones) and the degree of inflammation (mild, moderate or severe)   Scarring  Scarring is most common when acne is severe, but it can happen even in children with mild acne   Impact  If a child is experiencing emotional complications because of the acne or is experiencing negative comments from other children   Cost of the acne medications  An acne expert can help to keep out of pocket costs to a minimum by utilizing the correct medications and the least expensive options   The patient's skin type (oily versus dry or combination skin, for example)   Potential side effects of the medication   The ease or overall complexity of the treatment plan or medication  WHAT ACNE TREATMENTS ARE AVAILABLE?     Medications for acne try to stop the formation of new pimples by reducing or removing the oil, bacteria, and other things (like dead skin cells) that clog the pores  They can also decrease the inflammation or irritation response of the skin to bacteria  It may take from 6 to 8 weeks (about 2 months!) before you see any improvement and know if the medication is effective  It takes the layers of skin this long to regenerate  Remember, these medications do not cure the condition--the acne improves because of the medication  Therefore, treatment must be continued in order to prevent the return of acne lesions  There are many types of acne treatments  Some are applied to the skin (topical medications) and some are taken by mouth (oral medications)  In most cases of mild acne, the doctor will start with a topical medication  There are many different topical medications that are helpful for acne  If acne is more severe and it does not respond adequately to a topical medication, or if it covers large body surface areas such as the back and/or chest, oral antibiotics such as Doxycycline or Minocycline and/or oral hormone therapy such as Oral Contraceptive Pills or Spironolactone may be prescribed  In the most severe cases, isotretinoin (Accutane) may be used  In general, it is usually best to start with acne medications that are least likely to cause side effects but are at the same time capable of addressing the specific causes for the acne  Some patients have a good result with just one medication, but many will need to use a combination of treatments: two or more different topical agents or an oral medication plus a topical medication  Another treatment used for acne may include corticosteroid injections, which are used to help relieve pain, decrease the size, and encourage the healing of large, inflamed acne nodules   Also, dermatologists sometimes perform acne surgery, using a fine needle, a pointed blade, or an instrument known as a comedone extractor to mechanically clean out clogged pores  One must always weigh the risk for inducing a scar with the potential benefits of any procedure  Prior treatment with topical retinoids can loosen whiteheads and blackheads and make it easier to physically remove such lesions  Heat-based devices, and light and laser therapy are being studied to see whether there is any role for such treatments in mild to moderate acne  At this time, there is not enough evidence to make general recommendations about their use  TOPICAL ACNE MEDICATIONS    WHAT KIND OF TOPICALS ARE THERE?  Benzoyl peroxide (BP) helps to fight inflammation and is anti-microbial (kills bacteria, viruses, and other microorganisms) and is believed to help prevent resistance of bacteria to topical antibiotics  A benzoyl peroxide wash may be recommended for use on large areas such as the chest and/or back  Mild irritation and dryness are common when first using benzoyl peroxide-containing products  Be careful because benzoyl peroxide can bleach towels and clothing!  Retinoids (such as adapalene, tretinoin, or tazarotene) unplug the oil glands by helping peel away the layers of skin and other things plugging the opening of the glands  Mild irritation and dryness are common when first using these products  Facial waxing and other skin procedures can lead to excessive irritation and should be avoided during retinoid therapy   Antibiotics fight bacteria and help decrease inflammation  Topical antibiotics commonly used in acne include clindamycin, erythromycin, and combination agents (such as clindamycin/benzoyl peroxide or erythromycin/benzoyl peroxide)  Mild irritation and dryness are common when first using these products  Typically, topical antibiotics should not be used alone as treatment for acne   Other topical agents include salicylic acid, azelaic acid, dapsone, and sulfacetamide   Mild irritation and dryness can also occur when first using these products  USING YOUR TOPICAL TREATMENTS LIKE A PRO   Apply topical medications only to clean, dry skin  Topical medications may lead to significant dryness of the affected areas  To minimize this, wait 15-20 minutes after washing before applying your topical medication   These medications work deep in the skin to prevent new breakouts  Spot treatment of individual pimples does not do much  When applying topical medications to the face, use the 5-dot method  Start by placing a small pea-sized amount of the medication on your finger  Then, place dots in each of five locations of your face: Mid-forehead, each cheek, nose, and chin  Next, rub the medication into the entire area of skin - not just on individual pimples! Try to avoid the delicate skin around your eyes and corners of your mouth   The medications are not magic! They take weeks if not months to work  Be patient and use your medicine on a daily basis or as directed for six weeks before asking if your skin looks better  Try not to miss more than one or two days each week when using your medications   If you are starting a new medication, then try using it every other night or even every third night   Gradually work up to [employer] a day    This will give your skin time to adjust    The same medications often come in various forms or formulations: Creams, ointments, lotions, gels, microspheres, or foams  Use the formulation that has been recommended and don't switch to other forms unless instructed  Some forms (such as alcohol based gels) may be more drying and less tolerable for certain skin types   Sometimes individual medications are not as effective as a combination of two or more agents  The doctor may need to try several medications or combinations before finding the one that is best for that patient   Moisturizer, sunscreen, and make-up may be used in conjunction with topical acne medications   In general, acne medications are applied first so they may directly contact the skin  Ask your physician to review specific application instructions!  It is especially important to always use sunscreen when using a topical retinoid or oral antibiotic  These drugs can make your skin more sensitive to the sun  In general, sunscreen gets applied AFTER any acne medications   Don't stop using your acne medications just because your acne got better  Remember, the acne is better because of the medication, and prevention is the key to treatment  HORMONAL THERAPY  Hormonal treatment is used only in females and usually consists of oral contraceptives (birth control pills)  Spironolactone is also sometimes used  HAVING PROBLEMS WITH ANY OF YOUR TREATMENTS? You should not be able to see any of the medicines on your face  If you can see a white film on your skin after you apply the medication, there is too much medicine in that area and you need to apply a thinner coat and make sure it is spread evenly on your face  If your skin gets too dry, you can apply a light (non-comedogenic) moisturizer on top of your medicine or you may switch to using the medicine every other day instead of every day  If your skin is still too irritated, you may need to switch to a milder medication  If your skin is red and very itchy, you may be allergic to the medication and you should stop using it  WHEN AND WHERE TO CALL WITH CONCERNS  We are here to help! If you experience any unusual symptoms, then stop taking or using the medication and call our office at [phone] (SKIN)  It is better to be safe than to be sorry! 3  MULTIPLE MELANOCYTIC NEVI ("Moles")     Physical Exam:  · Anatomic Location Affected: BACK  · Morphological Description:  Scattered, round to ovoid, symmetrical-appearing, even bordered, skin colored to dark brown macules/papules  · Denies pain, itch, bleeding  No treatments tried  Present for years   Present constantly; no modifying factors which make it worse or better  Denies actively changing or growing moles  Assessment and Plan:  Based on a thorough discussion of this condition and the management approach to it (including a comprehensive discussion of the known risks, side effects and potential benefits of treatment), the patient (family) agrees to implement the following specific plan:    · Monitor for changes  · Use sun protection  Apply SPF 30 or higher at least three times a day  Wear sun protecting clothing and hats  Worrisome signs of skin malignancy discussed, questions answered  Regular self-skin check discussed  Advised to call or return to office if patient notices any spots of concern, rapidly growing/changing lesions, bleeding lesions, non-healing lesions  Advised regular SPF use         Scribe Attestation    I,:  Jeanne Senior am acting as a scribe while in the presence of the attending physician :       I,:  Jones Henderson MD personally performed the services described in this documentation    as scribed in my presence :

## 2021-06-02 NOTE — PATIENT INSTRUCTIONS
1  EYELID DERMATITIS     Assessment and Plan:  Based on a thorough discussion of this condition and the management approach to it (including a comprehensive discussion of the known risks, side effects and potential benefits of treatment), the patient (family) agrees to implement the following specific plan:  · Could be a form of allergin   · Apply Elidel 1% cream: apply topically twice daily     2  ACNE VULGARIS ("COMMON ACNE")    --------------------------------------------------------------------------------------  YOUR PERSONALIZED ACNE ACTION PLAN    MORNING ROUTINE    1) SKIN HYGIENE:  In the shower, wash your face, chest and back gently with Cetaphil moisturizing cleanser or Dove Fragrance-free bar  Do not use a luffa or washcloth as these tend to be too irritating to acne-prone skin  2) Spironolactone 50 mg three times daily   3) Benzaclin Gel- as needed twice daily for spot treatment       EVENING ROUTINE    1) SKIN HYGIENE:  In the shower, wash your face, chest and back gently with Cetaphil moisturizing cleanser or Dove Fragrance-free bar  Do not use a lufa or washcloth as these tend to be too irritating to acne-prone skin  2) Benzaclin Gel- As needed twice daily for spot treatment   3) Continue Differin once daily       REMEMBER:  Always take your acne pills with lots of water! A pill stuck in your throat can cause significant burning and irritation  Drink a full glass of water to ensure the pill gets into your stomach  Avoid popping a pill right before bed, and stay upright for at least 1 hour after taking a pill  ACNE:  WHAT ZIT ALL ABOUT? WHY DO I HAVE ACNE/PIMPLES? Your skin is made of layers  To keep the skin from becoming dry and cracked, the skin needs oil  The oil is made in little wells in the deeper layers in the skin  People with acne have glands that make more oil and are more easily plugged, causing the glands to swell   Hormones, bacteria and your inherited tendency to have acne all play a role  The medical term for pimples is acne or acne vulgaris (vulgaris means common)  Most people get some acne  Acne does not come from being dirty  Instead, it is an expected consequence of changes that occur during normal growth and development  Hormones, bacteria, and your family's tendency to have acne may all play a role  Whiteheads or blackheads are openings of the glands (glands are the oil factories) onto the surface of the skin  Blackheads are not caused by dirt blocking the pores; instead, they result from the oxidation reaction of oil and skin in the pores with the air (like a rust reaction)  WHAT ABOUT STRESS? Stress does not cause acne but it can make it worse  Make sure you get enough sleep and daily exercise! WHAT ABOUT FOODS/DIET? Try to eat a balanced, healthy diet  Some people feel that certain foods worsen their acne  While there aren't many studies available on this question, severe dietary changes are unlikely to help your acne and may be harmful to the health of your skin  If you find that a certain food seems to aggravate your acne, you may consider avoiding that food  Discuss this with your physician! WHAT CAUSES MY ACNE? There are four contributors to acne--the body's natural oil (sebum), clogged pores, bacteria (with the scientific name Propionibacterium acnes, or P  acnes, for short), and the body's reaction to the bacteria living in the clogged pores (which causes inflammation)  Here's what happens:     Sebum is produced in the normal oil-making glands in the deeper layers of the skin and reaches the surface through the skin's pores  An increase in certain hormones occurs around the time of puberty, and these hormones trigger the oil glands to produce increased amounts of sebum   Pores with excess oil tend to become clogged more easily     At the same time, P  acnes--one of the many types of bacteria that normally live on everyone's skin--thrives in the excess oil and causes a skin reaction (inflammation)   If a pore is clogged close to the surface, there is little inflammation  However, this results in the formation of whiteheads (closed comedones) or blackheads (open comedones) at the surface of the skin   A plug that extends to, or forms a little deeper in the pore, or one that enlarges or ruptures may cause more inflammation  The result is red bumps (papules) and pus-filled pimples (pustules)   If plugging happens in the deepest skin layer, the inflammation may be even more severe, resulting in the formation of nodules or cysts  When these types of acne heal, they may leave behind discolored areas or true scars  SKIN HYGIENE:  HOW SHOULD I [address] MY SKIN? Acne does not come from being dirty, however, washing your face is part of taking good care of your skin and will help keep your face clear  Good skin hygiene is, therefore, critical to support any acne treatment plan  Here are several specific suggestions for practicing good skin hygiene and keeping your skin looking its best:     You should wash acne-prone skin TWICE A DAY: Once in the morning and once in the evening  This does include any showers you take that day, so do not overdo it!  Do not scrub the skin with a washcloth or loofah as these can irritate and inflame your acne  Acne does not come from dirt, so it is not necessary to scrub the skin clean  In fact, scrubbing may lead to dryness and irritation that makes the acne even worse and harder for patients to tolerate acne medications   Use a gentle facial moisturizing cleanser (Cetaphil Moisturizing Cleanser or Dove Fragrance-Free bar)  Avoid using soaps like [address], [address] [facility], or soft/liquid soaps as these products will dry your skin   Do not use any over-the-counter acne washes without your doctor's specific instruction to do so    These products often contain salicylic acid or benzoyl peroxide  These ingredients can be helpful in clearing oil from the skin and reducing bacteria, but they may also be drying and can add to irritation   Do not use exfoliating products with microbeads or brushes as these can cause irritation to the skin   Facials and other treatments to remove, squeeze, or clean out pores are not recommended  Manipulating the skin in this way can make acne worse and can lead to severe infections and/or scarring  It also increases the likelihood that the skin will not be able to tolerate acne medications   Try not to pop pimples or pick at your acne as this can delay healing and may result in scarring or skin color changes (dark spots) that are often more noticeable than the acne itself  Picking/popping acne can also cause a serious skin infection   Wash or change your pillow case once to twice a week, especially if you use products in your hair   Wash the skin as soon as possible after playing sports or other activities that cause a lot of sweating  Also, pay attention to how your sports equipment (shoulder pads, helmet strap, etc ) might be making your acne worse   When you use makeup, moisturizer, or sunscreen make sure that these products are labeled non-comedogenic, or won't clog pores, or won't cause acne         SHOULD I TREAT MY ACNE? There are a number of other skin conditions that can look like acne  If there is any question about the diagnosis, then the person should be evaluated by a board certified pediatric and adolescent dermatologist   A physician should examine any child with acne who is between the ages of 3and 9years of age, as acne in this mid-childhood age group is not normal and may signal an underlying problem     If a preadolescent (9to 6years of age) or adolescent (15to 25years of age) has mild acne and the condition is not bothersome to the individual, proper and regular skin care (what your doctor may call skin hygiene) may be all that is needed at this point  Many people do, however, need specific acne medications to help their skin look and feel its best  Your doctor will tell you if you are one of these people  If so, you may be advised to use an over-the-counter or prescription medication that is applied to the skin (a topical medication) or if the addition of an oral medication (a medication taken by [employer]) is needed  The good news is that the medications work well when used properly! Some specific factors that may influence the choice of acne therapy include:     Severity  The number and type of skin lesions (papules or comedones) and the degree of inflammation (mild, moderate or severe)   Scarring  Scarring is most common when acne is severe, but it can happen even in children with mild acne   Impact  If a child is experiencing emotional complications because of the acne or is experiencing negative comments from other children   Cost of the acne medications  An acne expert can help to keep out of pocket costs to a minimum by utilizing the correct medications and the least expensive options   The patient's skin type (oily versus dry or combination skin, for example)   Potential side effects of the medication   The ease or overall complexity of the treatment plan or medication  WHAT ACNE TREATMENTS ARE AVAILABLE? Medications for acne try to stop the formation of new pimples by reducing or removing the oil, bacteria, and other things (like dead skin cells) that clog the pores  They can also decrease the inflammation or irritation response of the skin to bacteria  It may take from 6 to 8 weeks (about 2 months!) before you see any improvement and know if the medication is effective  It takes the layers of skin this long to regenerate  Remember, these medications do not cure the condition--the acne improves because of the medication   Therefore, treatment must be continued in order to prevent the return of acne lesions  There are many types of acne treatments  Some are applied to the skin (topical medications) and some are taken by mouth (oral medications)  In most cases of mild acne, the doctor will start with a topical medication  There are many different topical medications that are helpful for acne  If acne is more severe and it does not respond adequately to a topical medication, or if it covers large body surface areas such as the back and/or chest, oral antibiotics such as Doxycycline or Minocycline and/or oral hormone therapy such as Oral Contraceptive Pills or Spironolactone may be prescribed  In the most severe cases, isotretinoin (Accutane) may be used  In general, it is usually best to start with acne medications that are least likely to cause side effects but are at the same time capable of addressing the specific causes for the acne  Some patients have a good result with just one medication, but many will need to use a combination of treatments: two or more different topical agents or an oral medication plus a topical medication  Another treatment used for acne may include corticosteroid injections, which are used to help relieve pain, decrease the size, and encourage the healing of large, inflamed acne nodules  Also, dermatologists sometimes perform acne surgery, using a fine needle, a pointed blade, or an instrument known as a comedone extractor to mechanically clean out clogged pores  One must always weigh the risk for inducing a scar with the potential benefits of any procedure  Prior treatment with topical retinoids can loosen whiteheads and blackheads and make it easier to physically remove such lesions  Heat-based devices, and light and laser therapy are being studied to see whether there is any role for such treatments in mild to moderate acne  At this time, there is not enough evidence to make general recommendations about their use      TOPICAL ACNE MEDICATIONS    WHAT KIND OF TOPICALS ARE THERE?  Benzoyl peroxide (BP) helps to fight inflammation and is anti-microbial (kills bacteria, viruses, and other microorganisms) and is believed to help prevent resistance of bacteria to topical antibiotics  A benzoyl peroxide wash may be recommended for use on large areas such as the chest and/or back  Mild irritation and dryness are common when first using benzoyl peroxide-containing products  Be careful because benzoyl peroxide can bleach towels and clothing!  Retinoids (such as adapalene, tretinoin, or tazarotene) unplug the oil glands by helping peel away the layers of skin and other things plugging the opening of the glands  Mild irritation and dryness are common when first using these products  Facial waxing and other skin procedures can lead to excessive irritation and should be avoided during retinoid therapy   Antibiotics fight bacteria and help decrease inflammation  Topical antibiotics commonly used in acne include clindamycin, erythromycin, and combination agents (such as clindamycin/benzoyl peroxide or erythromycin/benzoyl peroxide)  Mild irritation and dryness are common when first using these products  Typically, topical antibiotics should not be used alone as treatment for acne   Other topical agents include salicylic acid, azelaic acid, dapsone, and sulfacetamide  Mild irritation and dryness can also occur when first using these products  USING YOUR TOPICAL TREATMENTS LIKE A PRO   Apply topical medications only to clean, dry skin  Topical medications may lead to significant dryness of the affected areas  To minimize this, wait 15-20 minutes after washing before applying your topical medication   These medications work deep in the skin to prevent new breakouts  Spot treatment of individual pimples does not do much  When applying topical medications to the face, use the 5-dot method   Start by placing a small pea-sized amount of the medication on your finger  Then, place dots in each of five locations of your face: Mid-forehead, each cheek, nose, and chin  Next, rub the medication into the entire area of skin - not just on individual pimples! Try to avoid the delicate skin around your eyes and corners of your mouth   The medications are not magic! They take weeks if not months to work  Be patient and use your medicine on a daily basis or as directed for six weeks before asking if your skin looks better  Try not to miss more than one or two days each week when using your medications   If you are starting a new medication, then try using it every other night or even every third night   Gradually work up to [place] [employer] [place] a day    This will give your skin time to adjust    The same medications often come in various forms or formulations: Creams, ointments, lotions, gels, microspheres, or foams  Use the formulation that has been recommended and don't switch to other forms unless instructed  Some forms (such as alcohol based gels) may be more drying and less tolerable for certain skin types   Sometimes individual medications are not as effective as a combination of two or more agents  The doctor may need to try several medications or combinations before finding the one that is best for that patient   Moisturizer, sunscreen, and make-up may be used in conjunction with topical acne medications  In general, acne medications are applied first so they may directly contact the skin  Ask your physician to review specific application instructions!  It is especially important to always use sunscreen when using a topical retinoid or oral antibiotic  These drugs can make your skin more sensitive to the sun  In general, sunscreen gets applied AFTER any acne medications   Don't stop using your acne medications just because your acne got better  Remember, the acne is better because of the medication, and prevention is the love to treatment        HORMONAL THERAPY  Hormonal treatment is used only in females and usually consists of oral contraceptives (birth control pills)  Spironolactone is also sometimes used  HAVING PROBLEMS WITH ANY OF YOUR TREATMENTS? You should not be able to see any of the medicines on your face  If you can see a white film on your skin after you apply the medication, there is too much medicine in that area and you need to apply a thinner coat and make sure it is spread evenly on your face  If your skin gets too dry, you can apply a light (non-comedogenic) moisturizer on top of your medicine or you may switch to using the medicine every other day instead of every day  If your skin is still too irritated, you may need to switch to a milder medication  If your skin is red and very itchy, you may be allergic to the medication and you should stop using it  WHEN AND WHERE TO CALL WITH CONCERNS  We are here to help! If you experience any unusual symptoms, then stop taking or using the medication and call our office at [phone] (SKIN)  It is better to be safe than to be sorry! 3  MULTIPLE MELANOCYTIC NEVI ("Moles")     Assessment and Plan:  Based on a thorough discussion of this condition and the management approach to it (including a comprehensive discussion of the known risks, side effects and potential benefits of treatment), the patient (family) agrees to implement the following specific plan:  · Reassure benign  · Monitor for changes  · Use sun protection  Apply SPF 30 or higher at least three times a day  Wear sun protecting clothing and hats  Worrisome signs of skin malignancy discussed, questions answered  Regular self-skin check discussed  Advised to call or return to office if patient notices any spots of concern, rapidly growing/changing lesions, bleeding lesions, non-healing lesions  Advised regular SPF use

## 2021-06-10 ENCOUNTER — OFFICE VISIT (OUTPATIENT)
Dept: GYNECOLOGIC ONCOLOGY | Facility: CLINIC | Age: 26
End: 2021-06-10
Payer: COMMERCIAL

## 2021-06-10 VITALS
TEMPERATURE: 97.3 F | HEIGHT: 63 IN | SYSTOLIC BLOOD PRESSURE: 102 MMHG | HEART RATE: 78 BPM | BODY MASS INDEX: 30.74 KG/M2 | RESPIRATION RATE: 16 BRPM | WEIGHT: 173.5 LBS | DIASTOLIC BLOOD PRESSURE: 80 MMHG

## 2021-06-10 DIAGNOSIS — N87.1 MODERATE DYSPLASIA OF CERVIX (CIN II): Primary | ICD-10-CM

## 2021-06-10 PROCEDURE — 88175 CYTOPATH C/V AUTO FLUID REDO: CPT | Performed by: OBSTETRICS & GYNECOLOGY

## 2021-06-10 PROCEDURE — 57456 ENDOCERV CURETTAGE W/SCOPE: CPT | Performed by: OBSTETRICS & GYNECOLOGY

## 2021-06-10 PROCEDURE — 88305 TISSUE EXAM BY PATHOLOGIST: CPT | Performed by: PATHOLOGY

## 2021-06-10 PROCEDURE — 3008F BODY MASS INDEX DOCD: CPT | Performed by: STUDENT IN AN ORGANIZED HEALTH CARE EDUCATION/TRAINING PROGRAM

## 2021-06-10 NOTE — ASSESSMENT & PLAN NOTE
52-year-old with biopsy-proven SALTY 2 from April of 2019  Most recent evaluation in December of 2020 revealed a negative Pap, no evidence of high risk HPV, biopsy with SALTY 1 with possible SALTY 2  Repeat colposcopy with [facility] performed today  Her performance status is 0   1  Follow-up results of Pap smear, endocervical curettage and plan treatment accordingly  2  If she has persistent SALTY 2, LEEP will be scheduled

## 2021-06-10 NOTE — PROGRESS NOTES
Colposcopy    Date/Time: 6/10/2021 9:46 AM  Performed by: Nuvia Carter MD  Authorized by: Nuvia Carter MD     Consent:     Consent obtained:  Verbal    Consent given by:  Patient    Patient questions answered: yes    Pre-procedure:     Prepped with: acetic acid    Indication:     Indications: SALTY 2  Procedure:     Procedure: Colposcopy w/ endocervical curettage      Biopsy(s): yes      Location:  [facility]    Specimen to pathology: yes    Post-procedure:     Findings comment:  No visible dysplasia    Impression comment:  No visible dysplasia    Patient tolerance of procedure: Tolerated well, no immediate complications    [id number] with biopsy-proven SALTY 2 from April of 2019  Most recent evaluation in December of 2020 revealed a negative Pap, no evidence of high risk HPV, biopsy with SALTY 1 with possible SALTY 2  Repeat colposcopy with [facility] performed today  Her performance status is 0   1  Follow-up results of Pap smear, endocervical curettage and plan treatment accordingly  2  If she has persistent SALTY 2, LEEP will be scheduled

## 2021-06-11 ENCOUNTER — TELEPHONE (OUTPATIENT)
Dept: OBGYN CLINIC | Facility: CLINIC | Age: 26
End: 2021-06-11

## 2021-06-11 DIAGNOSIS — L70.0 ACNE VULGARIS: ICD-10-CM

## 2021-06-11 DIAGNOSIS — N92.0 MENORRHAGIA WITH REGULAR CYCLE: ICD-10-CM

## 2021-06-11 RX ORDER — ETONOGESTREL/ETHINYL ESTRADIOL .12-.015MG
RING, VAGINAL VAGINAL
Qty: 3 EACH | Refills: 1 | Status: SHIPPED | OUTPATIENT
Start: 2021-06-11 | End: 2021-06-30

## 2021-06-11 NOTE — TELEPHONE ENCOUNTER
Pt called and made her next annual in October  She said she went to her pharmacy to  her nuvaring and they said it needs a prior auth  The pharmacy she wants to have it filled at is [facility]

## 2021-06-16 LAB
LAB AP GYN PRIMARY INTERPRETATION: NORMAL
Lab: NORMAL

## 2021-06-23 ENCOUNTER — CONSULT (OUTPATIENT)
Dept: ENDOCRINOLOGY | Facility: CLINIC | Age: 26
End: 2021-06-23
Payer: COMMERCIAL

## 2021-06-23 VITALS
BODY MASS INDEX: 30.87 KG/M2 | WEIGHT: 174.2 LBS | DIASTOLIC BLOOD PRESSURE: 76 MMHG | SYSTOLIC BLOOD PRESSURE: 108 MMHG | HEIGHT: 63 IN | TEMPERATURE: 97.9 F | HEART RATE: 77 BPM

## 2021-06-23 DIAGNOSIS — L70.8 OTHER ACNE: ICD-10-CM

## 2021-06-23 DIAGNOSIS — K91.2 POSTSURGICAL MALABSORPTION: Chronic | ICD-10-CM

## 2021-06-23 DIAGNOSIS — Z98.84 BARIATRIC SURGERY STATUS: Primary | Chronic | ICD-10-CM

## 2021-06-23 DIAGNOSIS — E88.81 INSULIN RESISTANCE: ICD-10-CM

## 2021-06-23 DIAGNOSIS — E55.9 VITAMIN D DEFICIENCY: ICD-10-CM

## 2021-06-23 DIAGNOSIS — E28.2 PCOS (POLYCYSTIC OVARIAN SYNDROME): ICD-10-CM

## 2021-06-23 PROCEDURE — 3008F BODY MASS INDEX DOCD: CPT | Performed by: INTERNAL MEDICINE

## 2021-06-23 PROCEDURE — 1036F TOBACCO NON-USER: CPT | Performed by: INTERNAL MEDICINE

## 2021-06-23 PROCEDURE — 99244 OFF/OP CNSLTJ NEW/EST MOD 40: CPT | Performed by: INTERNAL MEDICINE

## 2021-06-23 RX ORDER — ACETAMINOPHEN 160 MG
TABLET,DISINTEGRATING ORAL
Refills: 0
Start: 2021-06-23 | End: 2021-10-04 | Stop reason: ALTCHOICE

## 2021-06-23 NOTE — PROGRESS NOTES
Nithin Castillo 30 y o  female MRN: [id number]    Encounter: [id number]      Assessment/Plan     Assessment: This is a 30y o -year-old female with PCOS, h/o bariatric surgery and   Plan:    Diagnoses and all orders for this visit:    Bariatric surgery status   obtain 24 hour urine cortisol and creatinine to rule out Cushing syndrome, because of history of morbid obesity as well as weight gain after  gastric bypass surgery,  As well as acne  will rule out thyroid problems by doing TSH and free T4   -     T4, free; Future  -     TSH, 3rd generation; Future  -     Cortisol, Free, Urine, 24 Hour; Future  -     Creatinine, urine, 24 hour; Future    PCOS (polycystic ovarian syndrome)   as patient is on hormonal therapy, it may affect workup for PCOS, and  patient understands this     will order following workup/ to rule out hyperandrogenism, as well as Cushing syndrome    -     Ambulatory referral to Endocrinology  -     Vitamin D 25 hydroxy; Future  -     Cortisol, Free, Urine, 24 Hour; Future  -     Creatinine, urine, 24 hour; Future  -     Testosterone, free, total Lab Collect; Future    Postsurgical malabsorption   managed by weight management   she is currently on vitamin-D as well as other multivitamins  Insulin resistance  Lab Results   Component Value Date    HGBA1C 5 4 07/17/2019        obtain fasting insulin as well as A1c level, as patient is history of insulin resistance in the past   -     Hemoglobin A1C; Future  -     Basic metabolic panel; Future  -     Insulin, fasting; Future    Other acne   obtain following blood work, to rule out hyperandrogenism, and adrenal related issues  -     DHEA-sulfate- Lab Collect; Future  -     Cortisol, Free, Urine, 24 Hour; Future  -     Creatinine, urine, 24 hour; Future  -     Testosterone, free, total Lab Collect; Future    Vitamin D deficiency  -     Cholecalciferol (Vitamin D3) 50 MCG (2000 UT) capsule;  Take 1 capsules daily        CC: Diabetes    History of Present Illness     HPI:    Ryder Burgess  Is 10-year-old woman,  With medical history of obesity, history of bariatric surgery for weight loss, postsurgical malabsorption,, history of polycystic ovarian disease, acne is here for evaluation of weight gain post gastric bypass surgery, as well as PCOS  patient has been managed on  Hormonal therapy ( nuvaring )  For contraception  She got the first menstruation at age of 13 yrs old  She had normal regular periods, she was started on OC pills at gar of 13/   She was started on spironolactone  Since 1 year for acne  For Dermatology  she complains of weight gain, 12 lb recently in last 3-4 months, in the she is keeping low-calorie diet and exercising  she denies history of thyroid problems in the past    she denies history of adrenal problems in the past    she denies proximal muscle weakness, hirsutism, easy bruising     takes vitamin-D 3 supplementation 2000 International Units daily, for vitamin-D deficiency  Results for Seth Caruso (MRN [id number]) as of 6/23/2021 08:10   Ref   Range 5/6/2021 09:59   Sodium Latest Ref Range: 136 - 145 mmol/L 143   Potassium Latest Ref Range: 3 5 - 5 3 mmol/L 4 1   Chloride Latest Ref Range: 100 - 108 mmol/L 106   CO2 Latest Ref Range: 21 - 32 mmol/L 28   Anion Gap Latest Ref Range: 4 - 13 mmol/L 9   BUN Latest Ref Range: 5 - 25 mg/dL 17   Creatinine Latest Ref Range: 0 60 - 1 30 mg/dL 0 84   GLUCOSE FASTING Latest Ref Range: 65 - 99 mg/dL 76   Calcium Latest Ref Range: 8 3 - 10 1 mg/dL 8 5   AST Latest Ref Range: 5 - 45 U/L 11   ALT Latest Ref Range: 12 - 78 U/L 16   Alkaline Phosphatase Latest Ref Range: 46 - 116 U/L 67   Total Protein Latest Ref Range: 6 4 - 8 2 g/dL 7 0   Albumin Latest Ref Range: 3 5 - 5 0 g/dL 3 6   TOTAL BILIRUBIN Latest Ref Range: 0 20 - 1 00 mg/dL 0 32   eGFR Latest Units: ml/min/1 73sq m 96   Iron Latest Ref Range: 50 - 170 ug/dL 75   Ferritin Latest Ref Range: 8 - 388 ng/mL 51 Iron Saturation Latest Units: % 19   TIBC Latest Ref Range: 250 - 450 ug/dL 402   Folate Latest Ref Range: 3 1 - 17 5 ng/mL 17 1   Vit D, 25-Hydroxy Latest Ref Range: 30 0 - 100 0 ng/mL 31 5   VITAMIN A LEVEL Latest Ref Range: 18 9 - 57 3 ug/dL 55 9   VITAMIN B1, WHOLE BLOOD Latest Ref Range: 66 5 - 200 0 nmol/L 148 8   Vitamin B-12 Latest Ref Range: 100 - 900 pg/mL 391   ZINC Latest Ref Range: 44 - 115 ug/dL 107     Review of Systems   Constitutional: Positive for activity change and unexpected weight change  Negative for diaphoresis, fatigue and fever  Complains of acne   HENT: Negative  Eyes: Negative for visual disturbance  Respiratory: Negative for cough, chest tightness and shortness of breath  Cardiovascular: Negative for chest pain, palpitations and leg swelling  Gastrointestinal: Negative for abdominal pain, constipation, diarrhea, nausea and vomiting  Endocrine: Negative for cold intolerance, heat intolerance, polydipsia, polyphagia and polyuria  Genitourinary: Negative for dysuria, enuresis, frequency and urgency  Musculoskeletal: Negative for arthralgias and myalgias  Skin: Negative for pallor, rash and wound  Allergic/Immunologic: Negative  Neurological: Negative for dizziness, tremors, weakness and numbness  Hematological: Negative  Psychiatric/Behavioral: Negative          Historical Information   Past Medical History:   Diagnosis Date    Abnormal Pap smear of cervix     2018/2019 - HSIL/CIN2-3    Acne     Asthma     exercise induced asthma/no recent problems    Bariatric surgery status     Chronic thoracic back pain 11/9/2017    Eczema     Exercises 1 to 2 times per week     occas 3x/week    External hemorrhoids     last assessed 8/15/12; resolved 6/13/14    Fatigue     last assessed 4/8/13; resolved 6/13/14   [facility] 8/19/2019    HPV (human papilloma virus) infection     2019 (+) HRHPV type 16/18 negative    Obesity     PCOS (polycystic ovarian syndrome)     Postsurgical malabsorption      Past Surgical History:   Procedure Laterality Date    EGD      SC BREAST REDUCTION Bilateral 2/22/2018    Procedure: BREAST REDUCTION;  Surgeon: Gianna Cottrell MD;  [facility];  Service: Plastics    SC LAP, DEANDRE RESTRICT PROC, LONGITUDINAL GASTRECTOMY N/A 8/13/2019    Procedure: GASTRECTOMY LAPAROSCOPIC SLEEVE WITH ROBOTICS;  Surgeon: Silvana Fulton MD;  [facility];  Service: Bariatrics    REDUCTION MAMMAPLASTY      WISDOM TOOTH EXTRACTION       Social History   Social History     Substance and Sexual Activity   Alcohol Use Yes    Comment: social     Social History     Substance and Sexual Activity   Drug Use No     Social History     Tobacco Use   Smoking Status Never Smoker   Smokeless Tobacco Never Used     Family History:   Family History   Problem Relation Age of Onset    Clotting disorder Mother         with menstration    Diabetes Father     Breast cancer Maternal Aunt     Cancer Maternal Aunt         breast cancer    Hypertension Neg Hx     Heart disease Neg Hx     Thyroid disease Neg Hx     Stroke Neg Hx        Meds/Allergies   Current Outpatient Medications   Medication Sig Dispense Refill    albuterol (Ventolin HFA) 90 mcg/act inhaler Inhale 2 puffs every 6 (six) hours as needed for wheezing 1 Inhaler 1    clindamycin-benzoyl peroxide (BENZACLIN) gel Apply to face twice daily for acne (Patient taking differently: Apply to face prn for acne) 25 g 1    etonogestrel-ethinyl estradiol (NUVARING) 0 12-0 015 MG/24HR vaginal ring INSERT 1 RING VAGINALLY AS DIRECTED   REMOVE AFTER 3 WEEKS & WAIT 7 DAYS BEFORE INSERTING A NEW RING 3 each 2    hydrocortisone 2 5 % ointment Apply topically 2 (two) times a day 30 g 2    loratadine (CLARITIN) 10 mg tablet Take 1 tablet (10 mg total) by mouth daily 30 tablet 5    pimecrolimus (ELIDEL) 1 % cream Apply topically 2 (two) times a day To upper eyelids 30 g 3    spironolactone (ALDACTONE) 50 mg tablet TAKE 3 TABLETS BY MOUTH EVERY DAY 90 tablet 3    Cholecalciferol (Vitamin D3) 50 MCG (2000 UT) capsule Take 1 capsules daily  0    loratadine-pseudoephedrine (CLARITIN-D 12-HOUR) 5-120 mg per tablet Take 1 tablet by mouth 2 (two) times a day (Patient not taking: Reported on 6/23/2021) 60 tablet 0    NuvaRing 0 12-0 015 MG/24HR vaginal ring Insert vaginally and leave in place for 3 consecutive weeks, then remove for 1 week  (Patient not taking: Reported on 6/23/2021) 3 each 1     No current facility-administered medications for this visit  No Known Allergies    Objective   Vitals: Blood pressure 108/76, pulse 77, temperature 97 9 °F (36 6 °C), height 5' 3" (1 6 m), weight 79 kg (174 lb 3 2 oz)  Physical Exam  Vitals reviewed  Constitutional:       Appearance: Normal appearance  HENT:      Head: Normocephalic and atraumatic  Nose: Nose normal       Mouth/Throat:      Mouth: Mucous membranes are moist    Eyes:      Extraocular Movements: Extraocular movements intact  Pupils: Pupils are equal, round, and reactive to light  Cardiovascular:      Rate and Rhythm: Normal rate and regular rhythm  Pulmonary:      Effort: Pulmonary effort is normal       Breath sounds: Normal breath sounds  Abdominal:      General: Bowel sounds are normal       Palpations: Abdomen is soft  Musculoskeletal:         General: Normal range of motion  Cervical back: Normal range of motion and neck supple  Right lower leg: No edema  Left lower leg: No edema  Skin:     General: Skin is warm  Findings: No rash  Comments: Complains of acne and facial plethora   Neurological:      General: No focal deficit present  Mental Status: She is alert and oriented to person, place, and time  Psychiatric:         Mood and Affect: Mood normal          Behavior: Behavior normal          The history was obtained from the review of the chart, patient      Lab Results:   Lab Results   Component Value Date/Time    WBC 6 91 05/06/2021 09:59 AM    Hemoglobin 13 6 05/06/2021 09:59 AM    Hematocrit 41 0 05/06/2021 09:59 AM    MCV 93 05/06/2021 09:59 AM    Platelets 758 57/15/4860 09:59 AM    BUN 17 05/06/2021 09:59 AM    Potassium 4 1 05/06/2021 09:59 AM    Chloride 106 05/06/2021 09:59 AM    CO2 28 05/06/2021 09:59 AM    Creatinine 0 84 05/06/2021 09:59 AM    AST 11 05/06/2021 09:59 AM    ALT 16 05/06/2021 09:59 AM    Albumin 3 6 05/06/2021 09:59 AM           Imaging Studies: I have personally reviewed pertinent reports  Portions of the record may have been created with voice recognition software  Occasional wrong word or "sound a like" substitutions may have occurred due to the inherent limitations of voice recognition software  Read the chart carefully and recognize, using context, where substitutions have occurred

## 2021-06-29 DIAGNOSIS — N92.0 MENORRHAGIA WITH REGULAR CYCLE: ICD-10-CM

## 2021-06-29 DIAGNOSIS — L70.0 ACNE VULGARIS: ICD-10-CM

## 2021-06-29 RX ORDER — ETONOGESTREL AND ETHINYL ESTRADIOL 11.7; 2.7 MG/1; MG/1
INSERT, EXTENDED RELEASE VAGINAL
Qty: 3 EACH | Refills: 0 | Status: SHIPPED | OUTPATIENT
Start: 2021-06-29 | End: 2021-06-30

## 2021-06-30 ENCOUNTER — LAB (OUTPATIENT)
Dept: LAB | Facility: CLINIC | Age: 26
End: 2021-06-30
Payer: COMMERCIAL

## 2021-06-30 DIAGNOSIS — L70.0 ACNE VULGARIS: ICD-10-CM

## 2021-06-30 DIAGNOSIS — E28.2 PCOS (POLYCYSTIC OVARIAN SYNDROME): ICD-10-CM

## 2021-06-30 DIAGNOSIS — L70.8 OTHER ACNE: ICD-10-CM

## 2021-06-30 DIAGNOSIS — Z98.84 BARIATRIC SURGERY STATUS: Chronic | ICD-10-CM

## 2021-06-30 DIAGNOSIS — N92.0 MENORRHAGIA WITH REGULAR CYCLE: ICD-10-CM

## 2021-06-30 LAB
CREAT 24H UR-MRATE: 0.9 G/24HR (ref 0.6–1.8)
SPECIMEN VOL UR: 500 ML

## 2021-06-30 PROCEDURE — 82570 ASSAY OF URINE CREATININE: CPT

## 2021-06-30 PROCEDURE — 82530 CORTISOL FREE: CPT

## 2021-06-30 RX ORDER — ETONOGESTREL/ETHINYL ESTRADIOL .12-.015MG
RING, VAGINAL VAGINAL
Qty: 3 EACH | Refills: 1 | Status: SHIPPED | OUTPATIENT
Start: 2021-06-30 | End: 2021-07-27 | Stop reason: SDUPTHER

## 2021-07-06 LAB
CORTIS F 24H UR-MRATE: 17 UG/24 HR (ref 6–42)
CORTIS F UR-MCNC: 34 UG/L

## 2021-07-23 ENCOUNTER — APPOINTMENT (OUTPATIENT)
Dept: LAB | Facility: CLINIC | Age: 26
End: 2021-07-23
Payer: COMMERCIAL

## 2021-07-23 DIAGNOSIS — L70.8 OTHER ACNE: ICD-10-CM

## 2021-07-23 DIAGNOSIS — E88.81 INSULIN RESISTANCE: ICD-10-CM

## 2021-07-23 DIAGNOSIS — E28.2 PCOS (POLYCYSTIC OVARIAN SYNDROME): ICD-10-CM

## 2021-07-23 DIAGNOSIS — Z98.84 BARIATRIC SURGERY STATUS: Chronic | ICD-10-CM

## 2021-07-23 LAB
25(OH)D3 SERPL-MCNC: 52.5 NG/ML (ref 30–100)
ANION GAP SERPL CALCULATED.3IONS-SCNC: 9 MMOL/L (ref 4–13)
BUN SERPL-MCNC: 15 MG/DL (ref 5–25)
CALCIUM SERPL-MCNC: 8.7 MG/DL (ref 8.3–10.1)
CHLORIDE SERPL-SCNC: 106 MMOL/L (ref 100–108)
CO2 SERPL-SCNC: 25 MMOL/L (ref 21–32)
CREAT SERPL-MCNC: 0.85 MG/DL (ref 0.6–1.3)
EST. AVERAGE GLUCOSE BLD GHB EST-MCNC: 91 MG/DL
GFR SERPL CREATININE-BSD FRML MDRD: 94 ML/MIN/1.73SQ M
GLUCOSE P FAST SERPL-MCNC: 80 MG/DL (ref 65–99)
HBA1C MFR BLD: 4.8 %
INSULIN SERPL-ACNC: 4.6 MU/L (ref 3–25)
POTASSIUM SERPL-SCNC: 4 MMOL/L (ref 3.5–5.3)
SODIUM SERPL-SCNC: 140 MMOL/L (ref 136–145)
T4 FREE SERPL-MCNC: 0.96 NG/DL (ref 0.76–1.46)
TSH SERPL DL<=0.05 MIU/L-ACNC: 1.33 UIU/ML (ref 0.36–3.74)

## 2021-07-23 PROCEDURE — 82306 VITAMIN D 25 HYDROXY: CPT

## 2021-07-23 PROCEDURE — 84402 ASSAY OF FREE TESTOSTERONE: CPT

## 2021-07-23 PROCEDURE — 83525 ASSAY OF INSULIN: CPT

## 2021-07-23 PROCEDURE — 36415 COLL VENOUS BLD VENIPUNCTURE: CPT

## 2021-07-23 PROCEDURE — 84443 ASSAY THYROID STIM HORMONE: CPT

## 2021-07-23 PROCEDURE — 83036 HEMOGLOBIN GLYCOSYLATED A1C: CPT

## 2021-07-23 PROCEDURE — 80048 BASIC METABOLIC PNL TOTAL CA: CPT

## 2021-07-23 PROCEDURE — 82627 DEHYDROEPIANDROSTERONE: CPT

## 2021-07-23 PROCEDURE — 84439 ASSAY OF FREE THYROXINE: CPT

## 2021-07-23 PROCEDURE — 84403 ASSAY OF TOTAL TESTOSTERONE: CPT

## 2021-07-24 LAB
DHEA-S SERPL-MCNC: 102 UG/DL (ref 84.8–378)
TESTOST FREE SERPL-MCNC: 0.5 PG/ML (ref 0–4.2)
TESTOST SERPL-MCNC: 8 NG/DL (ref 13–71)

## 2021-07-27 DIAGNOSIS — N92.0 MENORRHAGIA WITH REGULAR CYCLE: ICD-10-CM

## 2021-07-27 DIAGNOSIS — L70.0 ACNE VULGARIS: ICD-10-CM

## 2021-07-27 RX ORDER — ETONOGESTREL/ETHINYL ESTRADIOL .12-.015MG
RING, VAGINAL VAGINAL
Qty: 3 EACH | Refills: 0 | Status: SHIPPED | OUTPATIENT
Start: 2021-07-27 | End: 2021-08-31 | Stop reason: SDUPTHER

## 2021-08-31 DIAGNOSIS — L70.0 ACNE VULGARIS: ICD-10-CM

## 2021-08-31 DIAGNOSIS — N92.0 MENORRHAGIA WITH REGULAR CYCLE: ICD-10-CM

## 2021-08-31 RX ORDER — ETONOGESTREL/ETHINYL ESTRADIOL .12-.015MG
RING, VAGINAL VAGINAL
Qty: 3 EACH | Refills: 0 | Status: SHIPPED | OUTPATIENT
Start: 2021-08-31 | End: 2021-10-04 | Stop reason: SDUPTHER

## 2021-09-01 ENCOUNTER — TELEPHONE (OUTPATIENT)
Dept: OBGYN CLINIC | Facility: CLINIC | Age: 26
End: 2021-09-01

## 2021-09-01 NOTE — TELEPHONE ENCOUNTER
Pt aware pharmacy is ordering her medication, does not have brand name in the store  They will contact her once they receive  Patient aware can [facility] another [facility] to see if has in stock and can have transferred  Patient was to start Sunday again with her ring and forgot to call to have refilled  Patient aware can restart if 2 weeks no intercourse and neg upt, or wait until next cycle

## 2021-09-09 ENCOUNTER — OFFICE VISIT (OUTPATIENT)
Dept: BARIATRICS | Facility: CLINIC | Age: 26
End: 2021-09-09

## 2021-09-09 VITALS — HEIGHT: 63 IN | WEIGHT: 184.31 LBS | BODY MASS INDEX: 32.66 KG/M2

## 2021-09-09 DIAGNOSIS — K91.2 POSTSURGICAL MALABSORPTION: Chronic | ICD-10-CM

## 2021-09-09 DIAGNOSIS — Z98.84 BARIATRIC SURGERY STATUS: Primary | ICD-10-CM

## 2021-09-09 DIAGNOSIS — R63.5 ABNORMAL WEIGHT GAIN: ICD-10-CM

## 2021-09-09 DIAGNOSIS — E66.9 OBESITY, CLASS I, BMI 30-34.9: ICD-10-CM

## 2021-09-09 PROCEDURE — WEIGHT: Performed by: DIETITIAN, REGISTERED

## 2021-09-09 PROCEDURE — RECHECK: Performed by: DIETITIAN, REGISTERED

## 2021-09-09 NOTE — PROGRESS NOTES
Bariatric Follow Up Nutrition Note    Type of surgery  Vertical sleeve gastrectomy  Surgery Date: 8/13/2019  24 months  post-op  Surgeon: Dr Marta Arora  30 y o   female    Ht 5' 3" (1 6 m)   Wt 83 6 kg (184 lb 5 oz)   BMI 32 65 kg/m²    Pt gained 14# since last appointment in April   She had consult with endocrinology and PCOS was ruled out    Continues to take spirolactone for acne / antiandrogen       Wt Readings from Last 3 Encounters:   06/23/21 79 kg (174 lb 3 2 oz)   06/10/21 78 7 kg (173 lb 8 oz)   06/02/21 78 kg (172 lb)       [place]  [place] Equation:  1332  Wt maintenance 2046 kcal per day   Estimated calories for weight loss 4004-7511 kcal  (1 to 2# per wk wt loss - sedentary )  Estimated protein needs 64-77 grams(1 0-1 2 gms/kg IBW )   Estimated fluid needs 2245 ml (35 ml/kg IBW )  75 ounces     Pt had body comp done today with [facility]  REE- 1705 kcal   2761 kcal for weight maintenance   Fat mass high at 46 1%  VAT level normal at 1 3  Waist circumference elevated at 34 inches ( 31 normal )     Weight on Day of Weight Loss Surgery: 240 3 lbs   Weight in (lb) to have BMI = 25: 141 1#  Post-Op Wt Loss: 59 7#/ 58% EBWL in 24 month(s)  Sergio weight = 158#      Review of History and Medications   Past Medical History:   Diagnosis Date    Abnormal Pap smear of cervix     2018/2019 - HSIL/CIN2-3    Acne     Asthma     exercise induced asthma/no recent problems    Bariatric surgery status     Chronic thoracic back pain 11/9/2017    Eczema     Exercises 1 to 2 times per week     occas 3x/week    External hemorrhoids     last assessed 8/15/12; resolved 6/13/14    Fatigue     last assessed 4/8/13; resolved 6/13/14   [facility] discharge follow[facility]up 8/19/2019    HPV (human papilloma virus) infection     2019 (+) HRHPV type 16/18 negative    Obesity     PCOS (polycystic ovarian syndrome)     Postsurgical malabsorption      Past Surgical History:   Procedure Laterality Date    EGD      AR BREAST REDUCTION Bilateral 2/22/2018    Procedure: BREAST REDUCTION;  Surgeon: Jordana Reeves MD;  [facility];  Service: Plastics    AR LAP, DEANDRE RESTRICT PROC, LONGITUDINAL GASTRECTOMY N/A 8/13/2019    Procedure: GASTRECTOMY LAPAROSCOPIC SLEEVE WITH ROBOTICS;  Surgeon: Russell Molina MD;  [facility];  Service: Bariatrics    REDUCTION MAMMAPLASTY      WISDOM TOOTH EXTRACTION       Social History     Socioeconomic History    Marital status: Single     Spouse name: Not on file    Number of children: Not on file    Years of education: Not on file    Highest education level: Not on file   Occupational History    Not on file   Tobacco Use    Smoking status: Never Smoker    Smokeless tobacco: Never Used   Vaping Use    Vaping Use: Never used   Substance and Sexual Activity    Alcohol use: Yes     Comment: social    Drug use: No    Sexual activity: Yes     Partners: Male     Birth control/protection: Ring   Other Topics Concern    Not on file   Social History Narrative    Not on file     Social Determinants of Health     Financial Resource Strain:     Difficulty of Paying Living Expenses:    Food Insecurity:     Worried About Running Out of Food in the Last Year:     Ran Out of Food in the Last Year:    Transportation Needs:     Lack of Transportation (Medical):      Lack of Transportation (Non-Medical):    Physical Activity:     Days of Exercise per Week:     Minutes of Exercise per Session:    Stress:     Feeling of Stress :    Social Connections:     Frequency of Communication with Friends and Family:     Frequency of Social Gatherings with Friends and Family:     Attends Oriental orthodox Services:     Active Member of Clubs or Organizations:     Attends Club or Organization Meetings:     Marital Status:    Intimate Partner Violence:     Fear of Current or Ex-Partner:     Emotionally Abused:     Physically Abused:     Sexually Abused: Current Outpatient Medications:     albuterol (Ventolin HFA) 90 mcg/act inhaler, Inhale 2 puffs every 6 (six) hours as needed for wheezing, Disp: 1 Inhaler, Rfl: 1    Cholecalciferol (Vitamin D3) 50 MCG (2000 UT) capsule, Take 1 capsules daily, Disp: , Rfl: 0    clindamycin-benzoyl peroxide (BENZACLIN) gel, Apply to face twice daily for acne (Patient taking differently: Apply to face prn for acne), Disp: 25 g, Rfl: 1    hydrocortisone 2 5 % ointment, Apply topically 2 (two) times a day, Disp: 30 g, Rfl: 2    loratadine (CLARITIN) 10 mg tablet, Take 1 tablet (10 mg total) by mouth daily, Disp: 30 tablet, Rfl: 5    loratadine-pseudoephedrine (CLARITIN-D 12-HOUR) 5-120 mg per tablet, Take 1 tablet by mouth 2 (two) times a day (Patient not taking: Reported on 6/23/2021), Disp: 60 tablet, Rfl: 0    NuvaRing 0 12-0 015 MG/24HR vaginal ring, Insert vaginally and leave in place for 3 consecutive weeks, then remove for 1 week , Disp: 3 each, Rfl: 0    pimecrolimus (ELIDEL) 1 % cream, Apply topically 2 (two) times a day To upper eyelids, Disp: 30 g, Rfl: 3    spironolactone (ALDACTONE) 50 mg tablet, TAKE 3 TABLETS BY MOUTH EVERY DAY, Disp: 90 tablet, Rfl: 3    Food Intake and Lifestyle Assessment   Food Intake Assessment completed via [employer]kit   Via Money Toolkit 811 kcal 59 grams of carb, 30 grams and 67 grams of protein   Breakfast: 8 am Premier protien   Snack: cheese stick   Lunch: 12 noon low carb bread sandwich ( 1/2 saves other half for snack )   Snack: 3 pm to 4 pm ( 1/2 sandwich)   Dinner: 5-7 pm - lean protein and vegetables - sometimes rice and beans   Snack: popcorn or goldfish crackers   Beverage intake: water  Diet texture/stage: regular  Protein supplement: one per day   Estimated protein intake per day: 70-80 grams per day   Estimated fluid intake per day: 64 ounces or more per day   Meals eaten away from home: rarely   Typical meal pattern: 3 meals per day and 1-2 snacks per day  Eating Behaviors: Appropriate diet advancement, Appropriate portion sizes and Does not drink with meals and waits 30-minutes after meal before resuming drinking    Food allergies or intolerances: none noted   Cultural or Yarsani considerations: n/a     Physical Assessment  Nutrition Related Findings  Had some bloating which resolved with low gluten diet     Physical Activity  Types of exercise: Biking  Walking  Running   Has a [place] - hits HR of 171 during exercise   This decreased over the summer with lack of routine  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 is back into her routine   Current physical limitations: none     Psychosocial Assessment   Support systems: parent(s) sibling(s) friend(s)  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     Nutrition Diagnosis- continued   Diagnosis: Unintentional weight gain   Related to: Altered GI function and diagnosis of PCOS  As Evidenced by: BMI >25     Nutrition Prescription: Recommend the following diet  1200 kcal, 90 grams of protein, 120 grams of carb, 40 grams of fat, 15 grams of fiber and 30 grams of sugar     Interventions and Teaching   Patient educated on post-op nutrition guidelines  Patient educated and handouts provided    Surgical changes to stomach / GI  Capacity of post-surgery stomach  Adequate hydration  Expected weight loss  Weight loss plateaus/ possibility of weight regain  Exercise  Nutrition considerations after surgery  Protein supplements  Dietary and lifestyle changes  Techniques for self monitoring and keeping daily food journal  Vitamin / Mineral supplementation of Multivitamin with minerals, Calcium, Vitamin B12, Iron, Fat Soluble vitamins and Vitamin D  Has nuvo ring for birth control and regulation of periods     Reviewed food log and body comp results     Education provided to: patient    Barriers to learning: No barriers identified    Readiness to change: action    Comprehension: demonstrated understanding and verbalizes understanding     Expected Compliance: good    Evaluation/Monitoring   Eating pattern as discussed Tolerance of nutrition prescription Body weight Lab values Physical activity  Patient admits to getting out of her routine over the summer  She works as a teacher and thrives on routine  She is getting back on track with food logging and exercise  Exercises before and after work  She has been snacking grazing more and will make more mindful choices  Relived that she no longer has PCOS but frustrated with her weight gain as she feels that she is not doing anything all that different  Explained the importance of food logging to be mindful of her food intake  Patient does admit to some stress eating and will f/u with LCSW for assistance for stress reduction without food  Reviewed body comp stats and encouraged adding strength training  Pt has [employer] and has some on line courses she can take for strength training , in addition to what she does on the bike  Pt would like to follow up with medical weight management as she worked with [facility] prior to her bariatric surgery     Goals  Food journal, Exercise 30 minutes 5 times per week, Eat 3 meals per day and one planned snack    Food log Baritastic -1200 calories 90 grams protein   Add strength training at least twice per week to build lean body mass  Appointment with LCSW to address stress eating  Referral to [facility] after LCSW appointment   Follow up with RD as determined by [facility] provider       Time Spent:   45 Minutes

## 2021-09-29 DIAGNOSIS — L70.8 OTHER ACNE: ICD-10-CM

## 2021-09-29 RX ORDER — SPIRONOLACTONE 50 MG/1
TABLET, FILM COATED ORAL
Qty: 90 TABLET | Refills: 2 | Status: SHIPPED | OUTPATIENT
Start: 2021-09-29 | End: 2021-12-13 | Stop reason: SDUPTHER

## 2021-10-04 ENCOUNTER — ANNUAL EXAM (OUTPATIENT)
Dept: OBGYN CLINIC | Facility: CLINIC | Age: 26
End: 2021-10-04
Payer: COMMERCIAL

## 2021-10-04 VITALS
DIASTOLIC BLOOD PRESSURE: 80 MMHG | BODY MASS INDEX: 32.5 KG/M2 | SYSTOLIC BLOOD PRESSURE: 118 MMHG | HEIGHT: 63 IN | WEIGHT: 183.4 LBS

## 2021-10-04 DIAGNOSIS — Z01.419 ENCOUNTER FOR GYNECOLOGICAL EXAMINATION (GENERAL) (ROUTINE) WITHOUT ABNORMAL FINDINGS: Primary | ICD-10-CM

## 2021-10-04 DIAGNOSIS — N92.0 MENORRHAGIA WITH REGULAR CYCLE: ICD-10-CM

## 2021-10-04 DIAGNOSIS — L70.0 ACNE VULGARIS: ICD-10-CM

## 2021-10-04 PROCEDURE — 0503F POSTPARTUM CARE VISIT: CPT | Performed by: PHYSICIAN ASSISTANT

## 2021-10-04 PROCEDURE — 99395 PREV VISIT EST AGE 18-39: CPT | Performed by: PHYSICIAN ASSISTANT

## 2021-10-04 RX ORDER — ETONOGESTREL/ETHINYL ESTRADIOL .12-.015MG
RING, VAGINAL VAGINAL
Qty: 3 EACH | Refills: 3 | Status: SHIPPED | OUTPATIENT
Start: 2021-10-04 | End: 2021-11-22

## 2021-10-08 ENCOUNTER — OFFICE VISIT (OUTPATIENT)
Dept: BARIATRICS | Facility: CLINIC | Age: 26
End: 2021-10-08

## 2021-10-08 DIAGNOSIS — E66.9 OBESITY, CLASS I, BMI 30-34.9: Primary | ICD-10-CM

## 2021-10-08 PROCEDURE — RECHECK

## 2021-10-16 ENCOUNTER — IMMUNIZATIONS (OUTPATIENT)
Dept: FAMILY MEDICINE CLINIC | Facility: CLINIC | Age: 26
End: 2021-10-16
Payer: COMMERCIAL

## 2021-10-16 DIAGNOSIS — Z23 ENCOUNTER FOR IMMUNIZATION: Primary | ICD-10-CM

## 2021-10-16 PROCEDURE — 90471 IMMUNIZATION ADMIN: CPT

## 2021-10-16 PROCEDURE — 90686 IIV4 VACC NO PRSV 0.5 ML IM: CPT

## 2021-11-09 ENCOUNTER — OFFICE VISIT (OUTPATIENT)
Dept: BARIATRICS | Facility: CLINIC | Age: 26
End: 2021-11-09
Payer: COMMERCIAL

## 2021-11-09 VITALS
TEMPERATURE: 99.1 F | RESPIRATION RATE: 14 BRPM | WEIGHT: 186 LBS | BODY MASS INDEX: 32.96 KG/M2 | SYSTOLIC BLOOD PRESSURE: 118 MMHG | DIASTOLIC BLOOD PRESSURE: 70 MMHG | HEIGHT: 63 IN | HEART RATE: 77 BPM

## 2021-11-09 DIAGNOSIS — K91.2 POSTSURGICAL MALABSORPTION: Chronic | ICD-10-CM

## 2021-11-09 DIAGNOSIS — Z79.899 MEDICATION MANAGEMENT: ICD-10-CM

## 2021-11-09 DIAGNOSIS — Z48.815 ENCOUNTER FOR SURGICAL AFTERCARE FOLLOWING SURGERY OF DIGESTIVE SYSTEM: Chronic | ICD-10-CM

## 2021-11-09 DIAGNOSIS — E66.9 OBESITY, CLASS I, BMI 30-34.9: Primary | ICD-10-CM

## 2021-11-09 PROBLEM — E66.811 OBESITY, CLASS I, BMI 30-34.9: Status: ACTIVE | Noted: 2020-09-16

## 2021-11-09 LAB — SL AMB POCT URINE HCG: NEGATIVE

## 2021-11-09 PROCEDURE — 81025 URINE PREGNANCY TEST: CPT | Performed by: PHYSICIAN ASSISTANT

## 2021-11-09 PROCEDURE — 99214 OFFICE O/P EST MOD 30 MIN: CPT | Performed by: PHYSICIAN ASSISTANT

## 2021-11-09 RX ORDER — TOPIRAMATE 50 MG/1
50 TABLET, FILM COATED ORAL 2 TIMES DAILY
Qty: 60 TABLET | Refills: 1 | Status: SHIPPED | OUTPATIENT
Start: 2021-11-09 | End: 2022-02-10 | Stop reason: SDUPTHER

## 2021-11-10 ENCOUNTER — TELEPHONE (OUTPATIENT)
Dept: FAMILY MEDICINE CLINIC | Facility: CLINIC | Age: 26
End: 2021-11-10

## 2021-11-10 PROCEDURE — U0003 INFECTIOUS AGENT DETECTION BY NUCLEIC ACID (DNA OR RNA); SEVERE ACUTE RESPIRATORY SYNDROME CORONAVIRUS 2 (SARS-COV-2) (CORONAVIRUS DISEASE [COVID-19]), AMPLIFIED PROBE TECHNIQUE, MAKING USE OF HIGH THROUGHPUT TECHNOLOGIES AS DESCRIBED BY CMS-2020-01-R: HCPCS | Performed by: FAMILY MEDICINE

## 2021-11-10 PROCEDURE — U0005 INFEC AGEN DETEC AMPLI PROBE: HCPCS | Performed by: FAMILY MEDICINE

## 2021-11-11 ENCOUNTER — OFFICE VISIT (OUTPATIENT)
Dept: FAMILY MEDICINE CLINIC | Facility: CLINIC | Age: 26
End: 2021-11-11
Payer: COMMERCIAL

## 2021-11-11 ENCOUNTER — TELEPHONE (OUTPATIENT)
Dept: FAMILY MEDICINE CLINIC | Facility: CLINIC | Age: 26
End: 2021-11-11

## 2021-11-11 VITALS
HEART RATE: 75 BPM | SYSTOLIC BLOOD PRESSURE: 110 MMHG | HEIGHT: 63 IN | BODY MASS INDEX: 32.57 KG/M2 | RESPIRATION RATE: 12 BRPM | OXYGEN SATURATION: 100 % | TEMPERATURE: 99 F | WEIGHT: 183.8 LBS | DIASTOLIC BLOOD PRESSURE: 80 MMHG

## 2021-11-11 DIAGNOSIS — Z00.00 ANNUAL PHYSICAL EXAM: Primary | ICD-10-CM

## 2021-11-11 DIAGNOSIS — J02.9 SORE THROAT: ICD-10-CM

## 2021-11-11 PROCEDURE — 99395 PREV VISIT EST AGE 18-39: CPT | Performed by: NURSE PRACTITIONER

## 2021-11-11 RX ORDER — AMOXICILLIN 875 MG/1
875 TABLET, COATED ORAL 2 TIMES DAILY
Qty: 20 TABLET | Refills: 0 | Status: SHIPPED | OUTPATIENT
Start: 2021-11-11 | End: 2021-11-21

## 2021-12-13 DIAGNOSIS — L70.8 OTHER ACNE: ICD-10-CM

## 2021-12-13 RX ORDER — SPIRONOLACTONE 50 MG/1
150 TABLET, FILM COATED ORAL DAILY
Qty: 90 TABLET | Refills: 2 | Status: SHIPPED | OUTPATIENT
Start: 2021-12-13

## 2021-12-13 NOTE — TELEPHONE ENCOUNTER
Patient called needing [facility] [place] order attached to encounter

## 2022-01-07 ENCOUNTER — OFFICE VISIT (OUTPATIENT)
Dept: BARIATRICS | Facility: CLINIC | Age: 27
End: 2022-01-07
Payer: COMMERCIAL

## 2022-01-07 VITALS
HEIGHT: 63 IN | HEART RATE: 74 BPM | DIASTOLIC BLOOD PRESSURE: 66 MMHG | SYSTOLIC BLOOD PRESSURE: 110 MMHG | BODY MASS INDEX: 31.86 KG/M2 | WEIGHT: 179.8 LBS | TEMPERATURE: 98.3 F

## 2022-01-07 DIAGNOSIS — K91.2 POSTSURGICAL MALABSORPTION: ICD-10-CM

## 2022-01-07 DIAGNOSIS — Z98.84 BARIATRIC SURGERY STATUS: ICD-10-CM

## 2022-01-07 DIAGNOSIS — E66.9 OBESITY, CLASS I, BMI 30-34.9: Primary | ICD-10-CM

## 2022-01-07 PROCEDURE — 99214 OFFICE O/P EST MOD 30 MIN: CPT | Performed by: PHYSICIAN ASSISTANT

## 2022-01-07 NOTE — PATIENT INSTRUCTIONS
Goals:  Food log (ie ) www myfitnesspal com,sparkpeople  com,loseit com,calorieking  com,etc  baritastic-weigh and measure  No sugary beverages  At least 64oz of water daily  Increase physical activity by 10 minutes daily  Gradually increase physical activity to a goal of 5 days per week for 30 minutes of MODERATE intensity PLUS 2 days per week of FULL BODY resistance training-  4257-3005 calories per day    The potential side effects of topiramate may include numbness or tingling, fatigue, upper respiratory infection symptoms, depression/anxiety, changes in taste, confusion, abdominal upset/heartburn, and trouble sleeping  Notify the provider with any changes in mood  ER with thoughts of harming yourself/others  Notify the provider with any changes in vision  Patient is aware of the teratogenicity of Topamax and condom use was recommended while on the medication

## 2022-01-07 NOTE — PROGRESS NOTES
Assessment/Plan:    -Initial weight loss goal of 5-10% weight loss for improved health  -Screening labs 7/2021 - will order her next bariatric panel to be completed before her next visit  annual    - on topamax 50 mg bid, doing well  - s/p Vertical Sleeve Gastrectomy with Dr Isha Kemp on 8/13/2019  -topamax started 11/9/21-side effects discussed-topamax contract signed  Negative urine preg in office     The potential side effects of topiramate may include numbness or tingling, fatigue, upper respiratory infection symptoms, depression/anxiety, changes in taste, confusion, abdominal upset/heartburn, and trouble sleeping  Notify the provider with any changes in mood  ER with thoughts of harming yourself/others  Notify the provider with any changes in vision  Patient is aware of the teratogenicity of Topamax and condom use was recommended while on the medication  Follow up in approximately 3 months in time for annual as well       Goals:  Food log (ie ) www myfitnesspal com,sparkpeople  com,loseit com,calorieking  com,etc  baritastic-weigh and measure  No sugary beverages  At least 64oz of water daily  Increase physical activity by 10 minutes daily  Gradually increase physical activity to a goal of 5 days per week for 30 minutes of MODERATE intensity PLUS 2 days per week of FULL BODY resistance training-  4826-6217 calories per day     Diagnoses and all orders for this visit:    Obesity, Class I, BMI 30-34 9  -     Zinc; Future  -     Vitamin D 25 hydroxy; Future  -     Vitamin B12; Future  -     Vitamin B1, whole blood; Future  -     Vitamin A; Future  -     PTH, intact; Future  -     CBC and Platelet; Future  -     Comprehensive metabolic panel; Future  -     Ferritin; Future  -     Folate; Future  -     Iron Saturation %; Future    Bariatric surgery status  -     Zinc; Future  -     Vitamin D 25 hydroxy; Future  -     Vitamin B12; Future  -     Vitamin B1, whole blood;  Future  -     Vitamin A; Future  -     PTH, intact; Future  -     CBC and Platelet; Future  -     Comprehensive metabolic panel; Future  -     Ferritin; Future  -     Folate; Future  -     Iron Saturation %; Future    Postsurgical malabsorption  -     Zinc; Future  -     Vitamin D 25 hydroxy; Future  -     Vitamin B12; Future  -     Vitamin B1, whole blood; Future  -     Vitamin A; Future  -     PTH, intact; Future  -     CBC and Platelet; Future  -     Comprehensive metabolic panel; Future  -     Ferritin; Future  -     Folate; Future  -     Iron Saturation %; Future        Subjective:   Chief Complaint   Patient presents with    Follow-up     2 month post op wt Gain F/U     Patient ID: Chavo De La Torre  is a 27 y o  female with excess weight/obesity here to pursue weight management  Patient is pursuing Conservative Program    HPI  The patient presents for [facility] follow up  Food logging: keeps track of daily diet   Increased appetite/cravings: controlled on topamax   Fruit/Vegetable servings: daily   Exercise: yes  Hydration:at least 40 oz daily - advised 64 oz   Sleep: inconsistent   Colonoscopy-Not applicable    The following portions of the patient's history were reviewed and updated as appropriate: allergies, current medications, past family history, past medical history, past social history, past surgical history and problem list     Review of Systems   Constitutional: Negative  Respiratory: Negative  Cardiovascular: Negative  Gastrointestinal: Negative  Neurological: Negative  Psychiatric/Behavioral: Negative  Objective:    /66 (BP Location: Left arm, Patient Position: Sitting, Cuff Size: Adult)   Pulse 74   Temp 98 3 °F (36 8 °C) (Tympanic)   Ht 5' 3" (1 6 m)   Wt 81 6 kg (179 lb 12 8 oz)   BMI 31 85 kg/m²      Physical Exam  Vitals and nursing note reviewed  Constitutional:       Appearance: Normal appearance  She is obese  HENT:      Head: Normocephalic and atraumatic     Eyes:      Extraocular Movements: Extraocular movements intact  Pupils: Pupils are equal, round, and reactive to light  Cardiovascular:      Rate and Rhythm: Normal rate  Pulmonary:      Effort: Pulmonary effort is normal    Musculoskeletal:         General: Normal range of motion  Cervical back: Normal range of motion  Skin:     General: Skin is warm and dry  Neurological:      General: No focal deficit present  Mental Status: She is alert and oriented to person, place, and time     Psychiatric:         Mood and Affect: Mood normal

## 2022-01-13 ENCOUNTER — TELEPHONE (OUTPATIENT)
Dept: FAMILY MEDICINE CLINIC | Facility: CLINIC | Age: 27
End: 2022-01-13

## 2022-01-13 DIAGNOSIS — B34.9 VIRAL ILLNESS: Primary | ICD-10-CM

## 2022-01-13 PROCEDURE — U0005 INFEC AGEN DETEC AMPLI PROBE: HCPCS | Performed by: FAMILY MEDICINE

## 2022-01-13 PROCEDURE — U0003 INFECTIOUS AGENT DETECTION BY NUCLEIC ACID (DNA OR RNA); SEVERE ACUTE RESPIRATORY SYNDROME CORONAVIRUS 2 (SARS-COV-2) (CORONAVIRUS DISEASE [COVID-19]), AMPLIFIED PROBE TECHNIQUE, MAKING USE OF HIGH THROUGHPUT TECHNOLOGIES AS DESCRIBED BY CMS-2020-01-R: HCPCS | Performed by: FAMILY MEDICINE

## 2022-01-13 NOTE — TELEPHONE ENCOUNTER
Patient was exposed to covid on Saturday and has a dry cough and chest congestion   Patient is vaccinated and boosted and requesting a covid test

## 2022-01-15 LAB — SARS-COV-2 RNA RESP QL NAA+PROBE: POSITIVE

## 2022-01-16 NOTE — RESULT ENCOUNTER NOTE
I spoke with Sobeida and let her know that her COVID-19 swab was positive  Continue symptomatic treatment  Advised she implement home isolation measures including      Staying home  Stay in a specific "sick room" or area and away from other people or animals, including pets  Wear a mask when leaving your room  Use a separate bathroom, if available  Wipe down all commonly touched surfaces with household   Please schedule follow up video visit not needed  Pt given instructions

## 2022-01-25 NOTE — PROGRESS NOTES
Progress Note - Bariatric Surgery   Claudia Wilde 20 y o  female MRN: [id number]  Unit/Bed#: E5 -01 Encounter: [id number]      Subjective/Objective     Subjective: Patient with morbid obesity s/p GASTRECTOMY LAPAROSCOPIC SLEEVE WITH ROBOTICS (N/A) with 8/13/2019   Tolerating liquid diet without nausea or vomiting, pain adequately controlled on oral pain medication, ambulating without assistance, voiding well, using incentive spirometer  Denies fevers, chills, sweats, SOB, CP, calf pain  Objective:    /94 (BP Location: Left arm)   Pulse 77   Temp (!) 97 1 °F (36 2 °C) (Temporal)   Resp 17   Ht 5' 3" (1 6 m)   Wt 109 kg (240 lb 4 8 oz)   LMP 07/25/2019 (Exact Date)   SpO2 97%   BMI 42 57 kg/m²       Intake/Output Summary (Last 24 hours) at 8/14/2019 0834  Last data filed at 8/13/2019 1833  Gross per 24 hour   Intake 3200 ml   Output 20 ml   Net 3180 ml       Invasive Devices     Peripheral Intravenous Line            Peripheral IV 08/13/19 Right Wrist less than 1 day                ROS: 10-point system completed  All negative except see HPI  Physical Exam    General Appearance:    Alert, cooperative, no distress, appears stated age   Head:    Normocephalic, without obvious abnormality, atraumatic   Lungs:     Respirations unlabored   Heart:    Regular rate   Abdomen:     Soft, appropriate tenderness,  no masses, no organomegaly,   non distended   Extremities:   Extremities normal, atraumatic, no cyanosis or edema   Neurologic:  Incision:  Psych:   Normal strength and sensation    Clean, dry, and intact    Normal mood and affect       Lab, Imaging and other studies:  I have personally reviewed pertinent lab results    , CBC:   Lab Results   Component Value Date    WBC 14 34 (H) 08/14/2019    HGB 12 7 08/14/2019    HCT 39 7 08/14/2019    MCV 93 08/14/2019     08/14/2019    MCH 29 9 08/14/2019    MCHC 32 0 08/14/2019    RDW 12 7 08/14/2019    MPV 10 1 08/14/2019   , CMP:   Lab Spoke with pt wife see and notified her pt covid test came out positive.  She states she will call and reschedule the pulmonary function test.  Pt is asymptomatic and is fully vaccinated with booster.   Results   Component Value Date    SODIUM 137 08/14/2019    K 4 3 08/14/2019     08/14/2019    CO2 22 08/14/2019    BUN 9 08/14/2019    CREATININE 0 75 08/14/2019    CALCIUM 8 6 08/14/2019    EGFR 111 08/14/2019        VTE Mechanical Prophylaxis: sequential compression device and Lovenox    Assessment/Plan  1)  Morbid Obesity s/p GASTRECTOMY LAPAROSCOPIC SLEEVE WITH ROBOTICS (N/A) with stable post op course  Patient afebrile and hemodynamically stable  Encourage PO fluids, ambulation, and incentive spirometry  Plan of care was discussed with patient and patient's nurse  Care plan discussed with Dr Silverio Irvin: Continue bariatric clear liquid diet, ambulation, incentive spirometry and if patient continues to improve clinically will discharge to home this afternoon

## 2022-02-10 ENCOUNTER — TELEMEDICINE (OUTPATIENT)
Dept: FAMILY MEDICINE CLINIC | Facility: CLINIC | Age: 27
End: 2022-02-10
Payer: COMMERCIAL

## 2022-02-10 VITALS — HEIGHT: 63 IN | BODY MASS INDEX: 31.29 KG/M2 | WEIGHT: 176.6 LBS

## 2022-02-10 DIAGNOSIS — Z79.899 MEDICATION MANAGEMENT: ICD-10-CM

## 2022-02-10 DIAGNOSIS — J02.9 ACUTE SORE THROAT: Primary | ICD-10-CM

## 2022-02-10 DIAGNOSIS — E66.9 OBESITY, CLASS I, BMI 30-34.9: ICD-10-CM

## 2022-02-10 DIAGNOSIS — K91.2 POSTSURGICAL MALABSORPTION: Chronic | ICD-10-CM

## 2022-02-10 DIAGNOSIS — Z48.815 ENCOUNTER FOR SURGICAL AFTERCARE FOLLOWING SURGERY OF DIGESTIVE SYSTEM: Chronic | ICD-10-CM

## 2022-02-10 PROCEDURE — 99213 OFFICE O/P EST LOW 20 MIN: CPT | Performed by: NURSE PRACTITIONER

## 2022-02-10 NOTE — PROGRESS NOTES
COVID-19 Outpatient Progress Note    Assessment/Plan:    Problem List Items Addressed This Visit        Other    Acute sore throat - Primary     Onset 2/9, subjective fever overnight  Had covid 1/20/22  Today only symptoms are sore throat, post nasal drip  Unable to visualize throat during visit, per pt tonsils are red/swollen but not exudative  Neg headache, lymphadenopathy, abd pain, n/v   Reassured unlikely reinfection with covid, does not sound like strep  If sx worsen she will call to be swabbed for strep  Recommended flonase to help with postnasal drainage, tea with honey, salt water gargle, hydration  Call with concerns  Disposition:     After clarifying the patient's history, my suspicion for COVID-19 infection is very low  I have spent 20 minutes directly with the patient  Greater than 50% of this time was spent in counseling/coordination of care regarding: prognosis, risks and benefits of treatment options, instructions for management, patient and family education, importance of treatment compliance, risk factor reductions and impressions  Encounter provider Abraham Meng    Provider located at [address] [place] [address]  [phone] [facility] [id number]    Recent Visits  No visits were found meeting these conditions  Showing recent visits within past 7 days and meeting all other requirements  Today's Visits  Date Type Provider Dept   02/10/22 Telemedicine Marilyn Self, [address] [place] [address]   Showing today's visits and meeting all other requirements  Future Appointments  No visits were found meeting these conditions  Showing future appointments within next 150 days and meeting all other requirements     This virtual check-in was done via [employer] and patient was informed that this is a secure, HIPAA-compliant platform  She agrees to proceed      Patient agrees to participate in a virtual check in via telephone or video visit instead of presenting to the office to address urgent/immediate medical needs  Patient is aware this is a billable service  After connecting through [facility], the patient was identified by name and date of birth  Linda Molina was informed that this was a telemedicine visit and that the exam was being conducted confidentially over secure lines  My office door was closed  No one else was in the room  The patient was notified the following individuals were present in the room: Dr Jennifer Bacon  Linda Molina acknowledged consent and understanding of privacy and security of the telemedicine visit  I informed the patient that I have reviewed her record in Epic and presented the opportunity for her to ask any questions regarding the visit today  The patient agreed to participate  Verification of patient location:  Patient is located in the following state in which I hold an active license: [place]    Subjective: Linda Molina is a 27 y o  female who is concerned about COVID-19  Patient's symptoms include sore throat  Patient denies fever, chills, fatigue, malaise, congestion, rhinorrhea, anosmia, loss of taste, cough, shortness of breath, chest tightness, abdominal pain, nausea, vomiting, diarrhea, myalgias and headaches       - Date of symptom onset: 2/9/2022      COVID-19 vaccination status: Fully vaccinated with booster    Exposure:   Contact with a person who is under investigation (PUI) for or who is positive for COVID-19 within the last 14 days?: Yes    Hospitalized recently for fever and/or lower respiratory symptoms?: No      Currently a healthcare worker that is involved in direct patient care?: No      Works in a special setting where the risk of COVID-19 transmission may be high? (this may include long-term care, correctional and snf facilities; homeless shelters; assisted-living facilities and group homes ): No      Resident in a special setting where the risk of COVID-19 transmission may be high? (this may include long-term care, correctional and [facility] facilities; homeless shelters; assisted-living facilities and group homes ): No      Lab Results   Component Value Date    SARSCOV2 Positive (A) 01/13/2022    SARSCOV2 NOT DETECTED 07/06/2021    SARSCORONAVI Detected (A) 12/11/2020     Past Medical History:   Diagnosis Date    Abnormal Pap smear of cervix     2018/2019 - HSIL/CIN2-3    Acne     Asthma     exercise induced asthma/no recent problems    Bariatric surgery status     Chronic thoracic back pain 11/9/2017    Eczema     Exercises 1 to 2 times per week     occas 3x/week    External hemorrhoids     last assessed 8/15/12; resolved 6/13/14    Fatigue     last assessed 4/8/13; resolved 6/13/14   [facility] discharge follow-up 8/19/2019    HPV (human papilloma virus) infection     2019 (+) HRHPV type 16/18 negative    Obesity     PCOS (polycystic ovarian syndrome)     Postsurgical malabsorption      Past Surgical History:   Procedure Laterality Date    EGD      W[place] BREAST REDUCTION Bilateral 2/22/2018    Procedure: BREAST REDUCTION;  Surgeon: Kylah Grove MD;  Location: [facility];  Service: Plastics    WV LAP, DEANDRE RESTRICT PROC, LONGITUDINAL GASTRECTOMY N/A 8/13/2019    Procedure: GASTRECTOMY LAPAROSCOPIC SLEEVE WITH ROBOTICS;  Surgeon: Joseph Larios MD;  Location: [facility];  Service: Bariatrics    REDUCTION MAMMAPLASTY      WISDOM TOOTH EXTRACTION       Current Outpatient Medications   Medication Sig Dispense Refill    albuterol (Ventolin HFA) 90 mcg/act inhaler Inhale 2 puffs every 6 (six) hours as needed for wheezing 1 Inhaler 1    clindamycin-benzoyl peroxide (BENZACLIN) gel Apply to face twice daily for acne (Patient taking differently: Apply to face prn for acne) 25 g 1    EluRyng 0 12-0 015 MG/24HR vaginal ring INSERT 1 RING VAGINALLY AS DIRECTED   REMOVE AFTER 3 WEEKS & WAIT 7 DAYS BEFORE INSERTING A NEW RING 3 each 3    hydrocortisone 2 5 % ointment Apply topically 2 (two) times a day 30 g 2    loratadine (CLARITIN) 10 mg tablet Take 1 tablet (10 mg total) by mouth daily 30 tablet 5    pimecrolimus (ELIDEL) 1 % cream Apply topically 2 (two) times a day To upper eyelids 30 g 3    spironolactone (ALDACTONE) 50 mg tablet Take 3 tablets (150 mg total) by mouth daily Avoid pregnancy while on medication  90 tablet 2    topiramate (Topamax) 50 MG tablet Take 1 tablet (50 mg total) by mouth 2 (two) times a day 60 tablet 1     No current facility-administered medications for this visit  No Known Allergies    Review of Systems   Constitutional: Negative for chills, fatigue and fever  HENT: Positive for sore throat  Negative for congestion and rhinorrhea  Respiratory: Negative for cough, chest tightness and shortness of breath  Gastrointestinal: Negative for abdominal pain, diarrhea, nausea and vomiting  Musculoskeletal: Negative for myalgias  Neurological: Negative for headaches  Objective:    Vitals:    02/10/22 0905   Weight: 80 1 kg (176 lb 9 6 oz)   Height: 5' 3" (1 6 m)       Physical Exam  Constitutional:       General: She is awake  She is not in acute distress  Appearance: Normal appearance  She is well-developed and well-groomed  She is not ill-appearing  HENT:      Head: Normocephalic  Right Ear: Hearing normal       Left Ear: Hearing normal    Eyes:      General: Lids are normal       Conjunctiva/sclera: Conjunctivae normal    Pulmonary:      Effort: Pulmonary effort is normal  No tachypnea, accessory muscle usage or respiratory distress  Neurological:      Mental Status: She is alert and oriented to person, place, and time  Psychiatric:         Attention and Perception: Attention normal          Mood and Affect: Mood normal          Speech: Speech normal          Behavior: Behavior normal  Behavior is cooperative  Thought Content:  Thought content normal          Cognition and Memory: Cognition normal          Judgment: Judgment normal          VIRTUAL VISIT DISCLAIMER    Maria T Grayson verbally agrees to participate in [employer]  Pt is aware that [employer] could be limited without vital signs or the ability to perform a full hands-on physical Jordan Cuevas understands she or the provider may request at any time to terminate the video visit and request the patient to seek care or treatment in person

## 2022-02-10 NOTE — ASSESSMENT & PLAN NOTE
Onset 2/9, subjective fever overnight  Had covid 1/20/22  Today only symptoms are sore throat, post nasal drip  Unable to visualize throat during visit, per pt tonsils are red/swollen but not exudative  Neg headache, lymphadenopathy, abd pain, n/v   Reassured unlikely reinfection with covid, does not sound like strep  If sx worsen she will call to be swabbed for strep  Recommended flonase to help with postnasal drainage, tea with honey, salt water gargle, hydration  Call with concerns

## 2022-02-11 RX ORDER — TOPIRAMATE 50 MG/1
50 TABLET, FILM COATED ORAL 2 TIMES DAILY
Qty: 60 TABLET | Refills: 1 | Status: SHIPPED | OUTPATIENT
Start: 2022-02-11 | End: 2022-05-20

## 2022-04-05 ENCOUNTER — OFFICE VISIT (OUTPATIENT)
Dept: FAMILY MEDICINE CLINIC | Facility: CLINIC | Age: 27
End: 2022-04-05
Payer: COMMERCIAL

## 2022-04-05 VITALS
DIASTOLIC BLOOD PRESSURE: 90 MMHG | WEIGHT: 182.6 LBS | OXYGEN SATURATION: 100 % | TEMPERATURE: 99.2 F | SYSTOLIC BLOOD PRESSURE: 130 MMHG | HEIGHT: 63 IN | HEART RATE: 60 BPM | BODY MASS INDEX: 32.36 KG/M2 | RESPIRATION RATE: 14 BRPM

## 2022-04-05 DIAGNOSIS — J01.00 ACUTE NON-RECURRENT MAXILLARY SINUSITIS: Primary | ICD-10-CM

## 2022-04-05 PROBLEM — R30.0 DYSURIA: Status: RESOLVED | Noted: 2020-11-16 | Resolved: 2022-04-05

## 2022-04-05 PROBLEM — J02.9 ACUTE SORE THROAT: Status: RESOLVED | Noted: 2022-02-10 | Resolved: 2022-04-05

## 2022-04-05 PROCEDURE — 99213 OFFICE O/P EST LOW 20 MIN: CPT | Performed by: FAMILY MEDICINE

## 2022-04-05 RX ORDER — AMOXICILLIN AND CLAVULANATE POTASSIUM 875; 125 MG/1; MG/1
1 TABLET, FILM COATED ORAL EVERY 12 HOURS SCHEDULED
Qty: 20 TABLET | Refills: 0 | Status: SHIPPED | OUTPATIENT
Start: 2022-04-05 | End: 2022-04-15

## 2022-04-05 NOTE — PROGRESS NOTES
Assessment/Plan:    1  Acute non-recurrent maxillary sinusitis  Assessment & Plan:  Start augmentin  Take probiotic    Orders:  -     amoxicillin-clavulanate (AUGMENTIN) 875-125 mg per tablet; Take 1 tablet by mouth every 12 (twelve) hours for 10 days      Depression Screening and Follow-up Plan: Patient was screened for depression during today's encounter  They screened negative with a PHQ-2 score of 0  There are no Patient Instructions on file for this visit  No follow-ups on file  Subjective:      Patient ID: Michael Muhammad is a 27 y o  female  Chief Complaint   Patient presents with    Sinusitis     Patient here with sinus pressure head congestion post nasl drip        Started with sinus pressure  Stopped wearing mask  Sinus pressure  Home covid negative  Taking nyquil, tylenol severe cold and flu      Sinusitis  This is a new problem  The current episode started in the past 7 days  There has been no fever  Associated symptoms include congestion, coughing, sinus pressure and a sore throat  Treatments tried: nyquil  The treatment provided mild relief  The following portions of the patient's history were reviewed and updated as appropriate:  past social history    Review of Systems   Constitutional: Negative  HENT: Positive for congestion, sinus pressure and sore throat  Eyes: Negative  Respiratory: Positive for cough  Cardiovascular: Negative  Gastrointestinal: Negative  Endocrine: Negative  Genitourinary: Negative  Musculoskeletal: Negative  Skin: Negative  Allergic/Immunologic: Negative  Neurological: Negative  Hematological: Negative  Psychiatric/Behavioral: Negative            Current Outpatient Medications   Medication Sig Dispense Refill    albuterol (Ventolin HFA) 90 mcg/act inhaler Inhale 2 puffs every 6 (six) hours as needed for wheezing 1 Inhaler 1    clindamycin-benzoyl peroxide (BENZACLIN) gel Apply to face twice daily for acne (Patient taking differently: Apply to face prn for acne) 25 g 1    EluRyng 0 12-0 015 MG/24HR vaginal ring INSERT 1 RING VAGINALLY AS DIRECTED  REMOVE AFTER 3 WEEKS & WAIT 7 DAYS BEFORE INSERTING A NEW RING 3 each 3    hydrocortisone 2 5 % ointment Apply topically 2 (two) times a day 30 g 2    loratadine (CLARITIN) 10 mg tablet Take 1 tablet (10 mg total) by mouth daily 30 tablet 5    pimecrolimus (ELIDEL) 1 % cream Apply topically 2 (two) times a day To upper eyelids 30 g 3    spironolactone (ALDACTONE) 50 mg tablet Take 3 tablets (150 mg total) by mouth daily Avoid pregnancy while on medication  90 tablet 2    topiramate (Topamax) 50 MG tablet Take 1 tablet (50 mg total) by mouth 2 (two) times a day 60 tablet 1    amoxicillin-clavulanate (AUGMENTIN) 875-125 mg per tablet Take 1 tablet by mouth every 12 (twelve) hours for 10 days 20 tablet 0     No current facility-administered medications for this visit  Objective:    /90 (BP Location: Left arm, Patient Position: Sitting, Cuff Size: Standard)   Pulse 60   Temp 99 2 °F (37 3 °C)   Resp 14   Ht 5' 3" (1 6 m)   Wt 82 8 kg (182 lb 9 6 oz)   SpO2 100%   BMI 32 35 kg/m²      Physical Exam  Vitals and nursing note reviewed  Constitutional:       Appearance: Normal appearance  HENT:      Head: Normocephalic and atraumatic  Right Ear: Tympanic membrane, ear canal and external ear normal       Left Ear: Tympanic membrane, ear canal and external ear normal    Eyes:      Extraocular Movements: Extraocular movements intact  Pupils: Pupils are equal, round, and reactive to light  Cardiovascular:      Rate and Rhythm: Normal rate and regular rhythm  Pulses: Normal pulses  Heart sounds: Normal heart sounds  Pulmonary:      Effort: Pulmonary effort is normal       Breath sounds: Normal breath sounds  Abdominal:      General: Abdomen is flat  Musculoskeletal:      Cervical back: Normal range of motion and neck supple  Skin:     General: Skin is warm  Capillary Refill: Capillary refill takes less than 2 seconds  Neurological:      General: No focal deficit present  Mental Status: She is alert and oriented to person, place, and time  Psychiatric:         Mood and Affect: Mood normal          Behavior: Behavior normal          Thought Content:  Thought content normal          Judgment: Judgment normal              Jose May, DO

## 2022-05-12 ENCOUNTER — OFFICE VISIT (OUTPATIENT)
Dept: FAMILY MEDICINE CLINIC | Facility: CLINIC | Age: 27
End: 2022-05-12
Payer: COMMERCIAL

## 2022-05-12 VITALS
TEMPERATURE: 98.6 F | BODY MASS INDEX: 32.14 KG/M2 | HEART RATE: 67 BPM | DIASTOLIC BLOOD PRESSURE: 70 MMHG | RESPIRATION RATE: 14 BRPM | OXYGEN SATURATION: 100 % | HEIGHT: 63 IN | WEIGHT: 181.4 LBS | SYSTOLIC BLOOD PRESSURE: 104 MMHG

## 2022-05-12 DIAGNOSIS — R51.9 ACUTE NONINTRACTABLE HEADACHE, UNSPECIFIED HEADACHE TYPE: Primary | ICD-10-CM

## 2022-05-12 PROCEDURE — 99213 OFFICE O/P EST LOW 20 MIN: CPT | Performed by: FAMILY MEDICINE

## 2022-05-12 NOTE — PROGRESS NOTES
Assessment/Plan:    1  Acute nonintractable headache, unspecified headache type  Assessment & Plan:  Due to trauma to head  Window fell onto head  Will monitor over night and message me             There are no Patient Instructions on file for this visit  Return if symptoms worsen or fail to improve  Subjective:      Patient ID: Annette Hill is a 27 y o  female  Chief Complaint   Patient presents with    Headache     Patient here with headache dizziness no nausea window fell on head last night        Here due to window fell onto her head last night  Was trying to close it  No nausea  Headache last night  Was afraid to sleep last night  Woke up without headache and then came on  Tired  Dizziness has gone away      Headache  Headache pattern:  Some headache always there, and the pain level varies  Date current headache began:  5/11/2022  Providers seen:  None  Do headaches wake patient from sleep?: No    Pain severity:  4  Duration greater than three days: not yet  Aggravating factors:  Less sleep      The following portions of the patient's history were reviewed and updated as appropriate:  past social history    Review of Systems   Constitutional: Positive for fatigue  Eyes: Negative  Respiratory: Negative  Cardiovascular: Negative  Gastrointestinal: Negative  Endocrine: Negative  Genitourinary: Negative  Musculoskeletal: Negative  Allergic/Immunologic: Negative  Neurological: Positive for headaches  Hematological: Negative  Psychiatric/Behavioral: Negative            Current Outpatient Medications   Medication Sig Dispense Refill    albuterol (Ventolin HFA) 90 mcg/act inhaler Inhale 2 puffs every 6 (six) hours as needed for wheezing 1 Inhaler 1    clindamycin-benzoyl peroxide (BENZACLIN) gel Apply to face twice daily for acne (Patient taking differently: Apply to face prn for acne) 25 g 1    EluRyng 0 12-0 015 MG/24HR vaginal ring INSERT 1 RING VAGINALLY AS DIRECTED  REMOVE AFTER 3 WEEKS & WAIT 7 DAYS BEFORE INSERTING A NEW RING 3 each 3    hydrocortisone 2 5 % ointment Apply topically 2 (two) times a day 30 g 2    loratadine (CLARITIN) 10 mg tablet Take 1 tablet (10 mg total) by mouth daily (Patient taking differently: Take 10 mg by mouth if needed) 30 tablet 5    pimecrolimus (ELIDEL) 1 % cream Apply topically 2 (two) times a day To upper eyelids 30 g 3    spironolactone (ALDACTONE) 50 mg tablet Take 3 tablets (150 mg total) by mouth daily Avoid pregnancy while on medication  90 tablet 2    topiramate (Topamax) 50 MG tablet Take 1 tablet (50 mg total) by mouth 2 (two) times a day 60 tablet 1     No current facility-administered medications for this visit  Objective:    /70 (BP Location: Left arm, Patient Position: Sitting, Cuff Size: Standard)   Pulse 67   Temp 98 6 °F (37 °C)   Resp 14   Ht 5' 3" (1 6 m)   Wt 82 3 kg (181 lb 6 4 oz)   SpO2 100%   BMI 32 13 kg/m²      Physical Exam  Vitals and nursing note reviewed  Constitutional:       Appearance: Normal appearance  HENT:      Head: Normocephalic and atraumatic  Eyes:      Extraocular Movements: Extraocular movements intact  Pupils: Pupils are equal, round, and reactive to light  Musculoskeletal:      Cervical back: Normal range of motion and neck supple  Neurological:      General: No focal deficit present  Mental Status: She is alert and oriented to person, place, and time  Psychiatric:         Mood and Affect: Mood normal          Behavior: Behavior normal          Thought Content:  Thought content normal          Judgment: Judgment normal              Rebekah Dodson DO

## 2022-05-20 ENCOUNTER — OFFICE VISIT (OUTPATIENT)
Dept: BARIATRICS | Facility: CLINIC | Age: 27
End: 2022-05-20
Payer: COMMERCIAL

## 2022-05-20 VITALS
SYSTOLIC BLOOD PRESSURE: 110 MMHG | DIASTOLIC BLOOD PRESSURE: 70 MMHG | TEMPERATURE: 98 F | WEIGHT: 184.5 LBS | BODY MASS INDEX: 32.69 KG/M2 | HEART RATE: 61 BPM | HEIGHT: 63 IN

## 2022-05-20 DIAGNOSIS — E66.9 OBESITY, CLASS I, BMI 30-34.9: ICD-10-CM

## 2022-05-20 DIAGNOSIS — K91.2 POSTSURGICAL MALABSORPTION: Chronic | ICD-10-CM

## 2022-05-20 DIAGNOSIS — Z48.815 ENCOUNTER FOR SURGICAL AFTERCARE FOLLOWING SURGERY OF DIGESTIVE SYSTEM: Primary | ICD-10-CM

## 2022-05-20 DIAGNOSIS — Z79.899 MEDICATION MANAGEMENT: ICD-10-CM

## 2022-05-20 DIAGNOSIS — Z48.815 ENCOUNTER FOR SURGICAL AFTERCARE FOLLOWING SURGERY OF DIGESTIVE SYSTEM: Chronic | ICD-10-CM

## 2022-05-20 DIAGNOSIS — K91.2 POSTSURGICAL MALABSORPTION: ICD-10-CM

## 2022-05-20 DIAGNOSIS — Z98.84 BARIATRIC SURGERY STATUS: ICD-10-CM

## 2022-05-20 PROCEDURE — 99213 OFFICE O/P EST LOW 20 MIN: CPT | Performed by: PHYSICIAN ASSISTANT

## 2022-05-20 RX ORDER — TOPIRAMATE 50 MG/1
TABLET, FILM COATED ORAL
Qty: 60 TABLET | Refills: 1 | Status: SHIPPED | OUTPATIENT
Start: 2022-05-20 | End: 2022-05-20

## 2022-05-20 RX ORDER — TOPIRAMATE 50 MG/1
50 TABLET, FILM COATED ORAL 2 TIMES DAILY
Qty: 60 TABLET | Refills: 0 | Status: SHIPPED | OUTPATIENT
Start: 2022-05-20 | End: 2022-06-21

## 2022-05-20 NOTE — PATIENT INSTRUCTIONS
Follow-up after EGD  We kindly ask that your arrive 15 minutes before your scheduled appointment time with your provider to allow our staff to room you, get your vital signs and update your chart  Get lab work done   Please call the office if you need a script  It is recommended to check with your insurance BEFORE getting labs done to make sure they are covered by your policy  Call our office if you have any problems with abdominal pain especially associated with fever, chills, nausea, vomiting or any other concerns  All  Post-bariatric surgery patients should be aware that very small quantities of any alcohol can cause impairment and it is very possible not to feel the effect  The effect can be in the system for several hours  It is also a stomach irritant  It is advised to AVOID alcohol, Nonsteroidal antiinflammatory drugs (NSAIDS) and nicotine of all forms   Any of these can cause stomach irritation/pain  Discussed the effects of alcohol on a bariatric patient and the increased impairment risk  Keep up the good work! The potential side effects of topiramate may include numbness or tingling, fatigue, upper respiratory infection symptoms, depression/anxiety, changes in taste, confusion, abdominal upset/heartburn, and trouble sleeping  Notify the provider with any changes in mood  ER with thoughts of harming yourself/others  Notify the provider with any changes in vision  DO NOT STOP THIS MEDICATION SUDDENLY

## 2022-05-21 ENCOUNTER — APPOINTMENT (OUTPATIENT)
Dept: LAB | Facility: CLINIC | Age: 27
End: 2022-05-21
Payer: COMMERCIAL

## 2022-05-21 DIAGNOSIS — K91.2 POSTSURGICAL MALABSORPTION: ICD-10-CM

## 2022-05-21 DIAGNOSIS — Z98.84 BARIATRIC SURGERY STATUS: ICD-10-CM

## 2022-05-21 DIAGNOSIS — E66.9 OBESITY, CLASS I, BMI 30-34.9: ICD-10-CM

## 2022-05-21 LAB
25(OH)D3 SERPL-MCNC: 34.8 NG/ML (ref 30–100)
ALBUMIN SERPL BCP-MCNC: 3.8 G/DL (ref 3.5–5)
ALP SERPL-CCNC: 55 U/L (ref 34–104)
ALT SERPL W P-5'-P-CCNC: 12 U/L (ref 7–52)
ANION GAP SERPL CALCULATED.3IONS-SCNC: 6 MMOL/L (ref 4–13)
AST SERPL W P-5'-P-CCNC: 14 U/L (ref 13–39)
BILIRUB SERPL-MCNC: 0.45 MG/DL (ref 0.2–1)
BUN SERPL-MCNC: 16 MG/DL (ref 5–25)
CALCIUM SERPL-MCNC: 8.7 MG/DL (ref 8.4–10.2)
CHLORIDE SERPL-SCNC: 109 MMOL/L (ref 96–108)
CO2 SERPL-SCNC: 26 MMOL/L (ref 21–32)
CREAT SERPL-MCNC: 0.73 MG/DL (ref 0.6–1.3)
ERYTHROCYTE [DISTWIDTH] IN BLOOD BY AUTOMATED COUNT: 13 % (ref 11.6–15.1)
FERRITIN SERPL-MCNC: 35 NG/ML (ref 8–388)
FOLATE SERPL-MCNC: >20 NG/ML (ref 3.1–17.5)
GFR SERPL CREATININE-BSD FRML MDRD: 112 ML/MIN/1.73SQ M
GLUCOSE P FAST SERPL-MCNC: 74 MG/DL (ref 65–99)
HCT VFR BLD AUTO: 39.9 % (ref 34.8–46.1)
HGB BLD-MCNC: 13.1 G/DL (ref 11.5–15.4)
IRON SATN MFR SERPL: 18 % (ref 15–50)
IRON SERPL-MCNC: 77 UG/DL (ref 50–170)
MCH RBC QN AUTO: 30.2 PG (ref 26.8–34.3)
MCHC RBC AUTO-ENTMCNC: 32.8 G/DL (ref 31.4–37.4)
MCV RBC AUTO: 92 FL (ref 82–98)
PLATELET # BLD AUTO: 246 THOUSANDS/UL (ref 149–390)
PMV BLD AUTO: 10.7 FL (ref 8.9–12.7)
POTASSIUM SERPL-SCNC: 4 MMOL/L (ref 3.5–5.3)
PROT SERPL-MCNC: 6.7 G/DL (ref 6.4–8.4)
PTH-INTACT SERPL-MCNC: 53.4 PG/ML (ref 18.4–80.1)
RBC # BLD AUTO: 4.34 MILLION/UL (ref 3.81–5.12)
SODIUM SERPL-SCNC: 141 MMOL/L (ref 135–147)
TIBC SERPL-MCNC: 417 UG/DL (ref 250–450)
VIT B12 SERPL-MCNC: 528 PG/ML (ref 100–900)
WBC # BLD AUTO: 6.64 THOUSAND/UL (ref 4.31–10.16)

## 2022-05-21 PROCEDURE — 83540 ASSAY OF IRON: CPT

## 2022-05-21 PROCEDURE — 82306 VITAMIN D 25 HYDROXY: CPT

## 2022-05-21 PROCEDURE — 83970 ASSAY OF PARATHORMONE: CPT

## 2022-05-21 PROCEDURE — 80053 COMPREHEN METABOLIC PANEL: CPT

## 2022-05-21 PROCEDURE — 83550 IRON BINDING TEST: CPT

## 2022-05-21 PROCEDURE — 84630 ASSAY OF ZINC: CPT

## 2022-05-21 PROCEDURE — 36415 COLL VENOUS BLD VENIPUNCTURE: CPT

## 2022-05-21 PROCEDURE — 82746 ASSAY OF FOLIC ACID SERUM: CPT

## 2022-05-21 PROCEDURE — 85027 COMPLETE CBC AUTOMATED: CPT

## 2022-05-21 PROCEDURE — 82728 ASSAY OF FERRITIN: CPT

## 2022-05-21 PROCEDURE — 82607 VITAMIN B-12: CPT

## 2022-05-21 PROCEDURE — 84425 ASSAY OF VITAMIN B-1: CPT

## 2022-05-21 PROCEDURE — 84590 ASSAY OF VITAMIN A: CPT

## 2022-05-23 ENCOUNTER — OFFICE VISIT (OUTPATIENT)
Dept: BARIATRICS | Facility: CLINIC | Age: 27
End: 2022-05-23
Payer: COMMERCIAL

## 2022-05-23 VITALS
HEIGHT: 63 IN | OXYGEN SATURATION: 99 % | SYSTOLIC BLOOD PRESSURE: 108 MMHG | TEMPERATURE: 98.7 F | WEIGHT: 184.4 LBS | BODY MASS INDEX: 32.67 KG/M2 | HEART RATE: 75 BPM | DIASTOLIC BLOOD PRESSURE: 70 MMHG

## 2022-05-23 DIAGNOSIS — E28.2 PCOS (POLYCYSTIC OVARIAN SYNDROME): ICD-10-CM

## 2022-05-23 DIAGNOSIS — K91.2 POSTSURGICAL MALABSORPTION: Primary | ICD-10-CM

## 2022-05-23 DIAGNOSIS — E66.9 OBESITY, CLASS I, BMI 30-34.9: ICD-10-CM

## 2022-05-23 PROCEDURE — 99214 OFFICE O/P EST MOD 30 MIN: CPT | Performed by: PHYSICIAN ASSISTANT

## 2022-05-23 NOTE — ASSESSMENT & PLAN NOTE
On eluryng  Was on metformin prior and does not feel it benefit weight   Last insulin level was normal

## 2022-05-23 NOTE — ASSESSMENT & PLAN NOTE
-Patient is pursuing Conservative Program  -Initial weight loss goal of 5-10% weight loss for improved health  -Screening labs 5/21/22    Initial:186  Current:184 4:  Change:-1 6  Goal:      Food log (ie ) www myfitnesspal com,sparkpeople  com,loseit com,calorieking  com,etc  baritastic-weigh and measure  No sugary beverages  At least 64oz of water daily  Increase physical activity by 10 minutes daily  Gradually increase physical activity to a goal of 5 days per week for 30 minutes of MODERATE intensity PLUS 2 days per week of FULL BODY resistance training-  0093-4924 calories per day      Medications  -to continue topamax ( started 11/9/21)-side effects discussed-topamax contract signed  Negative urine preg in office Patient is aware of the teratogenicity of Topamax and condom use was recommended while on the medication     -to get ekg and discussed starting phentermine 15mg  Phentermine has as potential side effects of increased heart rate, increased blood pressure, palpitations, headache, dizziness, insomnia, altered mood, abdominal upset, and dry mouth  Notify the provider with change in mood  ER with SI/HI  Phentermine should be stopped prior to surgery   Notify the provider with any changes in vision

## 2022-05-23 NOTE — PROGRESS NOTES
Assessment/Plan:    Obesity, Class I, BMI 30-34 9  -Patient is pursuing Conservative Program  -Initial weight loss goal of 5-10% weight loss for improved health  -Screening labs 5/21/22    Initial:186  Current:184 4:  Change:-1 6  Goal:      Food log (ie ) www myfitnesspal com,sparkpeople  com,loseit com,calorieking  com,etc  baritastic-weigh and measure  No sugary beverages  At least 64oz of water daily  Increase physical activity by 10 minutes daily  Gradually increase physical activity to a goal of 5 days per week for 30 minutes of MODERATE intensity PLUS 2 days per week of FULL BODY resistance training-  4979-7824 calories per day      Medications  -to continue topamax ( started 11/9/21)-side effects discussed-topamax contract signed  Negative urine preg in office Patient is aware of the teratogenicity of Topamax and condom use was recommended while on the medication     -to get ekg and discussed starting phentermine 15mg  Phentermine has as potential side effects of increased heart rate, increased blood pressure, palpitations, headache, dizziness, insomnia, altered mood, abdominal upset, and dry mouth  Notify the provider with change in mood  ER with SI/HI  Phentermine should be stopped prior to surgery  Notify the provider with any changes in vision      PCOS (polycystic ovarian syndrome)  On eluryng  Was on metformin prior and does not feel it benefit weight  Last insulin level was normal            Follow up in approximately 2 months with Non-Surgical Physician/Advanced Practitioner  Diagnoses and all orders for this visit:    Postsurgical malabsorption  -     ECG 12 lead; Future    PCOS (polycystic ovarian syndrome)  -     ECG 12 lead; Future    Obesity, Class I, BMI 30-34 9  -     ECG 12 lead;  Future          Subjective:   Chief Complaint   Patient presents with    Follow-up     MWM         Patient ID: Gerlean Ormond  is a 27 y o  female with excess weight/obesity here to pursue weight managment  Patient is pursuing Conservative Program      HPI s/p Vertical Dailucio Jc 8/13/2019  She is taking topamax 50mg Bid  She has been stressed recently and had the flu/covid  She has had slight improvements in cravings but not change in appetite  Wt Readings from Last 10 Encounters:   05/23/22 83 6 kg (184 lb 6 4 oz)   05/20/22 83 7 kg (184 lb 8 oz)   05/12/22 82 3 kg (181 lb 6 4 oz)   04/05/22 82 8 kg (182 lb 9 6 oz)   02/10/22 80 1 kg (176 lb 9 6 oz)   01/07/22 81 6 kg (179 lb 12 8 oz)   11/11/21 83 4 kg (183 lb 12 8 oz)   11/09/21 84 4 kg (186 lb)   10/04/21 83 2 kg (183 lb 6 4 oz)   09/09/21 83 6 kg (184 lb 5 oz)         Food logging:no eats similar foods often  Increased appetite/cravings:no   Fruit/Vegetable servings:4 or more  Exercise:walking will do 4 miles   Vigme 2-3 times a week  Hydration:50 oz water    Diet Recall:  B: skips usually but has been trying protein shake or 1 hard boiled egg  L:sandwich or leftover  [place] and brown rice or quinoa  Sometimes veggies  S: fig or granola bar  D:Protein and veggie for dinner  S:granola bar or goldfish    Colonoscopy-Not applicable    The following portions of the patient's history were reviewed and updated as appropriate:   She  has a past medical history of Abnormal Pap smear of cervix, Acne, Asthma, Bariatric surgery status, Chronic thoracic back pain (11/9/2017), Eczema, Exercises 1 to 2 times per week, External hemorrhoids, Fatigue, Hospital discharge follow-up (8/19/2019), HPV (human papilloma virus) infection, Obesity, PCOS (polycystic ovarian syndrome), and Postsurgical malabsorption    She   Patient Active Problem List    Diagnosis Date Noted    Acute nonintractable headache 05/12/2022    Acute non-recurrent maxillary sinusitis 04/05/2022    History of COVID-19 01/06/2021    Menorrhagia with regular cycle 09/29/2020    Obesity, Class I, BMI 30-34 9 09/16/2020    Extrinsic asthma without complication 62/63/9602  Encounter for surgical aftercare following surgery of digestive system 03/16/2020    Low vitamin B12 level 03/16/2020    High blood copper level 03/16/2020    BMI 27 0-27 9,adult 03/16/2020    Postsurgical malabsorption 11/25/2019    Need for vaccination 10/22/2019    Encounter for gynecological examination (general) (routine) without abnormal findings 09/17/2019    Bariatric surgery status 09/10/2019    PCOS (polycystic ovarian syndrome) 07/17/2019    Annual physical exam 04/25/2019    Moderate dysplasia of cervix (SALTY II) 01/31/2019    Hyperinsulinemia 12/19/2018    Acne vulgaris 04/17/2018    Hemorrhoids, external 04/21/2017     She  has a past surgical history that includes pr breast reduction (Bilateral, 2/22/2018); Clare tooth extraction; Reduction mammaplasty; EGD; and pr lap, kyler restrict proc, longitudinal gastrectomy (N/A, 8/13/2019)  Her family history includes Breast cancer in her maternal aunt; Cancer in her maternal aunt; Clotting disorder in her mother; Diabetes in her father  She  reports that she has never smoked  She has never used smokeless tobacco  She reports current alcohol use  She reports that she does not use drugs  Current Outpatient Medications   Medication Sig Dispense Refill    albuterol (Ventolin HFA) 90 mcg/act inhaler Inhale 2 puffs every 6 (six) hours as needed for wheezing 1 Inhaler 1    clindamycin-benzoyl peroxide (BENZACLIN) gel Apply to face twice daily for acne (Patient taking differently: Apply to face prn for acne) 25 g 1    EluRyng 0 12-0 015 MG/24HR vaginal ring INSERT 1 RING VAGINALLY AS DIRECTED   REMOVE AFTER 3 WEEKS & WAIT 7 DAYS BEFORE INSERTING A NEW RING 3 each 3    hydrocortisone 2 5 % ointment Apply topically 2 (two) times a day 30 g 2    loratadine (CLARITIN) 10 mg tablet Take 1 tablet (10 mg total) by mouth daily (Patient taking differently: Take 10 mg by mouth if needed) 30 tablet 5    pimecrolimus (ELIDEL) 1 % cream Apply topically 2 (two) times a day To upper eyelids 30 g 3    spironolactone (ALDACTONE) 50 mg tablet Take 3 tablets (150 mg total) by mouth daily Avoid pregnancy while on medication  90 tablet 2    topiramate (TOPAMAX) 50 MG tablet Take 1 tablet (50 mg total) by mouth in the morning and 1 tablet (50 mg total) in the evening  60 tablet 0     No current facility-administered medications for this visit  Current Outpatient Medications on File Prior to Visit   Medication Sig    albuterol (Ventolin HFA) 90 mcg/act inhaler Inhale 2 puffs every 6 (six) hours as needed for wheezing    clindamycin-benzoyl peroxide (BENZACLIN) gel Apply to face twice daily for acne (Patient taking differently: Apply to face prn for acne)    EluRyng 0 12-0 015 MG/24HR vaginal ring INSERT 1 RING VAGINALLY AS DIRECTED  REMOVE AFTER 3 WEEKS & WAIT 7 DAYS BEFORE INSERTING A NEW RING    hydrocortisone 2 5 % ointment Apply topically 2 (two) times a day    loratadine (CLARITIN) 10 mg tablet Take 1 tablet (10 mg total) by mouth daily (Patient taking differently: Take 10 mg by mouth if needed)    pimecrolimus (ELIDEL) 1 % cream Apply topically 2 (two) times a day To upper eyelids    spironolactone (ALDACTONE) 50 mg tablet Take 3 tablets (150 mg total) by mouth daily Avoid pregnancy while on medication   topiramate (TOPAMAX) 50 MG tablet Take 1 tablet (50 mg total) by mouth in the morning and 1 tablet (50 mg total) in the evening  No current facility-administered medications on file prior to visit  She has No Known Allergies       Review of Systems   Constitutional: Negative for chills and fever  Eyes: Negative for redness  Respiratory: Negative for shortness of breath  Cardiovascular: Negative for chest pain and palpitations  Gastrointestinal: Negative for abdominal pain and vomiting  Genitourinary: Negative for dysuria and hematuria  Musculoskeletal: Negative for arthralgias and back pain  Skin: Negative for rash     Neurological: Negative for seizures and syncope  Psychiatric/Behavioral: Negative for dysphoric mood  The patient is not nervous/anxious  All other systems reviewed and are negative  Objective:    /70   Pulse 75   Temp 98 7 °F (37 1 °C) (Tympanic)   Ht 5' 3" (1 6 m)   Wt 83 6 kg (184 lb 6 4 oz)   SpO2 99%   BMI 32 66 kg/m²      Physical Exam  Vitals and nursing note reviewed  Constitutional:       General: She is not in acute distress  Appearance: She is well-developed  She is obese  HENT:      Head: Normocephalic and atraumatic  Eyes:      Conjunctiva/sclera: Conjunctivae normal    Pulmonary:      Effort: Pulmonary effort is normal  No respiratory distress  Skin:     Findings: No rash (visible)  Neurological:      Mental Status: She is alert and oriented to person, place, and time     Psychiatric:         Behavior: Behavior normal

## 2022-05-24 ENCOUNTER — OFFICE VISIT (OUTPATIENT)
Dept: LAB | Facility: HOSPITAL | Age: 27
End: 2022-05-24
Payer: COMMERCIAL

## 2022-05-24 DIAGNOSIS — E28.2 PCOS (POLYCYSTIC OVARIAN SYNDROME): ICD-10-CM

## 2022-05-24 DIAGNOSIS — E66.9 OBESITY, CLASS I, BMI 30-34.9: ICD-10-CM

## 2022-05-24 DIAGNOSIS — K91.2 POSTSURGICAL MALABSORPTION: ICD-10-CM

## 2022-05-24 DIAGNOSIS — J30.1 SEASONAL ALLERGIC RHINITIS DUE TO POLLEN: ICD-10-CM

## 2022-05-24 LAB — ZINC SERPL-MCNC: 77 UG/DL (ref 44–115)

## 2022-05-24 PROCEDURE — 93005 ELECTROCARDIOGRAM TRACING: CPT

## 2022-05-24 RX ORDER — LORATADINE 10 MG/1
10 TABLET ORAL DAILY
Qty: 30 TABLET | Refills: 3 | Status: SHIPPED | OUTPATIENT
Start: 2022-05-24

## 2022-05-25 LAB
ATRIAL RATE: 64 BPM
P AXIS: 41 DEGREES
PR INTERVAL: 150 MS
QRS AXIS: 3 DEGREES
QRSD INTERVAL: 78 MS
QT INTERVAL: 416 MS
QTC INTERVAL: 429 MS
T WAVE AXIS: 33 DEGREES
VENTRICULAR RATE: 64 BPM

## 2022-05-25 PROCEDURE — 93010 ELECTROCARDIOGRAM REPORT: CPT | Performed by: INTERNAL MEDICINE

## 2022-05-26 DIAGNOSIS — E16.1 HYPERINSULINEMIA: Primary | ICD-10-CM

## 2022-05-26 DIAGNOSIS — E66.9 OBESITY, CLASS I, BMI 30-34.9: ICD-10-CM

## 2022-05-26 DIAGNOSIS — E28.2 PCOS (POLYCYSTIC OVARIAN SYNDROME): ICD-10-CM

## 2022-05-26 RX ORDER — PHENTERMINE HYDROCHLORIDE 15 MG/1
15 CAPSULE ORAL EVERY MORNING
Qty: 30 CAPSULE | Refills: 1 | Status: SHIPPED | OUTPATIENT
Start: 2022-05-26 | End: 2022-05-26 | Stop reason: SDUPTHER

## 2022-05-31 ENCOUNTER — PREP FOR PROCEDURE (OUTPATIENT)
Dept: BARIATRICS | Facility: CLINIC | Age: 27
End: 2022-05-31

## 2022-05-31 DIAGNOSIS — Z98.84 BARIATRIC SURGERY STATUS: Primary | ICD-10-CM

## 2022-05-31 LAB — VIT A SERPL-MCNC: 51.2 UG/DL (ref 18.9–57.3)

## 2022-06-04 LAB — VIT B1 BLD-SCNC: 132 NMOL/L (ref 66.5–200)

## 2022-06-28 ENCOUNTER — OFFICE VISIT (OUTPATIENT)
Dept: GYNECOLOGIC ONCOLOGY | Facility: CLINIC | Age: 27
End: 2022-06-28
Payer: COMMERCIAL

## 2022-06-28 VITALS
HEIGHT: 63 IN | OXYGEN SATURATION: 99 % | DIASTOLIC BLOOD PRESSURE: 80 MMHG | TEMPERATURE: 96.8 F | BODY MASS INDEX: 32.78 KG/M2 | WEIGHT: 185 LBS | SYSTOLIC BLOOD PRESSURE: 128 MMHG | HEART RATE: 81 BPM | RESPIRATION RATE: 16 BRPM

## 2022-06-28 DIAGNOSIS — N87.1 MODERATE DYSPLASIA OF CERVIX (CIN II): Primary | ICD-10-CM

## 2022-06-28 PROCEDURE — 87624 HPV HI-RISK TYP POOLED RSLT: CPT | Performed by: PHYSICIAN ASSISTANT

## 2022-06-28 PROCEDURE — 88175 CYTOPATH C/V AUTO FLUID REDO: CPT | Performed by: PHYSICIAN ASSISTANT

## 2022-06-28 PROCEDURE — 99212 OFFICE O/P EST SF 10 MIN: CPT | Performed by: PHYSICIAN ASSISTANT

## 2022-06-28 NOTE — PROGRESS NOTES
Assessment/Plan:    Problem List Items Addressed This Visit        Genitourinary    Moderate dysplasia of cervix (SALTY II) - Primary     27year-old with history of CIN2 diagnosed in 2009  Negative PAP performed in June 2021  Repeat 1 year follow-up PAP performed today  Clinical exam benign  I will call the patient with the results of her PAP smear and plan follow-up accordingly  Relevant Orders    Liquid-based pap, diagnostic            CHIEF COMPLAINT:   1 year f/u PAP smear    Problem:  Biopsy-proven CIN2, diagnosed 2019    Previous therapy:  Colposcopies    Patient ID: Isabelle Avitia is a 27 y o  female  who presents to the office for 1 year follow-up PAP smear  Her PAP performed in June 2021 was WNL  She is without complaints today  She notes regular menstrual cycles and denies post-coital bleeding  The patient denies significant changes in her past medical history  The following portions of the patient's history were reviewed and updated as appropriate: allergies, current medications, past medical history, past surgical history and problem list     Review of Systems   Constitutional: Negative  Respiratory: Negative  Gastrointestinal: Negative  Genitourinary: Negative  Skin: Negative  Current Outpatient Medications   Medication Sig Dispense Refill    albuterol (Ventolin HFA) 90 mcg/act inhaler Inhale 2 puffs every 6 (six) hours as needed for wheezing 1 Inhaler 1    clindamycin-benzoyl peroxide (BENZACLIN) gel Apply to face twice daily for acne (Patient taking differently: Apply to face prn for acne) 25 g 1    EluRyng 0 12-0 015 MG/24HR vaginal ring INSERT 1 RING VAGINALLY AS DIRECTED  REMOVE AFTER 3 WEEKS & WAIT 7 DAYS BEFORE INSERTING A NEW RING 3 each 3    hydrocortisone 2 5 % ointment Apply topically 2 (two) times a day 30 g 2    loratadine (CLARITIN) 10 mg tablet Take 1 tablet (10 mg total) by mouth in the morning   30 tablet 3    phentermine 15 MG capsule Take 1 capsule (15 mg total) by mouth every morning 30 capsule 1    pimecrolimus (ELIDEL) 1 % cream Apply topically 2 (two) times a day To upper eyelids 30 g 3    spironolactone (ALDACTONE) 50 mg tablet Take 3 tablets (150 mg total) by mouth daily Avoid pregnancy while on medication  90 tablet 2    topiramate (TOPAMAX) 50 MG tablet TAKE 1 TABLET (50 MG TOTAL) BY MOUTH IN THE MORNING AND 1 TABLET (50 MG TOTAL) IN THE EVENING  60 tablet 1     No current facility-administered medications for this visit  Objective:    Blood pressure 128/80, pulse 81, temperature (!) 96 8 °F (36 °C), temperature source Temporal, resp  rate 16, height 5' 3" (1 6 m), weight 83 9 kg (185 lb), last menstrual period 06/15/2022, SpO2 99 %  Body mass index is 32 77 kg/m²  Body surface area is 1 87 meters squared  Physical Exam  Constitutional:       Appearance: Normal appearance  HENT:      Head: Normocephalic and atraumatic  Pulmonary:      Effort: Pulmonary effort is normal    Abdominal:      General: Abdomen is flat  Palpations: Abdomen is soft  Tenderness: There is no abdominal tenderness  Genitourinary:     Comments: The external female genitalia is normal  The bartholin's, uretheral and skenes glands are normal  The urethral meatus is normal (midline with no lesions)  Anus without fissure or lesion  Speculum exam reveals vagina without lesion or discharge  Cervix is normal appearing without visible lesions  PAP performed  No bleeding  No significant cystocele or rectocele noted  Bimanual exam notes a uterus with normal contour, mobility  No tenderness  Adnexa without masses or tenderness  Bladder is without fullness, mass or tenderness  Neurological:      Mental Status: She is alert

## 2022-06-29 NOTE — ASSESSMENT & PLAN NOTE
49-year-old with history of CIN2 diagnosed in 2009  Negative PAP performed in June 2021  Repeat 1 year follow-up PAP performed today  Clinical exam benign  I will call the patient with the results of her PAP smear and plan follow-up accordingly

## 2022-07-08 LAB
LAB AP GYN PRIMARY INTERPRETATION: NORMAL
Lab: NORMAL

## 2022-07-11 NOTE — RESULT ENCOUNTER NOTE
Patient notified of results  Will plan for 1 additional PAP in July 2023 with gyn-onc  If negative, can return to routine gyn care with Clara/Chuy's stephanie

## 2022-07-14 DIAGNOSIS — E66.9 OBESITY, CLASS I, BMI 30-34.9: ICD-10-CM

## 2022-07-14 DIAGNOSIS — E28.2 PCOS (POLYCYSTIC OVARIAN SYNDROME): ICD-10-CM

## 2022-07-14 DIAGNOSIS — E16.1 HYPERINSULINEMIA: ICD-10-CM

## 2022-07-14 RX ORDER — PHENTERMINE HYDROCHLORIDE 15 MG/1
15 CAPSULE ORAL EVERY MORNING
Qty: 30 CAPSULE | Refills: 0 | Status: SHIPPED | OUTPATIENT
Start: 2022-07-14 | End: 2022-09-08

## 2022-07-27 ENCOUNTER — TELEMEDICINE (OUTPATIENT)
Dept: FAMILY MEDICINE CLINIC | Facility: CLINIC | Age: 27
End: 2022-07-27
Payer: COMMERCIAL

## 2022-07-27 VITALS — HEIGHT: 63 IN | BODY MASS INDEX: 32.78 KG/M2 | WEIGHT: 185 LBS | TEMPERATURE: 98.7 F

## 2022-07-27 DIAGNOSIS — B34.9 VIRAL INFECTION, UNSPECIFIED: Primary | ICD-10-CM

## 2022-07-27 PROCEDURE — U0005 INFEC AGEN DETEC AMPLI PROBE: HCPCS | Performed by: NURSE PRACTITIONER

## 2022-07-27 PROCEDURE — U0003 INFECTIOUS AGENT DETECTION BY NUCLEIC ACID (DNA OR RNA); SEVERE ACUTE RESPIRATORY SYNDROME CORONAVIRUS 2 (SARS-COV-2) (CORONAVIRUS DISEASE [COVID-19]), AMPLIFIED PROBE TECHNIQUE, MAKING USE OF HIGH THROUGHPUT TECHNOLOGIES AS DESCRIBED BY CMS-2020-01-R: HCPCS | Performed by: NURSE PRACTITIONER

## 2022-07-27 PROCEDURE — 99213 OFFICE O/P EST LOW 20 MIN: CPT | Performed by: NURSE PRACTITIONER

## 2022-07-27 NOTE — ASSESSMENT & PLAN NOTE
Positive exposure to covid, sx started last night  Tmax 99 5, sore throat  She has taken tylenol and nyquil  Since exposure she has had 3 neg tests at home, would like pcr  Pt to come ot office today for swab  Advised vit c, vit d, zinc, tea with honey  Will f/u pending results

## 2022-07-28 LAB — SARS-COV-2 RNA RESP QL NAA+PROBE: POSITIVE

## 2022-08-05 NOTE — TELEPHONE ENCOUNTER
Started in error Mirvaso Counseling: Mirvaso is a topical medication which can decrease superficial blood flow where applied. Side effects are uncommon and include stinging, redness and allergic reactions.

## 2022-09-01 ENCOUNTER — TELEPHONE (OUTPATIENT)
Dept: BARIATRICS | Facility: CLINIC | Age: 27
End: 2022-09-01

## 2022-09-01 NOTE — TELEPHONE ENCOUNTER
Called and LVM for pt to remind them of their EGD on 9/14  Let pt know to call us if they can no longer make procedure  Also R/S review appt to Sherry instead of Dr CLANCY [facility]  He will not be here that day

## 2022-09-08 ENCOUNTER — OFFICE VISIT (OUTPATIENT)
Dept: BARIATRICS | Facility: CLINIC | Age: 27
End: 2022-09-08
Payer: COMMERCIAL

## 2022-09-08 VITALS
DIASTOLIC BLOOD PRESSURE: 76 MMHG | SYSTOLIC BLOOD PRESSURE: 122 MMHG | BODY MASS INDEX: 33.24 KG/M2 | RESPIRATION RATE: 14 BRPM | HEART RATE: 77 BPM | WEIGHT: 187.6 LBS | HEIGHT: 63 IN | OXYGEN SATURATION: 99 %

## 2022-09-08 DIAGNOSIS — E66.9 OBESITY, CLASS I, BMI 30-34.9: Primary | ICD-10-CM

## 2022-09-08 PROCEDURE — 99214 OFFICE O/P EST MOD 30 MIN: CPT | Performed by: PHYSICIAN ASSISTANT

## 2022-09-08 RX ORDER — PHENTERMINE HYDROCHLORIDE 37.5 MG/1
37.5 TABLET ORAL DAILY
Qty: 30 TABLET | Refills: 2 | Status: SHIPPED | OUTPATIENT
Start: 2022-09-08

## 2022-09-08 NOTE — ASSESSMENT & PLAN NOTE
-Patient is pursuing Conservative Program s/p Vertical Silvana Back Dr Espinoza Presume 8/13/2019    -Initial weight loss goal of 5-10% weight loss for improved health  -Screening labs 5/21/22    Initial:186  Current:187 6  Change+1 6  Goal:    Food log (ie ) www myfitnesspal com- [employer]-weigh and measure, 1200 calories   Increase to  At least 64oz of water daily  Continue current exercise and increase intensity    Medications  -to wean off topamax  Take 1/2 tablet of topamax 2 times a day for 2 weeks, then 1/2 tablet at night for 1 week  Then stoptopamax contract signed  Negative urine preg in office Patient is aware of the teratogenicity of Topamax and condom use was recommended while on the medication     -to increase phentermine to 37 5 mg  She has been taking for last month and does not see much change  Phentermine has as potential side effects of increased heart rate, increased blood pressure, palpitations, headache, dizziness, insomnia, altered mood, abdominal upset, and dry mouth  Notify the provider with change in mood  ER with SI/HI  Phentermine should be stopped prior to surgery  Notify the provider with any changes in vision  Prior to prescription a query of [place] PDMP was made    There was no abnormalities found that would not prevent authorization on current prescription

## 2022-09-08 NOTE — PATIENT INSTRUCTIONS
Take 1/2 tablet of topamax 2 times a day for 2 weeks, then 1/2 tablet at night for 1 week   Then stop    Try to start food logging  Increase water intake

## 2022-09-13 RX ORDER — SODIUM CHLORIDE 9 MG/ML
125 INJECTION, SOLUTION INTRAVENOUS CONTINUOUS
Status: CANCELLED | OUTPATIENT
Start: 2022-09-13

## 2022-09-20 ENCOUNTER — TELEPHONE (OUTPATIENT)
Dept: BARIATRICS | Facility: CLINIC | Age: 27
End: 2022-09-20

## 2022-09-20 NOTE — TELEPHONE ENCOUNTER
Reached out to Pt re: scheduled appt  Left VM asking Pt if she had EGD completed and told her if she had not there isn't a reason for her to come to her 9/30 appt  Requested Pt contact office to clarify

## 2022-10-11 PROBLEM — J01.00 ACUTE NON-RECURRENT MAXILLARY SINUSITIS: Status: RESOLVED | Noted: 2022-04-05 | Resolved: 2022-10-11

## 2022-10-17 ENCOUNTER — TELEPHONE (OUTPATIENT)
Dept: FAMILY MEDICINE CLINIC | Facility: CLINIC | Age: 27
End: 2022-10-17

## 2022-10-17 DIAGNOSIS — L70.0 ACNE VULGARIS: ICD-10-CM

## 2022-10-17 DIAGNOSIS — N92.0 MENORRHAGIA WITH REGULAR CYCLE: ICD-10-CM

## 2022-10-17 RX ORDER — ETONOGESTREL AND ETHINYL ESTRADIOL 11.7; 2.7 MG/1; MG/1
1 INSERT, EXTENDED RELEASE VAGINAL
Qty: 3 EACH | Refills: 0 | Status: SHIPPED | OUTPATIENT
Start: 2022-10-17 | End: 2022-10-26 | Stop reason: SDUPTHER

## 2022-10-17 NOTE — TELEPHONE ENCOUNTER
Patient called and was asking when she should get her covid booster due to her getting flu shot on Thursday 10/20/22  Please advise

## 2022-10-20 ENCOUNTER — IMMUNIZATIONS (OUTPATIENT)
Dept: FAMILY MEDICINE CLINIC | Facility: CLINIC | Age: 27
End: 2022-10-20
Payer: COMMERCIAL

## 2022-10-20 DIAGNOSIS — Z23 ENCOUNTER FOR IMMUNIZATION: Primary | ICD-10-CM

## 2022-10-20 PROCEDURE — 90471 IMMUNIZATION ADMIN: CPT

## 2022-10-20 PROCEDURE — 90686 IIV4 VACC NO PRSV 0.5 ML IM: CPT

## 2022-10-26 DIAGNOSIS — L70.0 ACNE VULGARIS: ICD-10-CM

## 2022-10-26 DIAGNOSIS — N92.0 MENORRHAGIA WITH REGULAR CYCLE: ICD-10-CM

## 2022-10-27 RX ORDER — ETONOGESTREL AND ETHINYL ESTRADIOL .12; .015 MG/D; MG/D
1 RING VAGINAL
Qty: 3 EACH | Refills: 0 | Status: SHIPPED | OUTPATIENT
Start: 2022-10-27

## 2022-11-09 DIAGNOSIS — L70.0 ACNE VULGARIS: ICD-10-CM

## 2022-11-09 DIAGNOSIS — N92.0 MENORRHAGIA WITH REGULAR CYCLE: ICD-10-CM

## 2022-11-09 RX ORDER — ETONOGESTREL AND ETHINYL ESTRADIOL .12; .015 MG/D; MG/D
1 RING VAGINAL
Qty: 3 EACH | Refills: 0 | Status: SHIPPED | OUTPATIENT
Start: 2022-11-09 | End: 2022-11-17

## 2022-11-14 ENCOUNTER — TELEPHONE (OUTPATIENT)
Dept: OBGYN CLINIC | Facility: CLINIC | Age: 27
End: 2022-11-14

## 2022-11-14 NOTE — TELEPHONE ENCOUNTER
Pt lmom - 4th or 5th time calling office in regards to birth control prescription  Every time calls the office says going to need pre-authorization and whoever I speak with says will be handled and any difficulties will be called back  Has tried to get refilled since 10/27 and every time [facility] to [facility] is told waiting for office to get back to them  most recently, called last Wednesday and was told a provider would sign off on it  Not sure if disconnect between [facility] and office or what is going on but now does not have birth control and is frustrated

## 2022-11-14 NOTE — TELEPHONE ENCOUNTER
Spoke to patient, told her prior Hay Redo was initiated again and it could take up to 5 days to here from them

## 2022-11-14 NOTE — TELEPHONE ENCOUNTER
Contact plan to follow up on [id number]  Prior auth initiated  Will take up to 5 days to here from plan

## 2022-11-17 DIAGNOSIS — N92.0 MENORRHAGIA WITH REGULAR CYCLE: Primary | ICD-10-CM

## 2022-11-17 DIAGNOSIS — L70.0 ACNE VULGARIS: ICD-10-CM

## 2022-11-17 RX ORDER — ETONOGESTREL/ETHINYL ESTRADIOL .12-.015MG
RING, VAGINAL VAGINAL
Qty: 1 EACH | Refills: 3 | Status: SHIPPED | OUTPATIENT
Start: 2022-11-17

## 2022-12-04 NOTE — PROGRESS NOTES
Med tolerated, refilled with higher dose to metformin 750mg qAM as previously planned  Call if issues or SE with new higher dose 
full weight-bearing

## 2022-12-16 ENCOUNTER — PREP FOR PROCEDURE (OUTPATIENT)
Dept: BARIATRICS | Facility: CLINIC | Age: 27
End: 2022-12-16

## 2022-12-16 DIAGNOSIS — Z98.84 BARIATRIC SURGERY STATUS: Primary | ICD-10-CM

## 2022-12-28 ENCOUNTER — OCCMED (OUTPATIENT)
Dept: URGENT CARE | Facility: CLINIC | Age: 27
End: 2022-12-28

## 2022-12-28 ENCOUNTER — APPOINTMENT (OUTPATIENT)
Dept: LAB | Facility: CLINIC | Age: 27
End: 2022-12-28

## 2022-12-28 DIAGNOSIS — Z02.1 PHYSICAL EXAM, PRE-EMPLOYMENT: Primary | ICD-10-CM

## 2022-12-28 DIAGNOSIS — Z02.1 PHYSICAL EXAM, PRE-EMPLOYMENT: ICD-10-CM

## 2022-12-28 LAB
MEV IGG SER QL IA: NORMAL
MUV IGG SER QL IA: ABNORMAL
RUBV IGG SERPL IA-ACNC: 13.3 IU/ML
VZV IGG SER QL IA: ABNORMAL

## 2022-12-30 LAB
GAMMA INTERFERON BACKGROUND BLD IA-ACNC: 0.06 IU/ML
M TB IFN-G BLD-IMP: NEGATIVE
M TB IFN-G CD4+ BCKGRND COR BLD-ACNC: -0.02 IU/ML
M TB IFN-G CD4+ BCKGRND COR BLD-ACNC: 0 IU/ML
MITOGEN IGNF BCKGRD COR BLD-ACNC: >10 IU/ML

## 2023-01-13 ENCOUNTER — OFFICE VISIT (OUTPATIENT)
Dept: BARIATRICS | Facility: CLINIC | Age: 28
End: 2023-01-13

## 2023-01-13 VITALS
SYSTOLIC BLOOD PRESSURE: 108 MMHG | DIASTOLIC BLOOD PRESSURE: 70 MMHG | BODY MASS INDEX: 33.7 KG/M2 | HEART RATE: 87 BPM | HEIGHT: 63 IN | TEMPERATURE: 98 F | WEIGHT: 190.2 LBS

## 2023-01-13 DIAGNOSIS — E66.9 OBESITY, CLASS I, BMI 30-34.9: Primary | ICD-10-CM

## 2023-01-13 DIAGNOSIS — E28.2 PCOS (POLYCYSTIC OVARIAN SYNDROME): ICD-10-CM

## 2023-01-13 NOTE — ASSESSMENT & PLAN NOTE
-Patient is pursuing Conservative Program s/p Vertical Latricia Rock 8/13/2019    -Initial weight loss goal of 5-10% weight loss for improved health  -Screening labs 5/21/22    Initial:186  Current:190 2  Change+4 2 lb  Goal:    Goals:    Food log (ie ) www [email or web address] com- [email or web address]-weigh and measure, 1200 calories   -continue to try to drink at least 64oz of water daily   -Continue current exercise program 3 times a week with grow with nir    Medications  -to start on wegovy  Patient denies personal and family history of MCT and MEN2 tumors  Patient denies personal history of pancreatitis  Side effects discussed but not limited to diarrhea, bloating, constipation, GI upset, heartburn, increased heart rate, headache, low blood sugar, fatigue and dizziness   Titration and medication administration discussed     -was on topamax and phentermine prior and did not lose appropriate weight  -was on metformin for PCOS and did not help with weight loss

## 2023-01-13 NOTE — PROGRESS NOTES
Assessment/Plan:    Obesity, Class I, BMI 30-34 9  -Patient is pursuing Conservative Program s/p Karly Rosa 8/13/2019    -Initial weight loss goal of 5-10% weight loss for improved health  -Screening labs 5/21/22    Initial:186  Current:190 2  Change+4 2 lb  Goal:    Goals:    Food log (ie ) www [email or web address] com- [employer]-weigh and measure, 1200 calories   -continue to try to drink at least 64oz of water daily   -Continue current exercise program 3 times a week with grow with nir    Medications  -to start on wegovy  Patient denies personal and family history of MCT and MEN2 tumors  Patient denies personal history of pancreatitis  Side effects discussed but not limited to diarrhea, bloating, constipation, GI upset, heartburn, increased heart rate, headache, low blood sugar, fatigue and dizziness  Titration and medication administration discussed     -was on topamax and phentermine prior and did not lose appropriate weight  -was on metformin for PCOS and did not help with weight loss    PCOS (polycystic ovarian syndrome)  Last glucose, insulin level wnl  Was on metformin prior and did have GI side effects        Follow up in approximately 2 months with Non-Surgical Physician/Advanced Practitioner  Diagnoses and all orders for this visit:    Obesity, Class I, BMI 30-34 9  -     Semaglutide-Weight Management (WEGOVY) 0 25 MG/0 5ML; Inject 0 5 mL (0 25 mg total) under the skin once a week    PCOS (polycystic ovarian syndrome)          Subjective:   Chief Complaint   Patient presents with   • Follow-up     OD [facility]  med F/U         Patient ID: Linda Molina  is a 32 y o  female with excess weight/obesity here to pursue weight managment  Patient is pursuing Conservative Program      HPI s/p Karly Rosa 8/13/2019  Presurgery weight was 236lb     She was on phentermine when last seen but feels it was not helping appropriately and was suppressing her appetite after taking it  Wt Readings from Last 10 Encounters:   01/13/23 86 3 kg (190 lb 3 2 oz)   09/08/22 85 1 kg (187 lb 9 6 oz)   07/27/22 83 9 kg (185 lb)   06/28/22 83 9 kg (185 lb)   05/23/22 83 6 kg (184 lb 6 4 oz)   05/20/22 83 7 kg (184 lb 8 oz)   05/12/22 82 3 kg (181 lb 6 4 oz)   04/05/22 82 8 kg (182 lb 9 6 oz)   02/10/22 80 1 kg (176 lb 9 6 oz)   01/07/22 81 6 kg (179 lb 12 8 oz)       Food logging:  Increased appetite/cravings:  Fruit/Vegetable servings:  Exercise:grow with nir barbara 3 times a week 30 minutes  Hydration:at least 50 oz water    Diet Recal:  B: fiber one bar  L:salad or leftovers  S: (sometimes more hungry depending on lunch)varies more usually a kind bar or fiber bar  D;protein, vegetable and sometimes rice      The following portions of the patient's history were reviewed and updated as appropriate: She  has a past medical history of Abnormal Pap smear of cervix, Acne, Asthma, Bariatric surgery status, Chronic thoracic back pain (11/9/2017), Eczema, Exercises 1 to 2 times per week, External hemorrhoids, Fatigue, Hospital discharge follow-up (8/19/2019), HPV (human papilloma virus) infection, Obesity, PCOS (polycystic ovarian syndrome), and Postsurgical malabsorption    She   Patient Active Problem List    Diagnosis Date Noted   • Viral infection, unspecified 07/27/2022   • Acute nonintractable headache 05/12/2022   • History of COVID-19 01/06/2021   • Menorrhagia with regular cycle 09/29/2020   • Obesity, Class I, BMI 30-34 9 09/16/2020   • Extrinsic asthma without complication 94/41/5503   • Encounter for surgical aftercare following surgery of digestive system 03/16/2020   • Low vitamin B12 level 03/16/2020   • High blood copper level 03/16/2020   • Postsurgical malabsorption 11/25/2019   • Need for vaccination 10/22/2019   • Encounter for gynecological examination (general) (routine) without abnormal findings 09/17/2019   • Bariatric surgery status 09/10/2019   • PCOS (polycystic ovarian syndrome) 07/17/2019   • Annual physical exam 04/25/2019   • Moderate dysplasia of cervix (SALTY II) 01/31/2019   • Hyperinsulinemia 12/19/2018   • Acne vulgaris 04/17/2018   • Hemorrhoids, external 04/21/2017     She  has a past surgical history that includes pr breast reduction (Bilateral, 2/22/2018); Hulbert tooth extraction; Reduction mammaplasty; EGD; and pr laps gstrc rstrictiv px longitudinal gastrectomy (N/A, 8/13/2019)  Her family history includes Breast cancer in her maternal aunt; Cancer in her maternal aunt; Clotting disorder in her mother; Diabetes in her father  She  reports that she has never smoked  She has never used smokeless tobacco  She reports current alcohol use  She reports that she does not use drugs  Current Outpatient Medications   Medication Sig Dispense Refill   • albuterol (Ventolin HFA) 90 mcg/act inhaler Inhale 2 puffs every 6 (six) hours as needed for wheezing 1 Inhaler 1   • clindamycin-benzoyl peroxide (BENZACLIN) gel Apply to face twice daily for acne (Patient taking differently: Apply to face prn for acne) 25 g 1   • hydrocortisone 2 5 % ointment Apply topically 2 (two) times a day 30 g 2   • loratadine (CLARITIN) 10 mg tablet Take 1 tablet (10 mg total) by mouth in the morning  30 tablet 3   • NuvaRing 0 12-0 015 MG/24HR vaginal ring Insert vaginally and leave in place for 3 consecutive weeks, then remove for 1 week  1 each 3   • pimecrolimus (ELIDEL) 1 % cream Apply topically 2 (two) times a day To upper eyelids 30 g 3   • Semaglutide-Weight Management (WEGOVY) 0 25 MG/0 5ML Inject 0 5 mL (0 25 mg total) under the skin once a week 2 mL 0   • spironolactone (ALDACTONE) 50 mg tablet Take 3 tablets (150 mg total) by mouth daily Avoid pregnancy while on medication  90 tablet 2     No current facility-administered medications for this visit       Current Outpatient Medications on File Prior to Visit   Medication Sig   • albuterol (Ventolin HFA) 90 mcg/act inhaler Inhale 2 puffs every 6 (six) hours as needed for wheezing   • clindamycin-benzoyl peroxide (BENZACLIN) gel Apply to face twice daily for acne (Patient taking differently: Apply to face prn for acne)   • hydrocortisone 2 5 % ointment Apply topically 2 (two) times a day   • loratadine (CLARITIN) 10 mg tablet Take 1 tablet (10 mg total) by mouth in the morning  • NuvaRing 0 12-0 015 MG/24HR vaginal ring Insert vaginally and leave in place for 3 consecutive weeks, then remove for 1 week  • pimecrolimus (ELIDEL) 1 % cream Apply topically 2 (two) times a day To upper eyelids   • spironolactone (ALDACTONE) 50 mg tablet Take 3 tablets (150 mg total) by mouth daily Avoid pregnancy while on medication  • [DISCONTINUED] phentermine (ADIPEX-P) 37 5 MG tablet Take 1 tablet (37 5 mg total) by mouth in the morning (Patient not taking: Reported on 1/13/2023)   • [DISCONTINUED] topiramate (TOPAMAX) 50 MG tablet TAKE 1 TABLET (50 MG TOTAL) BY MOUTH IN THE MORNING AND 1 TABLET (50 MG TOTAL) IN THE EVENING  No current facility-administered medications on file prior to visit  She has No Known Allergies       Review of Systems   Constitutional: Negative for fever  Respiratory: Negative for shortness of breath  Cardiovascular: Negative for chest pain and palpitations  Gastrointestinal: Negative for abdominal pain, constipation, diarrhea and vomiting  Genitourinary: Negative for difficulty urinating  Musculoskeletal: Negative for arthralgias and back pain  Skin: Negative for rash  Neurological: Negative for headaches  Psychiatric/Behavioral: Negative for dysphoric mood  The patient is not nervous/anxious  Objective:    /70   Pulse 87   Temp 98 °F (36 7 °C) (Tympanic)   Ht 5' 3" (1 6 m)   Wt 86 3 kg (190 lb 3 2 oz)   BMI 33 69 kg/m²      Physical Exam  Vitals and nursing note reviewed  Constitutional:       General: She is not in acute distress  Appearance: She is well-developed  She is obese  HENT:      Head: Normocephalic and atraumatic  Eyes:      Conjunctiva/sclera: Conjunctivae normal    Neck:      Thyroid: No thyromegaly  Pulmonary:      Effort: Pulmonary effort is normal  No respiratory distress  Skin:     Findings: No rash (visible)  Neurological:      Mental Status: She is alert and oriented to person, place, and time     Psychiatric:         Mood and Affect: Mood normal          Behavior: Behavior normal

## 2023-01-17 ENCOUNTER — TELEPHONE (OUTPATIENT)
Dept: BARIATRICS | Facility: CLINIC | Age: 28
End: 2023-01-17

## 2023-01-17 ENCOUNTER — TELEPHONE (OUTPATIENT)
Dept: OBGYN CLINIC | Facility: CLINIC | Age: 28
End: 2023-01-17

## 2023-01-17 NOTE — TELEPHONE ENCOUNTER
Left message for pt to call the office  Upon chart review it looks like she is scheduled to have her annual with pap 7/2023 with GYN ONC  She is not due for her annual at this time and wanted to see if she was having an issue where she would need to be seen in our office

## 2023-01-17 NOTE — PROGRESS NOTES
Assessment/Plan   Problem List Items Addressed This Visit        Endocrine    PCOS (polycystic ovarian syndrome)       Musculoskeletal and Integument    Acne vulgaris    Relevant Medications    NuvaRing 0 12-0 015 MG/24HR vaginal ring       Other    Menorrhagia with regular cycle    Relevant Medications    NuvaRing 0 12-0 015 MG/24HR vaginal ring   Other Visit Diagnoses     Well woman exam    -  Primary          Discussion    All questions have been answered to her satisfaction  RTO for APE or sooner if needed  Will plan 6 month pap with gyn/onc  If all WNL will return to us for routine GYN care yearly  Genital culture desai per pt request  Advised trial of coconut oil as well as probiotic  We reviewed PCOS and anovulation as well as addition of nain inositol to help regulate cycles and aid in conception  Reviewed breast pain may be in relation to nerve pain s/p breast surgery  Can make an appt when symptomatic to review and examine  Subjective     HPI   Daisy Jamil is a 32 y o  female who presents for annual well woman exam      LMP -1/12/23 ; Periods are reg q 28 days and last 3 days; No excessive bleeding; No intermenstrual bleeding or spotting; Cramps are tolerable  Pt does note that periods are not as heavy on the nuva ring  She does note some mild itching that began this past weekend  No d/c, no odor, no STD concerns  Desires screening for yeast     Pt does endorse a h/o b/l breast reduction  She notes that she will sporadically have pain and itching around her nipples  No associated rash or lesions  Does occur b/l  No abd/pelvic pain or HAs;     Pt is sexually active in a mutually monog sexual relationship; No issues with intercourse; She declines sti/hiv/hep testing; Feels safe at home    Current contraception: uses nuva ring for acne PCOS and menorrhagia    Pt questions pregnancy although they are not ready yet     We reviewed preconceptually and need for folic acid 3 months prior to conception  We reviewed carrier screening which pt will consider  We reviewed PCOS and anovulation as well as addition of nain inositol to help regulate cycles and aid in conception  Gardasil - received series   (+) PCP for routine Bw/care; Last Pap - 22 WNL neg HPV  History of abnormal Pap smear: h/o SALTY 2 diagnosed       Review of Systems   Constitutional: Negative  Respiratory: Negative  Gastrointestinal: Negative  Endocrine: Negative  Genitourinary: Negative          The following portions of the patient's history were reviewed and updated as appropriate: allergies, current medications, past family history, past medical history, past social history, past surgical history and problem list          OB History        0    Para   0    Term   0       0    AB   0    Living   0       SAB   0    IAB   0    Ectopic   0    Multiple   0    Live Births   0                 Past Medical History:   Diagnosis Date   • Abnormal Pap smear of cervix      - HSIL/CIN2-3   • Acne    • Asthma     exercise induced asthma/no recent problems   • Bariatric surgery status    • Chronic thoracic back pain 2017   • Eczema    • Exercises 1 to 2 times per week     occas 3x/week   • External hemorrhoids     last assessed 8/15/12; resolved 14   • Fatigue     last assessed 13; resolved 14   • Hospital discharge follow-up 2019   • HPV (human papilloma virus) infection     2019 (+) HRHPV type 16/18 negative   • Obesity    • PCOS (polycystic ovarian syndrome)    • Postsurgical malabsorption        Past Surgical History:   Procedure Laterality Date   • EGD     • GA BREAST REDUCTION Bilateral 2018    Procedure: BREAST REDUCTION;  Surgeon: Fransisco Dave MD;  Location: [facility];  Service: Plastics   • GA [address] LONGITUDINAL GASTRECTOMY N/A 2019    Procedure: GASTRECTOMY LAPAROSCOPIC SLEEVE WITH ROBOTICS;  Surgeon: Cathie Remy MD;  Location[facility] OR;  Service: Bariatrics   • REDUCTION MAMMAPLASTY     • WISDOM TOOTH EXTRACTION         Family History   Problem Relation Age of Onset   • Clotting disorder Mother         with menstration   • Diabetes Father    • Breast cancer Maternal Aunt    • Cancer Maternal Aunt         breast cancer   • Hypertension Neg Hx    • Heart disease Neg Hx    • Thyroid disease Neg Hx    • Stroke Neg Hx        Social History     Socioeconomic History   • Marital status: Single     Spouse name: Not on file   • Number of children: Not on file   • Years of education: Not on file   • Highest education level: Not on file   Occupational History   • Not on file   Tobacco Use   • Smoking status: Never   • Smokeless tobacco: Never   Vaping Use   • Vaping Use: Never used   Substance and Sexual Activity   • Alcohol use: Yes     Comment: social   • Drug use: No   • Sexual activity: Yes     Partners: Male     Birth control/protection: Ring   Other Topics Concern   • Not on file   Social History Narrative   • Not on file     Social Determinants of Health     Financial Resource Strain: Not on file   Food Insecurity: Not on file   Transportation Needs: Not on file   Physical Activity: Not on file   Stress: Not on file   Social Connections: Not on file   Intimate Partner Violence: Not on file   Housing Stability: Not on file         Current Outpatient Medications:   •  albuterol (Ventolin HFA) 90 mcg/act inhaler, Inhale 2 puffs every 6 (six) hours as needed for wheezing, Disp: 1 Inhaler, Rfl: 1  •  clindamycin-benzoyl peroxide (BENZACLIN) gel, Apply to face twice daily for acne (Patient taking differently: Apply to face prn for acne), Disp: 25 g, Rfl: 1  •  hydrocortisone 2 5 % ointment, Apply topically 2 (two) times a day, Disp: 30 g, Rfl: 2  •  loratadine (CLARITIN) 10 mg tablet, Take 1 tablet (10 mg total) by mouth in the morning , Disp: 30 tablet, Rfl: 3  •  NuvaRing 0 12-0 015 MG/24HR vaginal ring, Insert vaginally and leave in place for 3 consecutive weeks, then remove for 1 week , Disp: 1 each, Rfl: 11  •  pimecrolimus (ELIDEL) 1 % cream, Apply topically 2 (two) times a day To upper eyelids, Disp: 30 g, Rfl: 3  •  Semaglutide-Weight Management (WEGOVY) 0 25 MG/0 5ML, Inject 0 5 mL (0 25 mg total) under the skin once a week, Disp: 2 mL, Rfl: 0  •  spironolactone (ALDACTONE) 50 mg tablet, Take 3 tablets (150 mg total) by mouth daily Avoid pregnancy while on medication  , Disp: 90 tablet, Rfl: 2    No Known Allergies    Objective   Vitals:    01/18/23 0803   BP: 118/78   BP Location: Left arm   Patient Position: Sitting   Cuff Size: Standard   Weight: 86 5 kg (190 lb 9 6 oz)   Height: 5' 3" (1 6 m)     Physical Exam  Vitals reviewed  HENT:      Head: Normocephalic and atraumatic  Cardiovascular:      Rate and Rhythm: Normal rate and regular rhythm  Pulmonary:      Effort: Pulmonary effort is normal       Breath sounds: Normal breath sounds  Chest:   Breasts:     Breasts are symmetrical       Right: No swelling, bleeding, inverted nipple, mass, nipple discharge, skin change or tenderness  Left: No swelling, bleeding, inverted nipple, mass, nipple discharge, skin change or tenderness  Abdominal:      General: Abdomen is flat  Bowel sounds are normal       Palpations: Abdomen is soft  Tenderness: There is no abdominal tenderness  There is no right CVA tenderness, left CVA tenderness or guarding  Genitourinary:     General: Normal vulva  Pubic Area: No rash  Labia:         Right: No rash, tenderness, lesion or injury  Left: No rash, tenderness, lesion or injury  Urethra: No prolapse, urethral pain, urethral swelling or urethral lesion  Vagina: Normal  No signs of injury and foreign body  No vaginal discharge or erythema  Cervix: Normal       Uterus: Normal        Adnexa: Right adnexa normal and left adnexa normal    Musculoskeletal:      Cervical back: Neck supple     Lymphadenopathy:      Upper Body: Right upper body: No axillary adenopathy  Left upper body: No axillary adenopathy  Skin:     General: Skin is warm and dry  Neurological:      Mental Status: She is alert and oriented to person, place, and time  Psychiatric:         Mood and Affect: Mood normal          Behavior: Behavior normal          Thought Content: Thought content normal          Judgment: Judgment normal          There are no Patient Instructions on file for this visit

## 2023-01-18 ENCOUNTER — ANNUAL EXAM (OUTPATIENT)
Dept: OBGYN CLINIC | Facility: CLINIC | Age: 28
End: 2023-01-18

## 2023-01-18 VITALS
DIASTOLIC BLOOD PRESSURE: 78 MMHG | WEIGHT: 190.6 LBS | BODY MASS INDEX: 33.77 KG/M2 | SYSTOLIC BLOOD PRESSURE: 118 MMHG | HEIGHT: 63 IN

## 2023-01-18 DIAGNOSIS — N92.0 MENORRHAGIA WITH REGULAR CYCLE: ICD-10-CM

## 2023-01-18 DIAGNOSIS — L70.0 ACNE VULGARIS: ICD-10-CM

## 2023-01-18 DIAGNOSIS — E28.2 PCOS (POLYCYSTIC OVARIAN SYNDROME): ICD-10-CM

## 2023-01-18 DIAGNOSIS — Z01.419 WELL WOMAN EXAM: Primary | ICD-10-CM

## 2023-01-18 RX ORDER — ETONOGESTREL/ETHINYL ESTRADIOL .12-.015MG
RING, VAGINAL VAGINAL
Qty: 1 EACH | Refills: 11 | Status: SHIPPED | OUTPATIENT
Start: 2023-01-18

## 2023-01-21 LAB — BACTERIA GENITAL AEROBE CULT: NORMAL

## 2023-01-24 ENCOUNTER — TELEPHONE (OUTPATIENT)
Dept: BARIATRICS | Facility: CLINIC | Age: 28
End: 2023-01-24

## 2023-01-24 NOTE — TELEPHONE ENCOUNTER
Called and LVM to ensure pt knew their EGD was scheduled on 2-1  Let pt know to call us with any concerns or questions

## 2023-01-31 ENCOUNTER — PATIENT MESSAGE (OUTPATIENT)
Dept: BARIATRICS | Facility: CLINIC | Age: 28
End: 2023-01-31

## 2023-01-31 DIAGNOSIS — E16.1 HYPERINSULINEMIA: ICD-10-CM

## 2023-01-31 DIAGNOSIS — E66.9 OBESITY, CLASS I, BMI 30-34.9: ICD-10-CM

## 2023-01-31 DIAGNOSIS — E66.9 OBESITY, CLASS I, BMI 30-34.9: Primary | ICD-10-CM

## 2023-01-31 RX ORDER — SODIUM CHLORIDE 9 MG/ML
125 INJECTION, SOLUTION INTRAVENOUS CONTINUOUS
OUTPATIENT
Start: 2023-01-31

## 2023-03-15 ENCOUNTER — OFFICE VISIT (OUTPATIENT)
Dept: BARIATRICS | Facility: CLINIC | Age: 28
End: 2023-03-15

## 2023-03-15 VITALS
RESPIRATION RATE: 16 BRPM | DIASTOLIC BLOOD PRESSURE: 60 MMHG | HEART RATE: 80 BPM | BODY MASS INDEX: 32.32 KG/M2 | WEIGHT: 182.4 LBS | HEIGHT: 63 IN | SYSTOLIC BLOOD PRESSURE: 110 MMHG

## 2023-03-15 DIAGNOSIS — E53.8 LOW VITAMIN B12 LEVEL: ICD-10-CM

## 2023-03-15 DIAGNOSIS — E66.9 OBESITY, CLASS I, BMI 30-34.9: Primary | ICD-10-CM

## 2023-03-15 DIAGNOSIS — E16.1 HYPERINSULINEMIA: ICD-10-CM

## 2023-03-15 DIAGNOSIS — Z98.84 BARIATRIC SURGERY STATUS: Chronic | ICD-10-CM

## 2023-03-15 DIAGNOSIS — E28.2 PCOS (POLYCYSTIC OVARIAN SYNDROME): ICD-10-CM

## 2023-03-15 NOTE — PROGRESS NOTES
Assessment/Plan:  Jonathan Avila was seen today for follow-up  Diagnoses and all orders for this visit:    Obesity, Class I, BMI 30-34 9  -     Semaglutide-Weight Management (WEGOVY) 1 MG/0 5ML; Inject 0 5 mL (1 mg total) under the skin once a week Do not start before March 29, 2023  Bariatric surgery status    PCOS (polycystic ovarian syndrome)    Hyperinsulinemia    Low vitamin B12 level       Obesity, Class I, BMI 30-34 9    - Labs reviewed  TSH WNL  BG/A1C normal   As below  General Recommendations:  Nutrition:  Eat breakfast daily  Do not skip meals  Food log (ie ) www myfitnesspal com, sparkpeople  com, loseit com, calorieking  com, etc     Practice mindful eating  Be sure to set aside time to eat, eat slowly, and savor your food  Hydration: At least 64oz of water daily  No sugar sweetened beverages  No juice (eat the fruit instead)  Exercise:  Studies have shown that the ideal exercise goal is somewhere between 150 to 300 minutes of moderate intensity exercise a week  Start with exercising 10 minutes every other day and gradually increase physical activity with a goal of at least 150 minutes of moderate intensity exercise a week, divided over at least 3 days a week  An example of this would be exercising 30 minutes a day, 5 days a week  Resistance training can increase muscle mass and increase our resting metabolic rate  FULL BODY resistance training is recommended 2-3 times a week  Do not do this on consecutive days to allow for muscle recovery  Aim for a bare minimum 5000 steps, even on days you do not exercise  Monitoring:   Weigh yourself daily  If this causes undue stress, then just weigh yourself once a week  Weigh yourself the same time of the day with the same amount of clothing on  Preferably this should be done after waking up, before you eat, and with no clothing or minimal clothing on  Specific Goals:  Food log once or twice a week    1300 calorie a day goal  Meal plan provided  Step count  [employer] can help you find new places to explore  Increase wegovy to 1mg weekly  Contact the office with an update 3 weeks after this increase  Return visit:  2-3 months         ______________________________________________________________________        Subjective:   Chief Complaint   Patient presents with   • Follow-up     MWM 2mth f/u; waist     Patient here to discuss weight associated problems and nutrition goals  HPI: Andre Olson  is a 32 y o  female with history of PCOS, hyperinsulinemia, and excess weight  The patient has been following with the medical weight management clinic since 2018  This is my first time seeing the patient as a follow-up appointment  Most recent notes and records were reviewed  She underwent a laparoscopic sleeve gastrectomy by Dr Paulo Bell on 8/13/2019  She was previously on topiramate and phentermine  Weight before surgery 244  Sergio 158  Last   Current 182  Down 8lbs since starting wegovy  Has taken 5 dose of wegovy 0 5mg  No AR or SE  Effective  Wt Readings from Last 10 Encounters:   03/15/23 82 7 kg (182 lb 6 4 oz)   01/18/23 86 5 kg (190 lb 9 6 oz)   01/13/23 86 3 kg (190 lb 3 2 oz)   09/08/22 85 1 kg (187 lb 9 6 oz)   07/27/22 83 9 kg (185 lb)   06/28/22 83 9 kg (185 lb)   05/23/22 83 6 kg (184 lb 6 4 oz)   05/20/22 83 7 kg (184 lb 8 oz)   05/12/22 82 3 kg (181 lb 6 4 oz)   04/05/22 82 8 kg (182 lb 9 6 oz)     Does not regularly food log   6068-3181 goal  Hunger/Cravings: No  Diminished  Dining out:   Hydration: Water 60oz a day  Alcohol: 1 a week  Exercise: Walk 2-3 days a week 30 min  Sleep: 6 5-7  Weight Monitoring:  Weekly    Patient denies personal and family history of  pancreatitis, thyroid cancer, MEN-2 tumors  Denies any hx of glaucoma, seizures, kidney stones, gallstones  Denies Hx of CAD, PAD, palpitations, arrhythmia     Denies uncontrolled anxiety or depression, suicidal ideation or behavior, insomnia or sleep disturbance  The following portions of the patient's history were reviewed and updated as appropriate: allergies, current medications, past family history, past medical history, past social history, past surgical history, and problem list     Review Of Systems:  Review of Systems   Constitutional: Negative for activity change, appetite change, fatigue and fever  Respiratory: Negative for cough and shortness of breath  Cardiovascular: Negative for chest pain, palpitations and leg swelling  Gastrointestinal: Negative for abdominal pain, constipation, diarrhea, nausea and vomiting  Endocrine: Negative for cold intolerance and heat intolerance  Genitourinary: Negative for difficulty urinating and dysuria  Musculoskeletal: Negative for arthralgias, back pain and gait problem  Skin: Negative for pallor and rash  Neurological: Negative for headaches  Psychiatric/Behavioral: Negative for dysphoric mood, sleep disturbance and suicidal ideas (or HI)  The patient is not nervous/anxious  Objective:  /60   Pulse 80   Resp 16   Ht 5' 3" (1 6 m)   Wt 82 7 kg (182 lb 6 4 oz)   BMI 32 31 kg/m²   Physical Exam  Vitals and nursing note reviewed  Constitutional:       General: She is not in acute distress  Appearance: Normal appearance  She is not ill-appearing or diaphoretic  Eyes:      General: No scleral icterus  Cardiovascular:      Rate and Rhythm: Normal rate and regular rhythm  Pulses: Normal pulses  Heart sounds: No murmur heard  Pulmonary:      Effort: Pulmonary effort is normal  No respiratory distress  Breath sounds: Normal breath sounds  No wheezing or rhonchi  Abdominal:      General: Bowel sounds are normal  There is no distension  Palpations: Abdomen is soft  There is no mass  Tenderness: There is no abdominal tenderness  Musculoskeletal:      Cervical back: Neck supple  Right lower leg: No edema        Left lower leg: No edema  Lymphadenopathy:      Cervical: No cervical adenopathy  Skin:     Capillary Refill: Capillary refill takes less than 2 seconds  Findings: No lesion or rash  Neurological:      Mental Status: She is alert and oriented to person, place, and time  Gait: Gait normal    Psychiatric:         Mood and Affect: Mood normal          Behavior: Behavior normal          Labs and Imaging 5/21/22  Recent labs and imaging have been personally reviewed    Lab Results   Component Value Date    WBC 6 64 05/21/2022    HGB 13 1 05/21/2022    HCT 39 9 05/21/2022    MCV 92 05/21/2022     05/21/2022     Lab Results   Component Value Date    SODIUM 141 05/21/2022    K 4 0 05/21/2022     (H) 05/21/2022    CO2 26 05/21/2022    AGAP 6 05/21/2022    BUN 16 05/21/2022    CREATININE 0 73 05/21/2022    GLUC 126 08/14/2019    GLUF 74 05/21/2022    CALCIUM 8 7 05/21/2022    AST 14 05/21/2022    ALT 12 05/21/2022    ALKPHOS 55 05/21/2022    TP 6 7 05/21/2022    TBILI 0 45 05/21/2022    EGFR 112 05/21/2022     Lab Results   Component Value Date    HGBA1C 4 8 07/23/2021     Lab Results   Component Value Date    OWC9MUYGYDME 1 334 07/23/2021

## 2023-03-15 NOTE — ASSESSMENT & PLAN NOTE
- Labs reviewed  TSH WNL  BG/A1C normal   As below  General Recommendations:  Nutrition:  Eat breakfast daily  Do not skip meals  Food log (ie ) www myfitnesspal com, sparkpeople  com, loseit com, calorieking  com, etc     Practice mindful eating  Be sure to set aside time to eat, eat slowly, and savor your food  Hydration: At least 64oz of water daily  No sugar sweetened beverages  No juice (eat the fruit instead)  Exercise:  Studies have shown that the ideal exercise goal is somewhere between 150 to 300 minutes of moderate intensity exercise a week  Start with exercising 10 minutes every other day and gradually increase physical activity with a goal of at least 150 minutes of moderate intensity exercise a week, divided over at least 3 days a week  An example of this would be exercising 30 minutes a day, 5 days a week  Resistance training can increase muscle mass and increase our resting metabolic rate  FULL BODY resistance training is recommended 2-3 times a week  Do not do this on consecutive days to allow for muscle recovery  Aim for a bare minimum 5000 steps, even on days you do not exercise  Monitoring:   Weigh yourself daily  If this causes undue stress, then just weigh yourself once a week  Weigh yourself the same time of the day with the same amount of clothing on  Preferably this should be done after waking up, before you eat, and with no clothing or minimal clothing on  Specific Goals:  Food log once or twice a week  1300 calorie a day goal   Meal plan provided  Step count  [employer] can help you find new places to explore  Increase wegovy to 1mg weekly  Contact the office with an update 3 weeks after this increase        Return visit:  2-3 months

## 2023-03-15 NOTE — PATIENT INSTRUCTIONS
Food log once or twice a week  1300 calorie a day goal   Meal plan provided  Step count  [employer] can help you find new places to explore  Increase wegovy to 1mg weekly  Contact the office with an update 3 weeks after this increase

## 2023-03-16 ENCOUNTER — TELEPHONE (OUTPATIENT)
Dept: BARIATRICS | Facility: CLINIC | Age: 28
End: 2023-03-16

## 2023-03-16 NOTE — TELEPHONE ENCOUNTER
Patient and pharm called regarding the [facility] approval  She was also informed of the 4-5% weight loss requirement for renewal purposes

## 2023-03-28 ENCOUNTER — APPOINTMENT (OUTPATIENT)
Dept: LAB | Facility: CLINIC | Age: 28
End: 2023-03-28

## 2023-03-28 ENCOUNTER — TELEMEDICINE (OUTPATIENT)
Dept: FAMILY MEDICINE CLINIC | Facility: CLINIC | Age: 28
End: 2023-03-28

## 2023-03-28 DIAGNOSIS — B34.9 VIRAL INFECTION, UNSPECIFIED: Primary | ICD-10-CM

## 2023-03-28 NOTE — LETTER
March 28, 2023     Patient: Phu Tovar  YOB: 1995  Date of Visit: 3/28/2023      To Whom it May Concern:    Moi Fiore is under my professional care  Sobeida was seen in my office on 3/28/2023  Lalito Boyd may return to work on her next scheduled shift       If you have any questions or concerns, please don't hesitate to call           Sincerely,          MARIZA Mike        CC: No Recipients

## 2023-03-28 NOTE — PROGRESS NOTES
COVID-19 Outpatient Progress Note    Assessment/Plan:    Problem List Items Addressed This Visit        Other    Viral infection, unspecified - Primary     Onset 3/27 with scratchy throat that is now resolved, tmax 99 4, headache, sinus pressure, fatigue, cough, post-nasal drip, hoarseness  Denies body aches, GI symptoms  Home test negative yesterday, she is taking acetaminophen cold/flu prn  Would like to retest with covid pcr  Continue taking vitamins, encourage hydration, rest   Will f/u pending result  Relevant Orders    COVID Only- Lab Collect      Disposition:     Referred patient to centralized site to test for COVID-19  Discussed symptom directed medication options with patient  Discussed vitamin D, vitamin C, and/or zinc supplementation with patient  I have spent a total time of 15 minutes on the day of the encounter for this patient including discussing diagnostic results, discussing prognosis, risks and benefits of treatment options, instructions for management, patient and family education, importance of treatment compliance, risk factor reductions, impressions, counseling/coordination of care, documenting in the medical record, reviewing/ordering tests, medicine, procedures and obtaining or reviewing history  Encounter provider: MARIZA Pretty     Provider located at: [address] [id number]     Recent Visits  No visits were found meeting these conditions  Showing recent visits within past 7 days and meeting all other requirements  Today's Visits  Date Type Provider Dept   03/28/23 Telemedicine Marilyn Sanchez, [address] [place] [address]   Showing today's visits and meeting all other requirements  Future Appointments  No visits were found meeting these conditions    Showing future appointments within next 150 days and meeting all other requirements     This virtual check-in was done via [employer] [address] and patient was informed that this is a secure, HIPAA-compliant platform  She agrees to proceed  Patient agrees to participate in a virtual check in via telephone or video visit instead of presenting to the office to address urgent/immediate medical needs  Patient is aware this is a billable service  She acknowledged consent and understanding of privacy and security of the video platform  The patient has agreed to participate and understands they can discontinue the visit at any time  After connecting through [facility], the patient was identified by name and date of birth  Indra Guadalupe was informed that this was a telemedicine visit and that the exam was being conducted confidentially over secure lines  My office door was closed  The patient was notified the following individuals were present in the room: Dr Kisha Sutherland  Indra Guadalupe acknowledged consent and understanding of privacy and security of the telemedicine visit  I informed the patient that I have reviewed her record in Epic and presented the opportunity for her to ask any questions regarding the visit today  The patient agreed to participate  Verification of patient location:  Patient is located in the following state in which I hold an active license: [place]    Subjective: Indra Guadalupe is a 32 y o  female who is concerned about COVID-19  Patient's symptoms include fatigue, nasal congestion, cough and headache  Patient denies fever (tmax 99 4), chills, malaise, rhinorrhea, sore throat, anosmia, loss of taste, shortness of breath, chest tightness, abdominal pain, nausea, vomiting, diarrhea and myalgias       - Date of symptom onset: 3/27/2023      COVID-19 vaccination status: Fully vaccinated with booster    Exposure:   Contact with a person who is under investigation (PUI) for or who is positive for COVID-19 within the last 14 days?: Yes    Hospitalized recently for fever and/or lower respiratory symptoms?: No      Currently a healthcare worker that is involved in direct patient care?: No      Works in a special setting where the risk of COVID-19 transmission may be high? (this may include long-term care, correctional and long-term facilities; homeless shelters; assisted-living facilities and group homes ): No      Resident in a special setting where the risk of COVID-19 transmission may be high? (this may include long-term care, correctional and long-term facilities; homeless shelters; assisted-living facilities and group homes ): No      Lab Results   Component Value Date    SARSCOV2 Positive (A) 07/27/2022    SARSCOV2 NOT DETECTED 07/06/2021    SARSCORONAVI Detected (A) 12/11/2020       Review of Systems   Constitutional: Positive for fatigue  Negative for appetite change, chills and fever (tmax 99 4)  HENT: Positive for congestion  Negative for rhinorrhea and sore throat  Respiratory: Positive for cough  Negative for chest tightness and shortness of breath  Gastrointestinal: Negative for abdominal pain, diarrhea, nausea and vomiting  Musculoskeletal: Negative for myalgias  Neurological: Positive for headaches  Current Outpatient Medications on File Prior to Visit   Medication Sig   • albuterol (Ventolin HFA) 90 mcg/act inhaler Inhale 2 puffs every 6 (six) hours as needed for wheezing   • clindamycin-benzoyl peroxide (BENZACLIN) gel Apply to face twice daily for acne (Patient taking differently: Apply to face prn for acne)   • hydrocortisone 2 5 % ointment Apply topically 2 (two) times a day (Patient taking differently: Apply topically 2 (two) times a day as needed)   • loratadine (CLARITIN) 10 mg tablet Take 1 tablet (10 mg total) by mouth in the morning  (Patient taking differently: Take 10 mg by mouth daily as needed)   • NuvaRing 0 12-0 015 MG/24HR vaginal ring Insert vaginally and leave in place for 3 consecutive weeks, then remove for 1 week     • pimecrolimus (ELIDEL) 1 % cream Apply topically 2 (two) times a day To upper eyelids (Patient taking differently: Apply topically 2 (two) times a day as needed To upper eyelids)   • [START ON 3/29/2023] Semaglutide-Weight Management (WEGOVY) 1 MG/0 5ML Inject 0 5 mL (1 mg total) under the skin once a week Do not start before March 29, 2023  Objective: There were no vitals taken for this visit  Physical Exam  Constitutional:       General: She is awake  She is not in acute distress  Appearance: Normal appearance  She is well-developed and well-groomed  She is not ill-appearing  Eyes:      General: Lids are normal       Conjunctiva/sclera: Conjunctivae normal    Pulmonary:      Effort: Pulmonary effort is normal  No tachypnea, accessory muscle usage or respiratory distress  Neurological:      Mental Status: She is alert and oriented to person, place, and time  Psychiatric:         Attention and Perception: Attention normal          Mood and Affect: Mood normal          Speech: Speech normal          Behavior: Behavior normal  Behavior is cooperative  Thought Content:  Thought content normal          Cognition and Memory: Cognition normal          Judgment: Judgment normal        MARIZA Hayes

## 2023-03-28 NOTE — ASSESSMENT & PLAN NOTE
Onset 3/27 with scratchy throat that is now resolved, tmax 99 4, headache, sinus pressure, fatigue, cough, post-nasal drip, hoarseness  Denies body aches, GI symptoms  Home test negative yesterday, she is taking acetaminophen cold/flu prn  Would like to retest with covid pcr  Continue taking vitamins, encourage hydration, rest   Will f/u pending result

## 2023-03-29 LAB — SARS-COV-2 RNA RESP QL NAA+PROBE: NEGATIVE

## 2023-04-05 ENCOUNTER — OFFICE VISIT (OUTPATIENT)
Dept: FAMILY MEDICINE CLINIC | Facility: CLINIC | Age: 28
End: 2023-04-05

## 2023-04-05 VITALS
TEMPERATURE: 98.3 F | WEIGHT: 178.4 LBS | RESPIRATION RATE: 16 BRPM | BODY MASS INDEX: 30.46 KG/M2 | SYSTOLIC BLOOD PRESSURE: 102 MMHG | OXYGEN SATURATION: 99 % | HEIGHT: 64 IN | HEART RATE: 72 BPM | DIASTOLIC BLOOD PRESSURE: 78 MMHG

## 2023-04-05 DIAGNOSIS — K91.2 POSTSURGICAL MALABSORPTION: Chronic | ICD-10-CM

## 2023-04-05 DIAGNOSIS — Z11.1 ENCOUNTER FOR PPD TEST: ICD-10-CM

## 2023-04-05 DIAGNOSIS — Z98.84 BARIATRIC SURGERY STATUS: Chronic | ICD-10-CM

## 2023-04-05 DIAGNOSIS — Z00.00 ENCOUNTER FOR ANNUAL PHYSICAL EXAM: Primary | ICD-10-CM

## 2023-04-05 DIAGNOSIS — Z13.6 SCREENING FOR CARDIOVASCULAR CONDITION: ICD-10-CM

## 2023-04-05 NOTE — PROGRESS NOTES
[address]    NAME: Ofelia Peres  AGE: 32 y o  SEX: female  : 1995     DATE: 2023     Assessment and Plan:     Problem List Items Addressed This Visit        Digestive    Postsurgical malabsorption (Chronic)       Other    Bariatric surgery status (Chronic)    Relevant Orders    Vitamin A    Vitamin B12    Iron Panel (Includes Ferritin, Iron Sat%, Iron, and TIBC)    Vitamin D 25 hydroxy    Folate    CBC and differential   Other Visit Diagnoses     Encounter for annual physical exam    -  Primary    Healthy female here for annual physical  UTD for age appropriate screening  BW ordered  TB screening performed  F/u in 1 year    Encounter for PPD test        Requested by her employer  Will return in 48 hours to read     Relevant Orders    TB Skin Test (Completed)    Screening for cardiovascular condition        FBW ordered     Relevant Orders    Lipid panel          Immunizations and preventive care screenings were discussed with patient today  Appropriate education was printed on patient's after visit summary  Counseling:  Dental Health: discussed importance of regular tooth brushing, flossing, and dental visits  Injury prevention: discussed safety/seat belts, safety helmets, smoke detectors, carbon dioxide detectors, and smoking near bedding or upholstery  Exercise: the importance of regular exercise/physical activity was discussed  Recommend exercise 3-5 times per week for at least 30 minutes  No follow-ups on file  Chief Complaint:     Chief Complaint   Patient presents with   • Physical Exam     Patient is here for her annual wellness        History of Present Illness:     Adult Annual Physical   Patient here for a comprehensive physical exam  The patient reports no problems    Losing weight on wegovy   Needs TB screening for new employment     Diet and Physical Activity  Diet/Nutrition: well balanced diet  On wegovy   Exercise: walking  Depression Screening  PHQ-2/9 Depression Screening         General Health  Sleep: gets 7-8 hours of sleep on average  Varies  Hearing: normal - bilateral   Vision: wears glasses for driving and was told she had 20/15 vision  Dental: regular dental visits  /GYN Health  Last menstrual period: 28 days ago   Contraceptive method: nuvaring    History of STDs?: no      Review of Systems:     Review of Systems   Past Medical History:     Past Medical History:   Diagnosis Date   • Abnormal Pap smear of cervix     2018/2019 - HSIL/CIN2-3   • Acne    • Asthma     exercise induced asthma/no recent problems   • Bariatric surgery status    • Chronic thoracic back pain 11/9/2017   • Eczema    • Exercises 1 to 2 times per week     occas 3x/week   • External hemorrhoids     last assessed 8/15/12; resolved 6/13/14   • Fatigue     last assessed 4/8/13; resolved 6/13/14   • Hospital discharge follow-up 8/19/2019   • HPV (human papilloma virus) infection     2019 (+) HRHPV type 16/18 negative   • Obesity    • PCOS (polycystic ovarian syndrome)    • Postsurgical malabsorption       Past Surgical History:     Past Surgical History:   Procedure Laterality Date   • EGD     • FL BREAST REDUCTION Bilateral 2/22/2018    Procedure: BREAST REDUCTION;  Surgeon: Gabriel Gutierrez MD;  Location: [facility];  Service: Plastics   • FL LAPS [address] [facility] [address] [facility] LONGITUDINAL GASTRECTOMY N/A 8/13/2019    Procedure: GASTRECTOMY LAPAROSCOPIC SLEEVE WITH ROBOTICS;  Surgeon: London Santana MD;  Location: [facility];  Service: Bariatrics   • REDUCTION MAMMAPLASTY     • WISDOM TOOTH EXTRACTION        Social History:     Social History     Socioeconomic History   • Marital status: Single     Spouse name: None   • Number of children: None   • Years of education: None   • Highest education level: None   Occupational History   • None   Tobacco Use   • Smoking status: Never   • Smokeless tobacco: Never   Vaping Use   • Vaping Use: Never used   Substance and Sexual Activity   • Alcohol use: Yes     Comment: social   • Drug use: No   • Sexual activity: Yes     Partners: Male     Birth control/protection: Ring   Other Topics Concern   • None   Social History Narrative   • None     Social Determinants of Health     Financial Resource Strain: Not on file   Food Insecurity: Not on file   Transportation Needs: Not on file   Physical Activity: Not on file   Stress: Not on file   Social Connections: Not on file   Intimate Partner Violence: Not on file   Housing Stability: Not on file      Family History:     Family History   Problem Relation Age of Onset   • Clotting disorder Mother         with menstration   • Diabetes Father    • Breast cancer Maternal Aunt    • Cancer Maternal Aunt         breast cancer   • Hypertension Neg Hx    • Heart disease Neg Hx    • Thyroid disease Neg Hx    • Stroke Neg Hx       Current Medications:     Current Outpatient Medications   Medication Sig Dispense Refill   • albuterol (Ventolin HFA) 90 mcg/act inhaler Inhale 2 puffs every 6 (six) hours as needed for wheezing 1 Inhaler 1   • clindamycin-benzoyl peroxide (BENZACLIN) gel Apply to face twice daily for acne (Patient taking differently: Apply to face prn for acne) 25 g 1   • hydrocortisone 2 5 % ointment Apply topically 2 (two) times a day (Patient taking differently: Apply topically 2 (two) times a day as needed) 30 g 2   • loratadine (CLARITIN) 10 mg tablet Take 1 tablet (10 mg total) by mouth in the morning  (Patient taking differently: Take 10 mg by mouth daily as needed) 30 tablet 3   • NuvaRing 0 12-0 015 MG/24HR vaginal ring Insert vaginally and leave in place for 3 consecutive weeks, then remove for 1 week   1 each 11   • pimecrolimus (ELIDEL) 1 % cream Apply topically 2 (two) times a day To upper eyelids (Patient taking differently: Apply topically 2 (two) times a day as needed To upper eyelids) 30 g 3   • Semaglutide-Weight Management (WEGOVY) 1 "MG/0 5ML Inject 0 5 mL (1 mg total) under the skin once a week Do not start before March 29, 2023  2 mL 0     No current facility-administered medications for this visit  Allergies:     No Known Allergies   Physical Exam:     /78   Pulse 72   Temp 98 3 °F (36 8 °C)   Resp 16   Ht 5' 3 78\" (1 62 m)   Wt 80 9 kg (178 lb 6 4 oz)   SpO2 99%   BMI 30 83 kg/m²     Physical Exam  Vitals reviewed  Constitutional:       General: She is not in acute distress  Appearance: Normal appearance  She is not ill-appearing  HENT:      Head: Normocephalic and atraumatic  Right Ear: Tympanic membrane normal       Left Ear: Tympanic membrane normal       Mouth/Throat:      Mouth: Mucous membranes are moist    Eyes:      Extraocular Movements: Extraocular movements intact  Cardiovascular:      Rate and Rhythm: Normal rate and regular rhythm  Heart sounds: No murmur heard  Pulmonary:      Effort: Pulmonary effort is normal  No respiratory distress  Breath sounds: No stridor  No wheezing or rales  Abdominal:      General: Abdomen is flat  There is no distension  Palpations: There is no mass  Tenderness: There is no abdominal tenderness  There is no guarding  Hernia: No hernia is present  Musculoskeletal:      Right lower leg: No edema  Left lower leg: No edema  Lymphadenopathy:      Cervical: No cervical adenopathy  Skin:     General: Skin is warm  Neurological:      Mental Status: She is alert and oriented to person, place, and time  Psychiatric:         Mood and Affect: Mood normal          Behavior: Behavior normal          Thought Content:  Thought content normal           Raina Hsu MD   [address] [facility]  "

## 2023-04-07 ENCOUNTER — CLINICAL SUPPORT (OUTPATIENT)
Dept: FAMILY MEDICINE CLINIC | Facility: CLINIC | Age: 28
End: 2023-04-07

## 2023-04-07 DIAGNOSIS — Z11.1 ENCOUNTER FOR PPD SKIN TEST READING: Primary | ICD-10-CM

## 2023-04-07 LAB
INDURATION: 0 MM
TB SKIN TEST: NEGATIVE

## 2023-04-07 NOTE — PROGRESS NOTES
Name: Griselda Simon      : 1995      MRN: [id number]  Encounter Provider: [facility]  Encounter Date: 2023   Encounter department: [place] [address]  Encounter for PPD skin test reading           Subjective          Current Outpatient Medications on File Prior to Visit   Medication Sig   • albuterol (Ventolin HFA) 90 mcg/act inhaler Inhale 2 puffs every 6 (six) hours as needed for wheezing   • clindamycin-benzoyl peroxide (BENZACLIN) gel Apply to face twice daily for acne (Patient taking differently: Apply to face prn for acne)   • hydrocortisone 2 5 % ointment Apply topically 2 (two) times a day (Patient taking differently: Apply topically 2 (two) times a day as needed)   • loratadine (CLARITIN) 10 mg tablet Take 1 tablet (10 mg total) by mouth in the morning  (Patient taking differently: Take 10 mg by mouth daily as needed)   • NuvaRing 0 12-0 015 MG/24HR vaginal ring Insert vaginally and leave in place for 3 consecutive weeks, then remove for 1 week  • pimecrolimus (ELIDEL) 1 % cream Apply topically 2 (two) times a day To upper eyelids (Patient taking differently: Apply topically 2 (two) times a day as needed To upper eyelids)   • Semaglutide-Weight Management (WEGOVY) 1 MG/0 5ML Inject 0 5 mL (1 mg total) under the skin once a week Do not start before 2023  Objective     There were no vitals taken for this visit    [facility]

## 2023-05-08 DIAGNOSIS — E66.9 OBESITY, CLASS I, BMI 30-34.9: Primary | ICD-10-CM

## 2023-05-10 ENCOUNTER — TELEPHONE (OUTPATIENT)
Dept: BARIATRICS | Facility: CLINIC | Age: 28
End: 2023-05-10

## 2023-05-10 NOTE — TELEPHONE ENCOUNTER
Patient and pharm was informed regarding the [facility] approval from 05/09/2023-10/04/2023  She was also informed about the 4-5% weight loss requirement for renewal purposes

## 2023-05-25 ENCOUNTER — OFFICE VISIT (OUTPATIENT)
Dept: BARIATRICS | Facility: CLINIC | Age: 28
End: 2023-05-25

## 2023-05-25 VITALS
SYSTOLIC BLOOD PRESSURE: 110 MMHG | BODY MASS INDEX: 30.33 KG/M2 | RESPIRATION RATE: 16 BRPM | WEIGHT: 171.2 LBS | DIASTOLIC BLOOD PRESSURE: 70 MMHG | HEART RATE: 81 BPM | HEIGHT: 63 IN

## 2023-05-25 DIAGNOSIS — E66.9 OBESITY, CLASS I, BMI 30-34.9: Primary | ICD-10-CM

## 2023-05-25 DIAGNOSIS — E28.2 PCOS (POLYCYSTIC OVARIAN SYNDROME): ICD-10-CM

## 2023-05-25 DIAGNOSIS — K91.2 POSTSURGICAL MALABSORPTION: Chronic | ICD-10-CM

## 2023-05-25 NOTE — PROGRESS NOTES
Assessment/Plan:    Obesity, Class I, BMI 30-34 9  -Patient is pursuing Conservative Program s/p Vertical Sowmya Bravo 8/13/2019    -Initial weight loss goal of 5-10% weight loss for improved health  -Screening labs 5/21/22    Initial:186  Current:171 2  Change-14 8lb      Goals:  -continue to concentrate on protein goal and making sure to eat vegetables regularly    -continue to try to drink at least 64oz of water daily   -Continue walking regularly for exercise   Recommend 3 days a week at least 30 minutes    Medications  -to continue wegovy on 1 7mg currently and will continue this dose next month as well due to nausea  10% weight loss currently since start at weight of 190 2 on 1/13/23  Patient denies personal and family history of MCT and MEN2 tumors  Patient denies personal history of pancreatitis  Side effects discussed but not limited to diarrhea, bloating, constipation, GI upset, heartburn, increased heart rate, headache, low blood sugar, fatigue and dizziness  Titration and medication administration discussed     -was on topamax and phentermine prior and did not lose appropriate weight  -was on metformin for PCOS and did not help with weight loss    PCOS (polycystic ovarian syndrome)  Last glucose, insulin level wnl  Was on metformin prior and did have GI side effects        Follow up in approximately 3 months with Non-Surgical Physician/Advanced Practitioner  Diagnoses and all orders for this visit:    Obesity, Class I, BMI 30-34 9    PCOS (polycystic ovarian syndrome)    Postsurgical malabsorption          Subjective:   Chief Complaint   Patient presents with   • Follow-up     MWM 2/3 mth f/u; Waist;31 5in        Patient ID: Colin Brady  is a 32 y o  female with excess weight/obesity here to pursue weight managment  Patient is pursuing Conservative Program      HPI s/p Vertical Sowmya Bravo 8/13/2019  Here for MWM followup   On wegovy 1 7mg currently  She is starting to feel more nausea on the 1 7mg dose  She tries to combat it by drinking water and is making sure to eat regularly  Eating protein first and sometimes vegetable  Wt Readings from Last 10 Encounters:   05/25/23 77 7 kg (171 lb 3 2 oz)   04/05/23 80 9 kg (178 lb 6 4 oz)   03/15/23 82 7 kg (182 lb 6 4 oz)   01/18/23 86 5 kg (190 lb 9 6 oz)   01/13/23 86 3 kg (190 lb 3 2 oz)   09/08/22 85 1 kg (187 lb 9 6 oz)   07/27/22 83 9 kg (185 lb)   06/28/22 83 9 kg (185 lb)   05/23/22 83 6 kg (184 lb 6 4 oz)   05/20/22 83 7 kg (184 lb 8 oz)       Food logging:no  Increased appetite/cravings:decreased  Fruit/Vegetable servings:  Exercise: walking 2-3 days a week 30 minutes  Hydration:60 oz water daily, 1 alcoholic drink a week    Colonoscopy-Not applicable    The following portions of the patient's history were reviewed and updated as appropriate:   She  has a past medical history of Abnormal Pap smear of cervix, Acne, Asthma, Bariatric surgery status, Chronic thoracic back pain (11/9/2017), Eczema, Exercises 1 to 2 times per week, External hemorrhoids, Fatigue, Hospital discharge follow-up (8/19/2019), HPV (human papilloma virus) infection, Obesity, PCOS (polycystic ovarian syndrome), and Postsurgical malabsorption    She   Patient Active Problem List    Diagnosis Date Noted   • Viral infection, unspecified 07/27/2022   • Acute nonintractable headache 05/12/2022   • History of COVID-19 01/06/2021   • Menorrhagia with regular cycle 09/29/2020   • Obesity, Class I, BMI 30-34 9 09/16/2020   • Extrinsic asthma without complication 82/72/9085   • Encounter for surgical aftercare following surgery of digestive system 03/16/2020   • Low vitamin B12 level 03/16/2020   • High blood copper level 03/16/2020   • Postsurgical malabsorption 11/25/2019   • Need for vaccination 10/22/2019   • Encounter for gynecological examination (general) (routine) without abnormal findings 09/17/2019   • Bariatric surgery status 09/10/2019   • PCOS (polycystic ovarian syndrome) 07/17/2019   • Annual physical exam 04/25/2019   • Moderate dysplasia of cervix (SALTY II) 01/31/2019   • Hyperinsulinemia 12/19/2018   • Acne vulgaris 04/17/2018   • Hemorrhoids, external 04/21/2017     She  has a past surgical history that includes pr breast reduction (Bilateral, 2/22/2018); Gary tooth extraction; Reduction mammaplasty; EGD; and pr laps gstrc rstrictiv px longitudinal gastrectomy (N/A, 8/13/2019)  Her family history includes Breast cancer in her maternal aunt; Cancer in her maternal aunt; Clotting disorder in her mother; Diabetes in her father  She  reports that she has never smoked  She has never used smokeless tobacco  She reports current alcohol use  She reports that she does not use drugs  Current Outpatient Medications   Medication Sig Dispense Refill   • albuterol (Ventolin HFA) 90 mcg/act inhaler Inhale 2 puffs every 6 (six) hours as needed for wheezing 1 Inhaler 1   • clindamycin-benzoyl peroxide (BENZACLIN) gel Apply to face twice daily for acne (Patient taking differently: Apply to face prn for acne) 25 g 1   • hydrocortisone 2 5 % ointment Apply topically 2 (two) times a day (Patient taking differently: Apply topically 2 (two) times a day as needed) 30 g 2   • loratadine (CLARITIN) 10 mg tablet Take 1 tablet (10 mg total) by mouth in the morning  (Patient taking differently: Take 10 mg by mouth daily as needed) 30 tablet 3   • NuvaRing 0 12-0 015 MG/24HR vaginal ring Insert vaginally and leave in place for 3 consecutive weeks, then remove for 1 week   1 each 11   • Semaglutide-Weight Management (WEGOVY) 1 7 MG/0 75ML Inject 0 75 mL (1 7 mg total) under the skin once a week for 28 days 3 mL 1   • pimecrolimus (ELIDEL) 1 % cream Apply topically 2 (two) times a day To upper eyelids (Patient taking differently: Apply topically 2 (two) times a day as needed To upper eyelids) 30 g 3     No current facility-administered medications for "this visit  Current Outpatient Medications on File Prior to Visit   Medication Sig   • albuterol (Ventolin HFA) 90 mcg/act inhaler Inhale 2 puffs every 6 (six) hours as needed for wheezing   • clindamycin-benzoyl peroxide (BENZACLIN) gel Apply to face twice daily for acne (Patient taking differently: Apply to face prn for acne)   • hydrocortisone 2 5 % ointment Apply topically 2 (two) times a day (Patient taking differently: Apply topically 2 (two) times a day as needed)   • loratadine (CLARITIN) 10 mg tablet Take 1 tablet (10 mg total) by mouth in the morning  (Patient taking differently: Take 10 mg by mouth daily as needed)   • NuvaRing 0 12-0 015 MG/24HR vaginal ring Insert vaginally and leave in place for 3 consecutive weeks, then remove for 1 week  • Semaglutide-Weight Management (WEGOVY) 1 7 MG/0 75ML Inject 0 75 mL (1 7 mg total) under the skin once a week for 28 days   • pimecrolimus (ELIDEL) 1 % cream Apply topically 2 (two) times a day To upper eyelids (Patient taking differently: Apply topically 2 (two) times a day as needed To upper eyelids)     No current facility-administered medications on file prior to visit  She has No Known Allergies       Review of Systems   Constitutional: Negative for chills and fever  Eyes: Negative for pain and visual disturbance  Respiratory: Negative for cough and shortness of breath  Cardiovascular: Negative for chest pain and palpitations  Gastrointestinal: Positive for nausea  Negative for abdominal pain and vomiting  Genitourinary: Negative for dysuria and hematuria  Musculoskeletal: Negative for arthralgias and back pain  Skin: Positive for rash (left leg-just began  not itchy or painful  )  Negative for color change  Neurological: Negative for seizures and syncope  All other systems reviewed and are negative        Objective:    /70   Pulse 81   Resp 16   Ht 5' 3\" (1 6 m)   Wt 77 7 kg (171 lb 3 2 oz)   BMI 30 33 kg/m²      " Physical Exam  Vitals and nursing note reviewed  Constitutional:       General: She is not in acute distress  Appearance: She is well-developed  She is obese  HENT:      Head: Normocephalic and atraumatic  Eyes:      Conjunctiva/sclera: Conjunctivae normal    Neck:      Thyroid: No thyromegaly  Pulmonary:      Effort: Pulmonary effort is normal  No respiratory distress  Skin:     Findings: Rash (left thigh patchy irregular shaped rash) present  Neurological:      Mental Status: She is alert and oriented to person, place, and time     Psychiatric:         Mood and Affect: Mood normal          Behavior: Behavior normal

## 2023-05-25 NOTE — ASSESSMENT & PLAN NOTE
-Patient is pursuing Conservative Program s/p Vertical Jamal Syed 8/13/2019    -Initial weight loss goal of 5-10% weight loss for improved health  -Screening labs 5/21/22    Initial:186  Current:171 2  Change-14 8lb      Goals:  -continue to concentrate on protein goal and making sure to eat vegetables regularly    -continue to try to drink at least 64oz of water daily   -Continue walking regularly for exercise   Recommend 3 days a week at least 30 minutes    Medications  -to continue wegovy on 1 7mg currently and will continue this dose next month as well due to nausea  10% weight loss currently since start at weight of 190 2 on 1/13/23  Patient denies personal and family history of MCT and MEN2 tumors  Patient denies personal history of pancreatitis  Side effects discussed but not limited to diarrhea, bloating, constipation, GI upset, heartburn, increased heart rate, headache, low blood sugar, fatigue and dizziness   Titration and medication administration discussed     -was on topamax and phentermine prior and did not lose appropriate weight  -was on metformin for PCOS and did not help with weight loss

## 2023-07-03 ENCOUNTER — TELEPHONE (OUTPATIENT)
Dept: GYNECOLOGIC ONCOLOGY | Facility: CLINIC | Age: 28
End: 2023-07-03

## 2023-07-03 NOTE — TELEPHONE ENCOUNTER
Called and spoke to patient to reschedule her 7/11/23 appointment with Josh Jeffery. Sheron Opitz is unavailable that day so her new appointment is on 07/17/23 at 8:30 am in [facility]. Patient was agreeable.

## 2023-07-05 DIAGNOSIS — E66.9 OBESITY, CLASS I, BMI 30-34.9: ICD-10-CM

## 2023-07-05 DIAGNOSIS — E28.2 PCOS (POLYCYSTIC OVARIAN SYNDROME): Primary | ICD-10-CM

## 2023-07-06 ENCOUNTER — TELEPHONE (OUTPATIENT)
Dept: BARIATRICS | Facility: CLINIC | Age: 28
End: 2023-07-06

## 2023-07-06 NOTE — TELEPHONE ENCOUNTER
Patient and pharm was informed regarding the [facility] approval from 07/05/2023-10/04/2023. She was also informed about the 4-5% weight loss requirement for renewal purposes.

## 2023-07-13 ENCOUNTER — TELEPHONE (OUTPATIENT)
Dept: HEMATOLOGY ONCOLOGY | Facility: CLINIC | Age: 28
End: 2023-07-13

## 2023-07-13 NOTE — TELEPHONE ENCOUNTER
Appointment Change  Cancel, Reschedule, Change to Virtual      Who are you speaking with? Patient   If it is not the patient, are they listed on an active communication consent form? N/A   Which provider is the appointment scheduled with? Dr. Julio Winn and Bill Callahan PA-C   When is the appointment scheduled? Please list date and time  07/17/23 8:30AM Bill Callahan   [place] [facility] [place]   Was the appointment rescheduled or changed from an in person visit to a virtual visit? If so, please list the details of the change. 08/08/23 3:30PM Dr. Julio Winn   What is the reason for the appointment change? Provider had schedule change. Patient requested to be rescheduled with Dr. Julio Winn   Was [facility]AR transport scheduled for this visit? No   Does STAR transport need to be scheduled for the new visit (if applicable) N/A   Does the patient need an infusion appointment rescheduled? No   Does the patient have an infusion appointment scheduled? If so, when? No   Is the patient undergoing chemotherapy? No   Was the no-show policy reviewed for appointments being changed with less then 24 hours of notice?  N/A

## 2023-07-18 ENCOUNTER — TELEMEDICINE (OUTPATIENT)
Dept: FAMILY MEDICINE CLINIC | Facility: CLINIC | Age: 28
End: 2023-07-18
Payer: COMMERCIAL

## 2023-07-18 DIAGNOSIS — J06.9 UPPER RESPIRATORY TRACT INFECTION, UNSPECIFIED TYPE: Primary | ICD-10-CM

## 2023-07-18 PROCEDURE — 99213 OFFICE O/P EST LOW 20 MIN: CPT | Performed by: NURSE PRACTITIONER

## 2023-07-18 NOTE — PROGRESS NOTES
Assessment/Plan:    No problem-specific Assessment & Plan notes found for this encounter  Diagnoses and all orders for this visit:    PPD screening test  -     TB Skin Test          Subjective:      Patient ID: Xuan Campbell is a 30 y o  female  HPI        Review of Systems      Objective: There were no vitals taken for this visit           Physical Exam
19

## 2023-07-18 NOTE — PROGRESS NOTES
COVID-19 Outpatient Progress Note    Assessment/Plan:    Problem List Items Addressed This Visit        Respiratory    Upper respiratory tract infection - Primary     Onset 7/16, scratchy throat, post-nasal drip, headache, congestion, watery eyes, fatigue. Did not check temp, denies body aches, sob, GI symptoms. Pt advised to take covid test at home and call if positive. Symptoms most suggestive of allergies or possibly viral illness. Continue sudafed prn, recommend starting flonase, claritin. Pt instructed to call for reevaluation if sx worsen or persist.             Disposition:     Discussed symptom directed medication options with patient. Pt to take home test for covid    I have spent a total time of 15 minutes on the day of the encounter for this patient including discussing prognosis, risks and benefits of treatment options, instructions for management, patient and family education, importance of treatment compliance, risk factor reductions, impressions, counseling/coordination of care, documenting in the medical record and obtaining or reviewing history. Encounter provider: MARIZA Cummings     Provider located at: [address] [place] [id number]     Recent Visits  No visits were found meeting these conditions. Showing recent visits within past 7 days and meeting all other requirements  Today's Visits  Date Type Provider Dept   07/18/23 Telemedicine Marilyn Ny Brenda, [address] [facility]   Showing today's visits and meeting all other requirements  Future Appointments  No visits were found meeting these conditions. Showing future appointments within next 150 days and meeting all other requirements     This virtual check-in was done via [address] [place] [address] and patient was informed that this is a secure, HIPAA-compliant platform. She agrees to proceed.     Patient agrees to participate in a virtual check in via telephone or video visit instead of presenting to the office to address urgent/immediate medical needs. Patient is aware this is a billable service. She acknowledged consent and understanding of privacy and security of the video platform. The patient has agreed to participate and understands they can discontinue the visit at any time. After connecting through [facility], the patient was identified by name and date of birth. Ana Payan was informed that this was a telemedicine visit and that the exam was being conducted confidentially over secure lines. My office door was closed. No one else was in the room. Ana Payan acknowledged consent and understanding of privacy and security of the telemedicine visit. I informed the patient that I have reviewed her record in Epic and presented the opportunity for her to ask any questions regarding the visit today. The patient agreed to participate. Verification of patient location:  Patient is located in the following state in which I hold an active license: [place]    Subjective: Ana Payan is a 32 y.o. female who is concerned about COVID-19. Patient's symptoms include fatigue, nasal congestion, cough and headache. Patient denies fever, chills, malaise, rhinorrhea, sore throat, anosmia, loss of taste, shortness of breath, chest tightness, abdominal pain, nausea, vomiting, diarrhea and myalgias.      - Date of symptom onset: 7/16/2023      COVID-19 vaccination status: Fully vaccinated with booster    Exposure:   Contact with a person who is under investigation (PUI) for or who is positive for COVID-19 within the last 14 days?: No    Hospitalized recently for fever and/or lower respiratory symptoms?: No      Currently a healthcare worker that is involved in direct patient care?: Yes      Works in a special setting where the risk of COVID-19 transmission may be high? (this may include long-term care, correctional and care home facilities; homeless shelters; assisted-living facilities and group homes.): No      Resident in a special setting where the risk of COVID-19 transmission may be high? (this may include long-term care, correctional and half-way facilities; homeless shelters; assisted-living facilities and group homes.): No      Lab Results   Component Value Date    SARSCOV2 Negative 03/28/2023    SARSCOV2 NOT DETECTED 07/06/2021    SARSCORONAVI Detected (A) 12/11/2020       Review of Systems   Constitutional: Positive for fatigue. Negative for chills and fever. HENT: Positive for congestion. Negative for rhinorrhea and sore throat. Respiratory: Positive for cough. Negative for chest tightness and shortness of breath. Gastrointestinal: Negative for abdominal pain, diarrhea, nausea and vomiting. Musculoskeletal: Negative for myalgias. Neurological: Positive for headaches. Current Outpatient Medications on File Prior to Visit   Medication Sig   • albuterol (Ventolin HFA) 90 mcg/act inhaler Inhale 2 puffs every 6 (six) hours as needed for wheezing   • clindamycin-benzoyl peroxide (BENZACLIN) gel Apply to face twice daily for acne (Patient taking differently: Apply to face prn for acne)   • hydrocortisone 2.5 % ointment Apply topically 2 (two) times a day (Patient taking differently: Apply topically 2 (two) times a day as needed)   • loratadine (CLARITIN) 10 mg tablet Take 1 tablet (10 mg total) by mouth in the morning. (Patient taking differently: Take 10 mg by mouth daily as needed)   • NuvaRing 0.12-0.015 MG/24HR vaginal ring Insert vaginally and leave in place for 3 consecutive weeks, then remove for 1 week. • pimecrolimus (ELIDEL) 1 % cream Apply topically 2 (two) times a day To upper eyelids (Patient taking differently: Apply topically 2 (two) times a day as needed To upper eyelids)   • Semaglutide-Weight Management (WEGOVY) 2.4 MG/0.75ML Inject 0.75 mL (2.4 mg total) under the skin once a week       Objective: There were no vitals taken for this visit. Physical Exam  Constitutional:       General: She is awake. She is not in acute distress. Appearance: Normal appearance. She is well-developed and well-groomed. She is not ill-appearing. Eyes:      General: Lids are normal.      Conjunctiva/sclera: Conjunctivae normal.   Pulmonary:      Effort: Pulmonary effort is normal. No tachypnea, accessory muscle usage or respiratory distress. Neurological:      Mental Status: She is alert and oriented to person, place, and time. Psychiatric:         Attention and Perception: Attention normal.         Mood and Affect: Mood normal.         Speech: Speech normal.         Behavior: Behavior normal. Behavior is cooperative. Thought Content:  Thought content normal.         Cognition and Memory: Cognition normal.         Judgment: Judgment normal.       Marchia Fabry, CRNP

## 2023-07-18 NOTE — ASSESSMENT & PLAN NOTE
Onset 7/16, scratchy throat, post-nasal drip, headache, congestion, watery eyes, fatigue. Did not check temp, denies body aches, sob, GI symptoms. Pt advised to take covid test at home and call if positive. Symptoms most suggestive of allergies or possibly viral illness. Continue sudafed prn, recommend starting flonase, claritin.   Pt instructed to call for reevaluation if sx worsen or persist.

## 2023-08-08 ENCOUNTER — OFFICE VISIT (OUTPATIENT)
Dept: GYNECOLOGIC ONCOLOGY | Facility: CLINIC | Age: 28
End: 2023-08-08
Payer: COMMERCIAL

## 2023-08-08 VITALS
TEMPERATURE: 97.4 F | WEIGHT: 164 LBS | HEIGHT: 63 IN | SYSTOLIC BLOOD PRESSURE: 100 MMHG | DIASTOLIC BLOOD PRESSURE: 80 MMHG | OXYGEN SATURATION: 98 % | BODY MASS INDEX: 29.06 KG/M2 | RESPIRATION RATE: 16 BRPM | HEART RATE: 69 BPM

## 2023-08-08 DIAGNOSIS — N93.9 ABNORMAL UTERINE BLEEDING (AUB): ICD-10-CM

## 2023-08-08 DIAGNOSIS — N87.1 MODERATE DYSPLASIA OF CERVIX (CIN II): Primary | ICD-10-CM

## 2023-08-08 PROCEDURE — 87624 HPV HI-RISK TYP POOLED RSLT: CPT | Performed by: OBSTETRICS & GYNECOLOGY

## 2023-08-08 PROCEDURE — 88175 CYTOPATH C/V AUTO FLUID REDO: CPT | Performed by: OBSTETRICS & GYNECOLOGY

## 2023-08-08 PROCEDURE — 99214 OFFICE O/P EST MOD 30 MIN: CPT | Performed by: OBSTETRICS & GYNECOLOGY

## 2023-08-09 NOTE — ASSESSMENT & PLAN NOTE
Heavy bleeding despite being on NuvaRing. Cycles are otherwise normal.  Will assess transvaginal and pelvic ultrasound. We also discussed not having a cycle. She understands that with continuous use of the NuvaRing, she may have some irregular spotting/bleeding.

## 2023-08-09 NOTE — PROGRESS NOTES
Assessment/Plan:    Problem List Items Addressed This Visit        Genitourinary    Moderate dysplasia of cervix (SALTY II) - Primary     52-year-old with a history of SALTY-2 diagnosed in 2009. She has had negative Pap smears in June 2021 and in June 2022. Repeat Pap smear performed today. Her performance status is 0.  1. If this Pap smear is negative, she can return to normal screening. Relevant Orders    Liquid-based pap, diagnostic    Abnormal uterine bleeding (AUB)     Heavy bleeding despite being on NuvaRing. Cycles are otherwise normal.  Will assess transvaginal and pelvic ultrasound. We also discussed not having a cycle. She understands that with continuous use of the NuvaRing, she may have some irregular spotting/bleeding. Relevant Orders    US pelvis complete w transvaginal         CHIEF COMPLAINT: Here for Pap smear, heavier bleeding between NuvaRing insertions          Patient ID: Dora Oropeza is a 32 y.o. female  Who returns for follow-up Pap smear. Last 2 annual Paps were within normal limits, HPV negative. She notes new onset heavier bleeding particularly on the second day of her cycle where she is passing some clots. This is new for her. She has been using the NuvaRing and is on time with exchanges. No bleeding while the NuvaRing is in. She has lost weight intentionally. Overall, quality of life is good. She is able to perform her activities of daily without difficulty. The following portions of the patient's history were reviewed and updated as appropriate: allergies, current medications, past family history, past medical history, past social history, past surgical history and problem list.    Review of Systems   Constitutional: Negative for activity change and unexpected weight change. HENT: Negative. Eyes: Negative. Respiratory: Negative. Cardiovascular: Negative. Gastrointestinal: Negative for abdominal distention and abdominal pain. Endocrine: Negative. Genitourinary: Positive for menstrual problem. Negative for pelvic pain and vaginal bleeding. Musculoskeletal: Negative. Skin: Negative. Allergic/Immunologic: Negative. Neurological: Negative. Hematological: Negative. Psychiatric/Behavioral: Negative. Current Outpatient Medications   Medication Sig Dispense Refill   • albuterol (Ventolin HFA) 90 mcg/act inhaler Inhale 2 puffs every 6 (six) hours as needed for wheezing 1 Inhaler 1   • clindamycin-benzoyl peroxide (BENZACLIN) gel Apply to face twice daily for acne (Patient taking differently: Apply to face prn for acne) 25 g 1   • hydrocortisone 2.5 % ointment Apply topically 2 (two) times a day (Patient taking differently: Apply topically 2 (two) times a day as needed) 30 g 2   • loratadine (CLARITIN) 10 mg tablet Take 1 tablet (10 mg total) by mouth in the morning. (Patient taking differently: Take 10 mg by mouth daily as needed) 30 tablet 3   • NuvaRing 0.12-0.015 MG/24HR vaginal ring Insert vaginally and leave in place for 3 consecutive weeks, then remove for 1 week. 1 each 11   • pimecrolimus (ELIDEL) 1 % cream Apply topically 2 (two) times a day To upper eyelids (Patient taking differently: Apply topically 2 (two) times a day as needed To upper eyelids) 30 g 3     No current facility-administered medications for this visit. Objective:    Blood pressure 100/80, pulse 69, temperature (!) 97.4 °F (36.3 °C), temperature source Temporal, resp. rate 16, height 5' 3" (1.6 m), weight 74.4 kg (164 lb), SpO2 98 %. Body mass index is 29.05 kg/m². Body surface area is 1.78 meters squared. Physical Exam  Vitals reviewed. Constitutional:       General: She is not in acute distress. Appearance: Normal appearance. She is not ill-appearing. HENT:      Head: Normocephalic and atraumatic. Mouth/Throat:      Mouth: Mucous membranes are moist.   Eyes:      General: No scleral icterus.         Right eye: No discharge. Left eye: No discharge. Conjunctiva/sclera: Conjunctivae normal.   Pulmonary:      Effort: Pulmonary effort is normal.   Genitourinary:     Comments: External female genitalia normal.  There are no masses or lesions. Spec examination reveals a gross normal vagina and cervix. A Pap smear was obtained. Musculoskeletal:      Right lower leg: No edema. Left lower leg: No edema. Skin:     General: Skin is warm and dry. Coloration: Skin is not jaundiced. Findings: No rash. Neurological:      General: No focal deficit present. Mental Status: She is alert and oriented to person, place, and time. Cranial Nerves: No cranial nerve deficit. Motor: No weakness. Gait: Gait normal.   Psychiatric:         Mood and Affect: Mood normal.         Behavior: Behavior normal.         Thought Content:  Thought content normal.         Judgment: Judgment normal.         No results found for: ""  Lab Results   Component Value Date    K 4.0 05/21/2022     (H) 05/21/2022    CO2 26 05/21/2022    BUN 16 05/21/2022    CREATININE 0.73 05/21/2022    GLUF 74 05/21/2022    CALCIUM 8.7 05/21/2022    AST 14 05/21/2022    ALT 12 05/21/2022    ALKPHOS 55 05/21/2022    EGFR 112 05/21/2022     Lab Results   Component Value Date    WBC 6.64 05/21/2022    HGB 13.1 05/21/2022    HCT 39.9 05/21/2022    MCV 92 05/21/2022     05/21/2022     No results found for: "NEUTROABS"     Trend:  No results found for: ""

## 2023-08-09 NOTE — ASSESSMENT & PLAN NOTE
22-year-old with a history of SALTY-2 diagnosed in 2009. She has had negative Pap smears in June 2021 and in June 2022. Repeat Pap smear performed today. Her performance status is 0.  1. If this Pap smear is negative, she can return to normal screening.

## 2023-08-14 LAB
LAB AP GYN PRIMARY INTERPRETATION: NORMAL
Lab: NORMAL

## 2023-08-15 ENCOUNTER — HOSPITAL ENCOUNTER (OUTPATIENT)
Dept: ULTRASOUND IMAGING | Facility: HOSPITAL | Age: 28
Discharge: HOME | End: 2023-08-15
Attending: OBSTETRICS & GYNECOLOGY
Payer: COMMERCIAL

## 2023-08-15 DIAGNOSIS — N93.9 ABNORMAL UTERINE BLEEDING (AUB): ICD-10-CM

## 2023-08-15 PROCEDURE — 76856 US EXAM PELVIC COMPLETE: CPT

## 2023-08-15 PROCEDURE — 76830 TRANSVAGINAL US NON-OB: CPT

## 2023-09-07 ENCOUNTER — OFFICE VISIT (OUTPATIENT)
Dept: BARIATRICS | Facility: CLINIC | Age: 28
End: 2023-09-07
Payer: COMMERCIAL

## 2023-09-07 VITALS
HEART RATE: 96 BPM | SYSTOLIC BLOOD PRESSURE: 100 MMHG | BODY MASS INDEX: 28.81 KG/M2 | DIASTOLIC BLOOD PRESSURE: 76 MMHG | WEIGHT: 162.6 LBS | HEIGHT: 63 IN

## 2023-09-07 DIAGNOSIS — K91.2 POSTSURGICAL MALABSORPTION: Chronic | ICD-10-CM

## 2023-09-07 DIAGNOSIS — E28.2 PCOS (POLYCYSTIC OVARIAN SYNDROME): ICD-10-CM

## 2023-09-07 DIAGNOSIS — E66.3 OVERWEIGHT: ICD-10-CM

## 2023-09-07 DIAGNOSIS — E16.1 HYPERINSULINEMIA: Primary | ICD-10-CM

## 2023-09-07 PROCEDURE — 99214 OFFICE O/P EST MOD 30 MIN: CPT | Performed by: PHYSICIAN ASSISTANT

## 2023-09-07 RX ORDER — SEMAGLUTIDE 2.4 MG/.75ML
2.4 INJECTION, SOLUTION SUBCUTANEOUS WEEKLY
COMMUNITY
Start: 2023-08-17 | End: 2023-09-07 | Stop reason: SDUPTHER

## 2023-09-07 RX ORDER — SEMAGLUTIDE 2.4 MG/.75ML
2.4 INJECTION, SOLUTION SUBCUTANEOUS WEEKLY
Qty: 3 ML | Refills: 2 | Status: SHIPPED | OUTPATIENT
Start: 2023-09-07 | End: 2023-10-07

## 2023-09-07 NOTE — ASSESSMENT & PLAN NOTE
-Patient is pursuing Conservative Program s/p [facility] 8/13/2019.   -Initial weight loss goal of 5-10% weight loss for improved health  -Screening labs 5/21/22    Initial:186  Last Visit:171.2  Current:162.6  Change-14.8lb      Goals:  -continue to concentrate on protein goal and making sure to eat vegetables regularly.   -continue to try to drink at least 64oz of water daily.  -Continue walking regularly for exercise . Recommend 3 days a week at least 30 minutes    Medications  -to continue wegovy on 2.4mg. .   14.5% weight loss currently since start at weight of 190.2 on 1/13/23. Patient denies personal and family history of MCT and MEN2 tumors. Patient denies personal history of pancreatitis. Side effects discussed but not limited to diarrhea, bloating, constipation, GI upset, heartburn, increased heart rate, headache, low blood sugar, fatigue and dizziness.  Titration and medication administration discussed.    -was on topamax and phentermine prior and did not lose appropriate weight  -was on metformin for PCOS and did not help with weight loss

## 2023-09-07 NOTE — PROGRESS NOTES
Assessment/Plan:    Overweight  -Patient is pursuing Conservative Program s/p Vertical [facility] Dr. Joaquin Jaime 8/13/2019.   -Initial weight loss goal of 5-10% weight loss for improved health  -Screening labs 5/21/22    Initial:186  Last Visit:171.2  Current:162.6  Change-14.8lb      Goals:  -continue to concentrate on protein goal and making sure to eat vegetables regularly.   -continue to try to drink at least 64oz of water daily.  -Continue walking regularly for exercise . Recommend 3 days a week at least 30 minutes    Medications  -to continue wegovy on 2.4mg. .   14.5% weight loss currently since start at weight of 190.2 on 1/13/23. Patient denies personal and family history of MCT and MEN2 tumors. Patient denies personal history of pancreatitis. Side effects discussed but not limited to diarrhea, bloating, constipation, GI upset, heartburn, increased heart rate, headache, low blood sugar, fatigue and dizziness. Titration and medication administration discussed.    -was on topamax and phentermine prior and did not lose appropriate weight  -was on metformin for PCOS and did not help with weight loss    PCOS (polycystic ovarian syndrome)  To recheck labs. Continue on wegovy 2.4mg    Postsurgical malabsorption  Taking multivitamin. Has labs ordered by PCP        Follow up in approximately 4 months with Non-Surgical Physician/Advanced Practitioner. Diagnoses and all orders for this visit:    Hyperinsulinemia  -     Lipid panel; Future  -     Hemoglobin A1C; Future  -     Wegovy 2.4 MG/0.75ML; Inject 0.75 mL (2.4 mg total) under the skin once a week    PCOS (polycystic ovarian syndrome)  -     Wegovy 2.4 MG/0.75ML; Inject 0.75 mL (2.4 mg total) under the skin once a week    Overweight  -     Wegovy 2.4 MG/0.75ML; Inject 0.75 mL (2.4 mg total) under the skin once a week    Postsurgical malabsorption    Other orders  -     Discontinue: Wegovy 2.4 MG/0.75ML;  Inject 2.4 mg under the skin once a week          Subjective:   Chief Complaint   Patient presents with   • Follow-up     MWM 3mth f/u, waist 35in        Patient ID: Reji Ferreira  is a 32 y.o. female with excess weight/obesity here to pursue weight managment. Patient is pursuing Conservative Program.     HPI She is on wegovy 2.4 mg. In the last few weeks  she has had more reflux . She has noticed it with garlic. She does takes tums if it happening and it resolves. Wt Readings from Last 10 Encounters:   09/07/23 73.8 kg (162 lb 9.6 oz)   08/08/23 74.4 kg (164 lb)   05/25/23 77.7 kg (171 lb 3.2 oz)   04/05/23 80.9 kg (178 lb 6.4 oz)   03/15/23 82.7 kg (182 lb 6.4 oz)   01/18/23 86.5 kg (190 lb 9.6 oz)   01/13/23 86.3 kg (190 lb 3.2 oz)   09/08/22 85.1 kg (187 lb 9.6 oz)   07/27/22 83.9 kg (185 lb)   06/28/22 83.9 kg (185 lb)       Food logging: Increased appetite/cravings:  Fruit/Vegetable servings:  Exercise:walking  Hydration:50 pz day    Diet recall  B: granola bar  S:  L:protein, vegetable  S: pretzels  D:protein, vegetable    Colonoscopy-Not applicable    The following portions of the patient's history were reviewed and updated as appropriate:   She  has a past medical history of Abnormal Pap smear of cervix, Acne, Asthma, Bariatric surgery status, Chronic thoracic back pain (11/9/2017), Eczema, Exercises 1 to 2 times per week, External hemorrhoids, Fatigue, Hospital discharge follow-up (8/19/2019), HPV (human papilloma virus) infection, Obesity, PCOS (polycystic ovarian syndrome), and Postsurgical malabsorption.   She   Patient Active Problem List    Diagnosis Date Noted   • Abnormal uterine bleeding (AUB) 08/08/2023   • Upper respiratory tract infection 07/18/2023   • Viral infection, unspecified 07/27/2022   • Acute nonintractable headache 05/12/2022   • History of COVID-19 01/06/2021   • Menorrhagia with regular cycle 09/29/2020   • Overweight 09/16/2020   • Extrinsic asthma without complication 10/63/0559   • Encounter for surgical aftercare following surgery of digestive system 03/16/2020   • Low vitamin B12 level 03/16/2020   • High blood copper level 03/16/2020   • Postsurgical malabsorption 11/25/2019   • Need for vaccination 10/22/2019   • Encounter for gynecological examination (general) (routine) without abnormal findings 09/17/2019   • Bariatric surgery status 09/10/2019   • PCOS (polycystic ovarian syndrome) 07/17/2019   • Annual physical exam 04/25/2019   • Moderate dysplasia of cervix (SALTY II) 01/31/2019   • Hyperinsulinemia 12/19/2018   • Acne vulgaris 04/17/2018   • Hemorrhoids, external 04/21/2017     She  has a past surgical history that includes pr breast reduction (Bilateral, 2/22/2018); [place] tooth extraction; Reduction mammaplasty; EGD; and pr laps gstrc rstrictiv px longitudinal gastrectomy (N/A, 8/13/2019). Her family history includes Breast cancer in her maternal aunt; Cancer in her maternal aunt; Clotting disorder in her mother; Diabetes in her father. She  reports that she has never smoked. She has never used smokeless tobacco. She reports current alcohol use. She reports that she does not use drugs. Current Outpatient Medications   Medication Sig Dispense Refill   • albuterol (Ventolin HFA) 90 mcg/act inhaler Inhale 2 puffs every 6 (six) hours as needed for wheezing 1 Inhaler 1   • clindamycin-benzoyl peroxide (BENZACLIN) gel Apply to face twice daily for acne (Patient taking differently: Apply to face prn for acne) 25 g 1   • hydrocortisone 2.5 % ointment Apply topically 2 (two) times a day (Patient taking differently: Apply topically 2 (two) times a day as needed) 30 g 2   • loratadine (CLARITIN) 10 mg tablet Take 1 tablet (10 mg total) by mouth in the morning. (Patient taking differently: Take 10 mg by mouth daily as needed) 30 tablet 3   • NuvaRing 0.12-0.015 MG/24HR vaginal ring Insert vaginally and leave in place for 3 consecutive weeks, then remove for 1 week.  1 each 11   • pimecrolimus (ELIDEL) 1 % cream Apply topically 2 (two) times a day To upper eyelids (Patient taking differently: Apply topically 2 (two) times a day as needed To upper eyelids) 30 g 3   • Wegovy 2.4 MG/0.75ML Inject 0.75 mL (2.4 mg total) under the skin once a week 3 mL 2     No current facility-administered medications for this visit. Current Outpatient Medications on File Prior to Visit   Medication Sig   • albuterol (Ventolin HFA) 90 mcg/act inhaler Inhale 2 puffs every 6 (six) hours as needed for wheezing   • clindamycin-benzoyl peroxide (BENZACLIN) gel Apply to face twice daily for acne (Patient taking differently: Apply to face prn for acne)   • hydrocortisone 2.5 % ointment Apply topically 2 (two) times a day (Patient taking differently: Apply topically 2 (two) times a day as needed)   • loratadine (CLARITIN) 10 mg tablet Take 1 tablet (10 mg total) by mouth in the morning. (Patient taking differently: Take 10 mg by mouth daily as needed)   • NuvaRing 0.12-0.015 MG/24HR vaginal ring Insert vaginally and leave in place for 3 consecutive weeks, then remove for 1 week. • pimecrolimus (ELIDEL) 1 % cream Apply topically 2 (two) times a day To upper eyelids (Patient taking differently: Apply topically 2 (two) times a day as needed To upper eyelids)   • [DISCONTINUED] Wegovy 2.4 MG/0.75ML Inject 2.4 mg under the skin once a week     No current facility-administered medications on file prior to visit. She has No Known Allergies. .    Review of Systems   Constitutional: Negative for fever. Respiratory: Negative for shortness of breath. Cardiovascular: Negative for chest pain and palpitations. Gastrointestinal: Negative for abdominal pain, constipation, diarrhea and vomiting. +GERD   Genitourinary: Negative for difficulty urinating. Musculoskeletal: Negative for arthralgias and back pain. Skin: Negative for rash. Neurological: Negative for headaches. Psychiatric/Behavioral: Negative for dysphoric mood.  The patient is not nervous/anxious. Objective:    /76   Pulse 96   Ht 5' 3" (1.6 m)   Wt 73.8 kg (162 lb 9.6 oz)   BMI 28.80 kg/m²      Physical Exam  Vitals and nursing note reviewed. Constitutional:       General: She is not in acute distress. Appearance: She is well-developed. Comments: overweight     HENT:      Head: Normocephalic and atraumatic. Eyes:      Conjunctiva/sclera: Conjunctivae normal.   Pulmonary:      Effort: Pulmonary effort is normal. No respiratory distress. Skin:     Findings: No rash (visible). Neurological:      Mental Status: She is alert and oriented to person, place, and time.    Psychiatric:         Behavior: Behavior normal.

## 2023-09-08 ENCOUNTER — TELEPHONE (OUTPATIENT)
Dept: BARIATRICS | Facility: CLINIC | Age: 28
End: 2023-09-08

## 2023-09-08 NOTE — TELEPHONE ENCOUNTER
Patient and pharm was informed regarding the [facility] approval from 09/07/2023-03/07/2024. She was also informed about the 4/5% wt loss requirement for renewal purposes.

## 2023-10-19 ENCOUNTER — OFFICE VISIT (OUTPATIENT)
Dept: FAMILY MEDICINE CLINIC | Facility: CLINIC | Age: 28
End: 2023-10-19
Payer: COMMERCIAL

## 2023-10-19 VITALS
BODY MASS INDEX: 28.56 KG/M2 | OXYGEN SATURATION: 97 % | TEMPERATURE: 96.8 F | RESPIRATION RATE: 16 BRPM | HEIGHT: 63 IN | HEART RATE: 67 BPM | SYSTOLIC BLOOD PRESSURE: 112 MMHG | WEIGHT: 161.2 LBS | DIASTOLIC BLOOD PRESSURE: 90 MMHG

## 2023-10-19 DIAGNOSIS — R20.2 BILATERAL LEG PARESTHESIA: Primary | ICD-10-CM

## 2023-10-19 DIAGNOSIS — Z98.84 BARIATRIC SURGERY STATUS: Chronic | ICD-10-CM

## 2023-10-19 DIAGNOSIS — Z23 ENCOUNTER FOR IMMUNIZATION: ICD-10-CM

## 2023-10-19 DIAGNOSIS — E66.3 OVERWEIGHT: ICD-10-CM

## 2023-10-19 PROCEDURE — 99214 OFFICE O/P EST MOD 30 MIN: CPT | Performed by: STUDENT IN AN ORGANIZED HEALTH CARE EDUCATION/TRAINING PROGRAM

## 2023-10-19 PROCEDURE — 90686 IIV4 VACC NO PRSV 0.5 ML IM: CPT

## 2023-10-19 PROCEDURE — 90471 IMMUNIZATION ADMIN: CPT

## 2023-10-19 NOTE — ASSESSMENT & PLAN NOTE
Onset 2 weeks ago, symptoms awaken patient at night. Dx include restless leg, peripheral neuropathy, and nutritional deficiencies in the setting of bariatric surgery status. Have advised completing labs ordered in April (including iron panel, B12, CBC, folate, and vitamin D). Will also check CMP. Follow up pending lab results.

## 2023-10-19 NOTE — PROGRESS NOTES
Name: Dagoberto Mckeon      : 1995      MRN: [id number]  Encounter Provider: Adela Bush MD  Encounter Date: 10/19/2023   Encounter department: [facility]     1. Bilateral leg paresthesia  Assessment & Plan: Onset 2 weeks ago, symptoms awaken patient at night. Dx include restless leg, peripheral neuropathy, and nutritional deficiencies in the setting of bariatric surgery status. Have advised completing labs ordered in April (including iron panel, B12, CBC, folate, and vitamin D). Will also check CMP. Follow up pending lab results. Orders:  -     Comprehensive metabolic panel; Future    2. Bariatric surgery status  Assessment & Plan:  Following with bariatrics. Current BMI 28.56. S/p Vertical Sleeve Gastrectomy with Dr. Yamilka Nixon on 2019. Has not had routine lab monitoring in the last year. 3. Overweight  Assessment & Plan:  Check lipid and a1c      4. Encounter for immunization  -     influenza vaccine, quadrivalent, 0.5 mL, preservative-free, for adult and pediatric patients 6 mos+ (AFLURIA, FLUARIX, FLULAVAL, FLUZONE)        Depression Screening and Follow-up Plan: Patient was screened for depression during today's encounter. They screened negative with a PHQ-2 score of 0. Subjective     This is a very pleasant 32 y.o. female who presents to the clinic for paraesthesias of her LE. She explains the sensation feels similar to when she has been out in the cold and then takes a hot shower. She reports it as uncomfortable and unfamiliar sensation. Symptom onset 2 weeks ago. Symptoms are bothersome enough to wake her up at night. Has PMH of bariatric surgery. Currently taking multivitamin. Denies fever, rash, shortness of breath, n/v/d/c. There is a history of blood clotting disorders in her family. Review of Systems   Constitutional:  Negative for chills and fever.    HENT:  Negative for congestion, ear pain, rhinorrhea and sinus pain.    Eyes:  Negative for visual disturbance. Respiratory:  Negative for chest tightness, shortness of breath and wheezing. Cardiovascular:  Negative for chest pain and palpitations. Gastrointestinal:  Negative for abdominal pain, constipation, diarrhea and vomiting. Endocrine: Negative for polyuria. Genitourinary:  Negative for dysuria. Musculoskeletal:  Negative for arthralgias and myalgias. Neurological:  Negative for dizziness, syncope and light-headedness.        Past Medical History:   Diagnosis Date    Abnormal Pap smear of cervix     2018/2019 - HSIL/CIN2-3    Acne     Asthma     exercise induced asthma/no recent problems    Bariatric surgery status     Chronic thoracic back pain 11/9/2017    Eczema     Exercises 1 to 2 times per week     occas 3x/week    External hemorrhoids     last assessed 8/15/12; resolved 6/13/14    Fatigue     last assessed 4/8/13; resolved 6/13/14    Hospital discharge follow-up 8/19/2019    HPV (human papilloma virus) infection     2019 (+) HRHPV type 16/18 negative    Obesity     PCOS (polycystic ovarian syndrome)     Postsurgical malabsorption      Past Surgical History:   Procedure Laterality Date    EGD      MD BREAST REDUCTION Bilateral 2/22/2018    Procedure: BREAST REDUCTION;  Surgeon: Missy Cotto MD;  [facility];  Service: Plastics    [facility] [address] [place] [address] LONGITUDINAL GASTRECTOMY N/A 8/13/2019    Procedure: GASTRECTOMY LAPAROSCOPIC SLEEVE WITH ROBOTICS;  Surgeon: Steffanie Troy MD;  Location: [facility];  Service: Bariatrics    REDUCTION MAMMAPLASTY      WISDOM TOOTH EXTRACTION       Family History   Problem Relation Age of Onset    Clotting disorder Mother         with menstration    Diabetes Father     Breast cancer Maternal Aunt     Cancer Maternal Aunt         breast cancer    Hypertension Neg Hx     Heart disease Neg Hx     Thyroid disease Neg Hx     Stroke Neg Hx      Social History     Socioeconomic History    Marital status: Single Spouse name: None    Number of children: None    Years of education: None    Highest education level: None   Occupational History    None   Tobacco Use    Smoking status: Never    Smokeless tobacco: Never   Vaping Use    Vaping Use: Never used   Substance and Sexual Activity    Alcohol use: Yes     Comment: social    Drug use: No    Sexual activity: Yes     Partners: Male     Birth control/protection: Ring   Other Topics Concern    None   Social History Narrative    None     Social Determinants of Health     Financial Resource Strain: Not on file   Food Insecurity: Not on file   Transportation Needs: Not on file   Physical Activity: Not on file   Stress: Not on file   Social Connections: Not on file   Intimate Partner Violence: Not on file   Housing Stability: Not on file     Current Outpatient Medications on File Prior to Visit   Medication Sig    albuterol (Ventolin HFA) 90 mcg/act inhaler Inhale 2 puffs every 6 (six) hours as needed for wheezing    loratadine (CLARITIN) 10 mg tablet Take 1 tablet (10 mg total) by mouth in the morning. (Patient taking differently: Take 10 mg by mouth daily as needed)    NuvaRing 0.12-0.015 MG/24HR vaginal ring Insert vaginally and leave in place for 3 consecutive weeks, then remove for 1 week.     clindamycin-benzoyl peroxide (BENZACLIN) gel Apply to face twice daily for acne (Patient not taking: Reported on 10/19/2023)    hydrocortisone 2.5 % ointment Apply topically 2 (two) times a day (Patient not taking: Reported on 10/19/2023)    pimecrolimus (ELIDEL) 1 % cream Apply topically 2 (two) times a day To upper eyelids (Patient not taking: Reported on 10/19/2023)     No Known Allergies  Immunization History   Administered Date(s) Administered    COVID-19 MODERNA VACC 0.5 ML IM 01/21/2021, 02/19/2021, 11/04/2021    COVID-19 Pfizer Vac BIVALENT Shashi-sucrose 12 Yr+ IM 10/30/2022    DTP 01/14/1994, 03/15/1994, 05/11/1994, 03/15/1995    DTaP 5 12/14/1998    HPV Quadrivalent 03/13/2007, 05/17/2007, 12/19/2007    Hep A, adult 03/13/2007, 12/19/2007    Hep B, adult 1995, 01/04/1994, 08/11/1994    Hib (PRP-OMP) 01/14/1994, 03/15/1994, 05/11/1994, 03/15/1995    INFLUENZA 01/16/2015, 10/12/2016, 11/09/2017, 10/10/2018, 10/20/2022    Influenza Quadrivalent Preservative Free 3 years and older IM 01/16/2015, 10/12/2016, 11/09/2017    Influenza, injectable, quadrivalent, preservative free 0.5 mL 10/22/2019, 10/13/2020, 10/16/2021, 10/20/2022    Influenza, seasonal, injectable 12/19/2007, 11/19/2008, 10/06/2009, 01/11/2013, 11/13/2013    MMR 11/30/1994, 12/01/1997    Meningococcal Polysaccharide (MPSV4) 03/13/2007    OPV 01/14/1994, 03/15/1994, 05/11/1994, 12/14/1998    Td (adult), adsorbed 08/17/2004    Tdap 04/08/2009, 10/12/2016    Tuberculin Skin Test-PPD Intradermal 10/17/2016, 05/15/2019, 05/22/2019, 10/22/2019, 08/03/2020, 04/05/2023    Varicella 03/20/1997, 12/19/2007       Objective     /90 (BP Location: Left arm, Patient Position: Sitting, Cuff Size: Adult)   Pulse 67   Temp (!) 96.8 °F (36 °C) (Tympanic)   Resp 16   Ht 5' 3" (1.6 m)   Wt 73.1 kg (161 lb 3.2 oz)   SpO2 97%   BMI 28.56 kg/m²     Physical Exam  Constitutional:       Appearance: Normal appearance. HENT:      Head: Normocephalic and atraumatic. Nose: Nose normal.   Eyes:      Conjunctiva/sclera: Conjunctivae normal.   Cardiovascular:      Rate and Rhythm: Normal rate. Pulmonary:      Effort: Pulmonary effort is normal.   Musculoskeletal:         General: Normal range of motion. Cervical back: Normal range of motion. Right lower leg: No edema. Left lower leg: No edema. Skin:     General: Skin is warm and dry. Coloration: Skin is not pale. Findings: No bruising or rash. Neurological:      Mental Status: She is alert and oriented to person, place, and time. Sensory: No sensory deficit. Motor: No weakness.    Psychiatric:         Behavior: Behavior normal.       Sugar King Mendoza Longo MD

## 2023-10-19 NOTE — ASSESSMENT & PLAN NOTE
Following with bariatrics. Current BMI 28.56. S/p Vertical Sleeve Gastrectomy with Dr. Yamilka Nixon on 8/13/2019. Has not had routine lab monitoring in the last year.

## 2023-10-20 ENCOUNTER — APPOINTMENT (OUTPATIENT)
Dept: LAB | Facility: CLINIC | Age: 28
End: 2023-10-20
Payer: COMMERCIAL

## 2023-10-20 DIAGNOSIS — E16.1 HYPERINSULINEMIA: ICD-10-CM

## 2023-10-20 DIAGNOSIS — Z13.6 SCREENING FOR CARDIOVASCULAR CONDITION: ICD-10-CM

## 2023-10-20 DIAGNOSIS — R20.2 BILATERAL LEG PARESTHESIA: ICD-10-CM

## 2023-10-20 DIAGNOSIS — Z98.84 BARIATRIC SURGERY STATUS: ICD-10-CM

## 2023-10-20 LAB
25(OH)D3 SERPL-MCNC: 43.6 NG/ML (ref 30–100)
ALBUMIN SERPL BCP-MCNC: 3.8 G/DL (ref 3.5–5)
ALP SERPL-CCNC: 60 U/L (ref 34–104)
ALT SERPL W P-5'-P-CCNC: 8 U/L (ref 7–52)
ANION GAP SERPL CALCULATED.3IONS-SCNC: 5 MMOL/L
AST SERPL W P-5'-P-CCNC: 10 U/L (ref 13–39)
BASOPHILS # BLD AUTO: 0.05 THOUSANDS/ÂΜL (ref 0–0.1)
BASOPHILS NFR BLD AUTO: 1 % (ref 0–1)
BILIRUB SERPL-MCNC: 0.49 MG/DL (ref 0.2–1)
BUN SERPL-MCNC: 15 MG/DL (ref 5–25)
CALCIUM SERPL-MCNC: 8.7 MG/DL (ref 8.4–10.2)
CHLORIDE SERPL-SCNC: 108 MMOL/L (ref 96–108)
CHOLEST SERPL-MCNC: 158 MG/DL
CO2 SERPL-SCNC: 27 MMOL/L (ref 21–32)
CREAT SERPL-MCNC: 0.74 MG/DL (ref 0.6–1.3)
EOSINOPHIL # BLD AUTO: 0.12 THOUSAND/ÂΜL (ref 0–0.61)
EOSINOPHIL NFR BLD AUTO: 2 % (ref 0–6)
ERYTHROCYTE [DISTWIDTH] IN BLOOD BY AUTOMATED COUNT: 12.3 % (ref 11.6–15.1)
EST. AVERAGE GLUCOSE BLD GHB EST-MCNC: 126 MG/DL
FERRITIN SERPL-MCNC: 29 NG/ML (ref 11–307)
FOLATE SERPL-MCNC: 9 NG/ML
GFR SERPL CREATININE-BSD FRML MDRD: 109 ML/MIN/1.73SQ M
GLUCOSE P FAST SERPL-MCNC: 75 MG/DL (ref 65–99)
HBA1C MFR BLD: 6 %
HCT VFR BLD AUTO: 38 % (ref 34.8–46.1)
HDLC SERPL-MCNC: 64 MG/DL
HGB BLD-MCNC: 12.4 G/DL (ref 11.5–15.4)
IMM GRANULOCYTES # BLD AUTO: 0.02 THOUSAND/UL (ref 0–0.2)
IMM GRANULOCYTES NFR BLD AUTO: 0 % (ref 0–2)
IRON SATN MFR SERPL: 24 % (ref 15–50)
IRON SERPL-MCNC: 85 UG/DL (ref 50–212)
LDLC SERPL CALC-MCNC: 79 MG/DL (ref 0–100)
LYMPHOCYTES # BLD AUTO: 2.14 THOUSANDS/ÂΜL (ref 0.6–4.47)
LYMPHOCYTES NFR BLD AUTO: 33 % (ref 14–44)
MCH RBC QN AUTO: 30.1 PG (ref 26.8–34.3)
MCHC RBC AUTO-ENTMCNC: 32.6 G/DL (ref 31.4–37.4)
MCV RBC AUTO: 92 FL (ref 82–98)
MONOCYTES # BLD AUTO: 0.32 THOUSAND/ÂΜL (ref 0.17–1.22)
MONOCYTES NFR BLD AUTO: 5 % (ref 4–12)
NEUTROPHILS # BLD AUTO: 3.87 THOUSANDS/ÂΜL (ref 1.85–7.62)
NEUTS SEG NFR BLD AUTO: 59 % (ref 43–75)
NONHDLC SERPL-MCNC: 94 MG/DL
NRBC BLD AUTO-RTO: 0 /100 WBCS
PLATELET # BLD AUTO: 213 THOUSANDS/UL (ref 149–390)
PMV BLD AUTO: 10.1 FL (ref 8.9–12.7)
POTASSIUM SERPL-SCNC: 4 MMOL/L (ref 3.5–5.3)
PROT SERPL-MCNC: 6.7 G/DL (ref 6.4–8.4)
RBC # BLD AUTO: 4.12 MILLION/UL (ref 3.81–5.12)
SODIUM SERPL-SCNC: 140 MMOL/L (ref 135–147)
TIBC SERPL-MCNC: 357 UG/DL (ref 250–450)
TRIGL SERPL-MCNC: 77 MG/DL
UIBC SERPL-MCNC: 272 UG/DL (ref 155–355)
VIT B12 SERPL-MCNC: 287 PG/ML (ref 180–914)
WBC # BLD AUTO: 6.52 THOUSAND/UL (ref 4.31–10.16)

## 2023-10-20 PROCEDURE — 85025 COMPLETE CBC W/AUTO DIFF WBC: CPT

## 2023-10-20 PROCEDURE — 83550 IRON BINDING TEST: CPT

## 2023-10-20 PROCEDURE — 82306 VITAMIN D 25 HYDROXY: CPT

## 2023-10-20 PROCEDURE — 82746 ASSAY OF FOLIC ACID SERUM: CPT

## 2023-10-20 PROCEDURE — 83036 HEMOGLOBIN GLYCOSYLATED A1C: CPT

## 2023-10-20 PROCEDURE — 80061 LIPID PANEL: CPT

## 2023-10-20 PROCEDURE — 83540 ASSAY OF IRON: CPT

## 2023-10-20 PROCEDURE — 82728 ASSAY OF FERRITIN: CPT

## 2023-10-20 PROCEDURE — 36415 COLL VENOUS BLD VENIPUNCTURE: CPT

## 2023-10-20 PROCEDURE — 80053 COMPREHEN METABOLIC PANEL: CPT

## 2023-10-20 PROCEDURE — 84590 ASSAY OF VITAMIN A: CPT

## 2023-10-20 PROCEDURE — 82607 VITAMIN B-12: CPT

## 2023-10-26 LAB — VIT A SERPL-MCNC: 26.3 UG/DL (ref 18.9–57.3)

## 2023-10-31 ENCOUNTER — TELEPHONE (OUTPATIENT)
Dept: FAMILY MEDICINE CLINIC | Facility: CLINIC | Age: 28
End: 2023-10-31

## 2023-12-13 DIAGNOSIS — L70.0 ACNE VULGARIS: ICD-10-CM

## 2023-12-13 DIAGNOSIS — N92.0 MENORRHAGIA WITH REGULAR CYCLE: ICD-10-CM

## 2023-12-13 RX ORDER — ETONOGESTREL AND ETHINYL ESTRADIOL VAGINAL RING .015; .12 MG/D; MG/D
RING VAGINAL
Qty: 1 EACH | Refills: 11 | Status: SHIPPED | OUTPATIENT
Start: 2023-12-13

## 2023-12-13 NOTE — TELEPHONE ENCOUNTER
Pt maria elenaom, needs a generic form of her birth control, new insurance does not cover name brand.

## 2023-12-30 ENCOUNTER — OFFICE VISIT (OUTPATIENT)
Dept: URGENT CARE | Age: 28
End: 2023-12-30
Payer: COMMERCIAL

## 2023-12-30 VITALS
RESPIRATION RATE: 18 BRPM | OXYGEN SATURATION: 97 % | HEART RATE: 103 BPM | TEMPERATURE: 97.6 F | DIASTOLIC BLOOD PRESSURE: 82 MMHG | SYSTOLIC BLOOD PRESSURE: 110 MMHG

## 2023-12-30 DIAGNOSIS — J06.9 UPPER RESPIRATORY TRACT INFECTION, UNSPECIFIED TYPE: Primary | ICD-10-CM

## 2023-12-30 PROCEDURE — G0382 LEV 3 HOSP TYPE B ED VISIT: HCPCS | Performed by: NURSE PRACTITIONER

## 2023-12-30 PROCEDURE — S9083 URGENT CARE CENTER GLOBAL: HCPCS | Performed by: NURSE PRACTITIONER

## 2023-12-30 RX ORDER — BENZONATATE 200 MG/1
200 CAPSULE ORAL 3 TIMES DAILY PRN
Qty: 20 CAPSULE | Refills: 0 | Status: SHIPPED | OUTPATIENT
Start: 2023-12-30

## 2023-12-30 RX ORDER — PREDNISONE 20 MG/1
20 TABLET ORAL 2 TIMES DAILY WITH MEALS
Qty: 10 TABLET | Refills: 0 | Status: SHIPPED | OUTPATIENT
Start: 2023-12-30 | End: 2024-01-04

## 2023-12-30 NOTE — PROGRESS NOTES
[facility]        NAME: Sobeida Little is a 28 y.o. female  : 1995    MRN: [id number]  DATE: 2023  TIME: 10:41 AM    Assessment and Plan   Upper respiratory tract infection, unspecified type [J06.9]  1. Upper respiratory tract infection, unspecified type  benzonatate (TESSALON) 200 MG capsule    predniSONE 20 mg tablet            Patient Instructions     Take meds as prescribed  Follow up with PCP in 3-5 days.  Proceed to  ER if symptoms worsen.    Chief Complaint     Chief Complaint   Patient presents with    Cough     Patient feeling tightness and cough. Symptoms started yesterday. Hx bronchitis.         History of Present Illness       HPI  Reports cough and chest tightness, starting yesterday. Hx of asthma. No wheezing or SOB.     Review of Systems   Review of Systems   Constitutional:  Negative for chills and fever.   HENT:  Positive for rhinorrhea. Negative for congestion and sore throat.    Respiratory:  Positive for cough and chest tightness. Negative for shortness of breath and wheezing.    Cardiovascular:  Negative for chest pain.   Gastrointestinal:  Negative for diarrhea and vomiting.   Neurological:  Negative for headaches.         Current Medications       Current Outpatient Medications:     albuterol (Ventolin HFA) 90 mcg/act inhaler, Inhale 2 puffs every 6 (six) hours as needed for wheezing, Disp: 1 Inhaler, Rfl: 1    benzonatate (TESSALON) 200 MG capsule, Take 1 capsule (200 mg total) by mouth 3 (three) times a day as needed for cough, Disp: 20 capsule, Rfl: 0    etonogestrel-ethinyl estradiol (NuvaRing) 0.12-0.015 MG/24HR vaginal ring, Insert vaginally and leave in place for 3 consecutive weeks, then remove for 1 week., Disp: 1 each, Rfl: 11    predniSONE 20 mg tablet, Take 1 tablet (20 mg total) by mouth 2 (two) times a day with meals for 5 days, Disp: 10 tablet, Rfl: 0    clindamycin-benzoyl peroxide (BENZACLIN) gel, Apply to face twice daily for acne (Patient  not taking: Reported on 10/19/2023), Disp: 25 g, Rfl: 1    hydrocortisone 2.5 % ointment, Apply topically 2 (two) times a day (Patient not taking: Reported on 12/30/2023), Disp: 30 g, Rfl: 2    loratadine (CLARITIN) 10 mg tablet, Take 1 tablet (10 mg total) by mouth in the morning. (Patient not taking: Reported on 12/30/2023), Disp: 30 tablet, Rfl: 3    pimecrolimus (ELIDEL) 1 % cream, Apply topically 2 (two) times a day To upper eyelids (Patient not taking: Reported on 10/19/2023), Disp: 30 g, Rfl: 3    Current Allergies     Allergies as of 12/30/2023    (No Known Allergies)            The following portions of the patient's history were reviewed and updated as appropriate: allergies, current medications, past family history, past medical history, past social history, past surgical history and problem list.     Past Medical History:   Diagnosis Date    Abnormal Pap smear of cervix     2018/2019 - HSIL/CIN2-3    Acne     Asthma     exercise induced asthma/no recent problems    Bariatric surgery status     Chronic thoracic back pain 11/9/2017    Eczema     Exercises 1 to 2 times per week     occas 3x/week    External hemorrhoids     last assessed 8/15/12; resolved 6/13/14    Fatigue     last assessed 4/8/13; resolved 6/13/14    Hospital discharge follow-up 8/19/2019    HPV (human papilloma virus) infection     2019 (+) HRHPV type 16/18 negative    Obesity     PCOS (polycystic ovarian syndrome)     Postsurgical malabsorption        Past Surgical History:   Procedure Laterality Date    EGD      PA BREAST REDUCTION Bilateral 2/22/2018    Procedure: BREAST REDUCTION;  Surgeon: Claudia Rose MD;  [facility];  Service: Plastics    PA LAPS [facility] RSTRICTIV PX LONGITUDINAL GASTRECTOMY N/A 8/13/2019    Procedure: GASTRECTOMY LAPAROSCOPIC SLEEVE WITH ROBOTICS;  Surgeon: Bryant Figueroa MD;  [facility];  Service: Bariatrics    REDUCTION MAMMAPLASTY      WISDOM TOOTH EXTRACTION         Family History   Problem  Relation Age of Onset    Clotting disorder Mother         with menstration    Diabetes Father     Breast cancer Maternal Aunt     Cancer Maternal Aunt         breast cancer    Hypertension Neg Hx     Heart disease Neg Hx     Thyroid disease Neg Hx     Stroke Neg Hx          Medications have been verified.        Objective   /82   Pulse 103   Temp 97.6 °F (36.4 °C)   Resp 18   SpO2 97%   No LMP recorded. (Menstrual status: Birth Control).       Physical Exam     Physical Exam  Constitutional:       Appearance: She is not ill-appearing or diaphoretic.   HENT:      Right Ear: Tympanic membrane normal.      Left Ear: Tympanic membrane normal.      Nose: Rhinorrhea present.      Mouth/Throat:      Pharynx: No posterior oropharyngeal erythema.   Cardiovascular:      Rate and Rhythm: Regular rhythm.      Heart sounds: Normal heart sounds.   Pulmonary:      Effort: Pulmonary effort is normal.      Breath sounds: Normal breath sounds. No wheezing.

## 2024-01-10 DIAGNOSIS — L70.0 ACNE VULGARIS: ICD-10-CM

## 2024-01-10 DIAGNOSIS — N92.0 MENORRHAGIA WITH REGULAR CYCLE: ICD-10-CM

## 2024-01-10 RX ORDER — ETONOGESTREL AND ETHINYL ESTRADIOL .12; .015 MG/D; MG/D
RING VAGINAL
Qty: 3 EACH | Refills: 4 | Status: SHIPPED | OUTPATIENT
Start: 2024-01-10

## 2024-01-11 ENCOUNTER — TELEPHONE (OUTPATIENT)
Dept: BARIATRICS | Facility: CLINIC | Age: 29
End: 2024-01-11

## 2024-01-11 NOTE — TELEPHONE ENCOUNTER
Provider sent a message on my chart to verify if patient is still on [employer]. Per provider patient stated that she is no longer on [employer].

## 2024-01-11 NOTE — TELEPHONE ENCOUNTER
Provider was informed regarding the denial for wegovy due to plan exclusion. Please advise what other options do the patient have at this time.

## 2024-01-22 ENCOUNTER — OFFICE VISIT (OUTPATIENT)
Dept: BARIATRICS | Facility: CLINIC | Age: 29
End: 2024-01-22
Payer: COMMERCIAL

## 2024-01-22 VITALS
SYSTOLIC BLOOD PRESSURE: 105 MMHG | BODY MASS INDEX: 28.95 KG/M2 | HEIGHT: 63 IN | WEIGHT: 163.4 LBS | HEART RATE: 75 BPM | DIASTOLIC BLOOD PRESSURE: 70 MMHG | RESPIRATION RATE: 16 BRPM

## 2024-01-22 DIAGNOSIS — E16.1 HYPERINSULINEMIA: ICD-10-CM

## 2024-01-22 DIAGNOSIS — E66.3 OVERWEIGHT: Primary | ICD-10-CM

## 2024-01-22 DIAGNOSIS — Z98.84 BARIATRIC SURGERY STATUS: Chronic | ICD-10-CM

## 2024-01-22 DIAGNOSIS — R79.89 LOW VITAMIN B12 LEVEL: ICD-10-CM

## 2024-01-22 DIAGNOSIS — R73.03 PREDIABETES: ICD-10-CM

## 2024-01-22 DIAGNOSIS — E28.2 PCOS (POLYCYSTIC OVARIAN SYNDROME): ICD-10-CM

## 2024-01-22 PROCEDURE — 99214 OFFICE O/P EST MOD 30 MIN: CPT | Performed by: PHYSICIAN ASSISTANT

## 2024-01-22 RX ORDER — DIPHENOXYLATE HYDROCHLORIDE AND ATROPINE SULFATE 2.5; .025 MG/1; MG/1
1 TABLET ORAL DAILY
COMMUNITY

## 2024-01-22 NOTE — ASSESSMENT & PLAN NOTE
-Patient is pursuing Conservative Program s/p Vertical Sleeve Gastrectomy with Dr. Candido Figueroa on 8/13/2019.   -Initial weight loss goal of 5-10% weight loss for improved health  -Screening labs 10/20/23    Initial:186  Last Visit:162.6  Current:163.4  Change-22.6 lb      Goals:  -continue to concentrate on protein goal and making sure to eat vegetables regularly.   -continue to try to drink at least 64oz of water daily.  -Continue walking regularly for exercise . Recommend 3 days a week at least 30 minutes    Medications  -wegovy was currently stopped due to insurance changes. She did have 14.5% weight loss currently since start at weight of 190.2 on 1/13/23.  To restart when able to . To check labs as A1C was 6.0 and consider ozempic.    -was on topamax and phentermine prior and did not lose appropriate weight  -was on metformin for PCOS and did not help with weight loss and had side effects

## 2024-01-22 NOTE — ASSESSMENT & PLAN NOTE
Last A1C was 6.0.  To recheck and consider ozempic since she is no longer on wegovy. She was on metformin prior with side effects.

## 2024-01-22 NOTE — PROGRESS NOTES
Assessment/Plan:    Overweight  -Patient is pursuing Conservative Program s/p Vertical Sleeve Gastrectomy with Dr. Candido Figueroa on 8/13/2019.   -Initial weight loss goal of 5-10% weight loss for improved health  -Screening labs 10/20/23    Initial:186  Last Visit:162.6  Current:163.4  Change-22.6 lb      Goals:  -continue to concentrate on protein goal and making sure to eat vegetables regularly.   -continue to try to drink at least 64oz of water daily.  -Continue walking regularly for exercise . Recommend 3 days a week at least 30 minutes    Medications  -wegovy was currently stopped due to insurance changes. She did have 14.5% weight loss currently since start at weight of 190.2 on 1/13/23.  To restart when able to . To check labs as A1C was 6.0 and consider ozempic.    -was on topamax and phentermine prior and did not lose appropriate weight  -was on metformin for PCOS and did not help with weight loss and had side effects    Hyperinsulinemia  Last A1C was 6.0.  To recheck and consider ozempic since she is no longer on wegovy. She was on metformin prior with side effects.     PCOS (polycystic ovarian syndrome)  To consider ozempic. Side effects on metformin prior    Low vitamin B12 level  On 2500mcg of b12.  To schedule surgical followup    Bariatric surgery status  Due for followup        Follow up in approximately  4 months  with Non-Surgical Physician/Advanced Practitioner.     Diagnoses and all orders for this visit:    Overweight  -     Comprehensive metabolic panel; Future  -     Hemoglobin A1C; Future    Prediabetes  -     Comprehensive metabolic panel; Future  -     Hemoglobin A1C; Future    Hyperinsulinemia    PCOS (polycystic ovarian syndrome)    Low vitamin B12 level    Bariatric surgery status    Other orders  -     Magnesium Gluconate (MAG-G PO); Take by mouth  -     multivitamin (THERAGRAN) TABS; Take 1 tablet by mouth daily          Subjective:   Chief Complaint   Patient presents with    Follow-up      [facility]- [facility] f/u- Waist 31.5in        Patient ID: Sobeida Little  is a 28 y.o. female with excess weight/obesity here to pursue weight managment.  Patient is pursuing Conservative Program.     HPI here for [facility] followup.. s/p Vertical Sleeve Gastrectomy with Dr. Candido Figueroa on 8/13/2019.    She was low in iron and b12.  She is taking oral supplements currently    She was on wegovy 2.4mg but her insurance changed and she has not had it for about 3 weeks. She was doing well on it and is seeing weight increase. [facility] would like to restart. She gets  in April and will be switching insurances at that time  Wt Readings from Last 10 Encounters:   01/22/24 74.1 kg (163 lb 6.4 oz)   10/19/23 73.1 kg (161 lb 3.2 oz)   09/07/23 73.8 kg (162 lb 9.6 oz)   08/08/23 74.4 kg (164 lb)   05/25/23 77.7 kg (171 lb 3.2 oz)   04/05/23 80.9 kg (178 lb 6.4 oz)   03/15/23 82.7 kg (182 lb 6.4 oz)   01/18/23 86.5 kg (190 lb 9.6 oz)   01/13/23 86.3 kg (190 lb 3.2 oz)   09/08/22 85.1 kg (187 lb 9.6 oz)       Food logging:  Increased appetite/cravings:  Exercise:walking, coaching cheerleading  Hydration:at least 30 oz     Diet Recall:  Protein shake/smoothie  Protein typically (2 eggs/cheesestick/pickles)  Protein, vegetable and sometimes starch    Snack: cheese and crackers, popcorners  The following portions of the patient's history were reviewed and updated as appropriate: She  has a past medical history of Abnormal Pap smear of cervix, Acne, Asthma, Bariatric surgery status, Chronic thoracic back pain (11/9/2017), Eczema, Exercises 1 to 2 times per week, External hemorrhoids, Fatigue, Hospital discharge follow-up (8/19/2019), HPV (human papilloma virus) infection, Obesity, PCOS (polycystic ovarian syndrome), and Postsurgical malabsorption.  She   Patient Active Problem List    Diagnosis Date Noted    Bilateral leg paresthesia 10/19/2023    Abnormal uterine bleeding (AUB) 08/08/2023    Upper respiratory tract infection 07/18/2023    Viral  infection, unspecified 07/27/2022    Acute nonintractable headache 05/12/2022    History of COVID-19 01/06/2021    Menorrhagia with regular cycle 09/29/2020    Overweight 09/16/2020    Extrinsic asthma without complication 05/18/2020    Encounter for surgical aftercare following surgery of digestive system 03/16/2020    Low vitamin B12 level 03/16/2020    High blood copper level 03/16/2020    Postsurgical malabsorption 11/25/2019    Need for vaccination 10/22/2019    Encounter for gynecological examination (general) (routine) without abnormal findings 09/17/2019    Bariatric surgery status 09/10/2019    PCOS (polycystic ovarian syndrome) 07/17/2019    Annual physical exam 04/25/2019    Moderate dysplasia of cervix (SALTY II) 01/31/2019    Hyperinsulinemia 12/19/2018    Acne vulgaris 04/17/2018    Hemorrhoids, external 04/21/2017     She  has a past surgical history that includes pr breast reduction (Bilateral, 2/22/2018); [place] tooth extraction; Reduction mammaplasty; EGD; and pr laps gstrc rstrictiv px longitudinal gastrectomy (N/A, 8/13/2019).  Her family history includes Breast cancer in her maternal aunt; Cancer in her maternal aunt; Clotting disorder in her mother; Diabetes in her father.  She  reports that she has never smoked. She has never used smokeless tobacco. She reports current alcohol use. She reports that she does not use drugs.  Current Outpatient Medications   Medication Sig Dispense Refill    albuterol (Ventolin HFA) 90 mcg/act inhaler Inhale 2 puffs every 6 (six) hours as needed for wheezing (Patient taking differently: Inhale 2 puffs as needed for wheezing) 1 Inhaler 1    etonogestrel-ethinyl estradiol (EluRyng) 0.12-0.015 MG/24HR vaginal ring INSERT 1 RING VAGINALLY AS DIRECTED. REMOVE AFTER 3 WEEKS & WAIT 7 DAYS BEFORE INSERTING A NEW RING 3 each 4    loratadine (CLARITIN) 10 mg tablet Take 1 tablet (10 mg total) by mouth in the morning. (Patient taking differently: Take 10 mg by mouth as  needed) 30 tablet 3    Magnesium Gluconate (MAG-G PO) Take by mouth      multivitamin (THERAGRAN) TABS Take 1 tablet by mouth daily      pimecrolimus (ELIDEL) 1 % cream Apply topically 2 (two) times a day To upper eyelids (Patient taking differently: Apply topically as needed To upper eyelids) 30 g 3    benzonatate (TESSALON) 200 MG capsule Take 1 capsule (200 mg total) by mouth 3 (three) times a day as needed for cough (Patient not taking: Reported on 1/22/2024) 20 capsule 0    clindamycin-benzoyl peroxide (BENZACLIN) gel Apply to face twice daily for acne (Patient not taking: Reported on 10/19/2023) 25 g 1    hydrocortisone 2.5 % ointment Apply topically 2 (two) times a day (Patient not taking: Reported on 12/30/2023) 30 g 2     No current facility-administered medications for this visit.     Current Outpatient Medications on File Prior to Visit   Medication Sig    albuterol (Ventolin HFA) 90 mcg/act inhaler Inhale 2 puffs every 6 (six) hours as needed for wheezing (Patient taking differently: Inhale 2 puffs as needed for wheezing)    etonogestrel-ethinyl estradiol (EluRyng) 0.12-0.015 MG/24HR vaginal ring INSERT 1 RING VAGINALLY AS DIRECTED. REMOVE AFTER 3 WEEKS & WAIT 7 DAYS BEFORE INSERTING A NEW RING    loratadine (CLARITIN) 10 mg tablet Take 1 tablet (10 mg total) by mouth in the morning. (Patient taking differently: Take 10 mg by mouth as needed)    Magnesium Gluconate (MAG-G PO) Take by mouth    multivitamin (THERAGRAN) TABS Take 1 tablet by mouth daily    pimecrolimus (ELIDEL) 1 % cream Apply topically 2 (two) times a day To upper eyelids (Patient taking differently: Apply topically as needed To upper eyelids)    benzonatate (TESSALON) 200 MG capsule Take 1 capsule (200 mg total) by mouth 3 (three) times a day as needed for cough (Patient not taking: Reported on 1/22/2024)    clindamycin-benzoyl peroxide (BENZACLIN) gel Apply to face twice daily for acne (Patient not taking: Reported on 10/19/2023)     "hydrocortisone 2.5 % ointment Apply topically 2 (two) times a day (Patient not taking: Reported on 12/30/2023)     No current facility-administered medications on file prior to visit.     She has No Known Allergies..    Review of Systems   Constitutional:  Negative for fever.   Respiratory:  Negative for shortness of breath.    Cardiovascular:  Negative for chest pain and palpitations.   Gastrointestinal:  Negative for abdominal pain, constipation, diarrhea and vomiting.   Genitourinary:  Negative for difficulty urinating.   Musculoskeletal:  Negative for arthralgias and back pain.   Skin:  Negative for rash.   Neurological:  Negative for headaches.   Psychiatric/Behavioral:  Negative for dysphoric mood. The patient is not nervous/anxious.        Objective:    /70   Pulse 75   Resp 16   Ht 5' 3\" (1.6 m)   Wt 74.1 kg (163 lb 6.4 oz)   BMI 28.95 kg/m²      Physical Exam  Vitals and nursing note reviewed.   Constitutional:       General: She is not in acute distress.     Appearance: Normal appearance. She is well-developed.   HENT:      Head: Normocephalic and atraumatic.   Eyes:      Conjunctiva/sclera: Conjunctivae normal.   Neck:      Thyroid: No thyromegaly.   Pulmonary:      Effort: Pulmonary effort is normal. No respiratory distress.   Skin:     Findings: No rash (visible).   Neurological:      Mental Status: She is alert and oriented to person, place, and time.   Psychiatric:         Mood and Affect: Mood normal.         Behavior: Behavior normal.         "

## 2024-01-24 ENCOUNTER — APPOINTMENT (OUTPATIENT)
Dept: LAB | Facility: CLINIC | Age: 29
End: 2024-01-24
Payer: COMMERCIAL

## 2024-01-24 DIAGNOSIS — R73.03 PREDIABETES: ICD-10-CM

## 2024-01-24 DIAGNOSIS — E66.3 OVERWEIGHT: ICD-10-CM

## 2024-01-24 LAB
ALBUMIN SERPL BCP-MCNC: 3.6 G/DL (ref 3.5–5)
ALP SERPL-CCNC: 67 U/L (ref 34–104)
ALT SERPL W P-5'-P-CCNC: 14 U/L (ref 7–52)
ANION GAP SERPL CALCULATED.3IONS-SCNC: 4 MMOL/L
AST SERPL W P-5'-P-CCNC: 13 U/L (ref 13–39)
BILIRUB SERPL-MCNC: 0.39 MG/DL (ref 0.2–1)
BUN SERPL-MCNC: 16 MG/DL (ref 5–25)
CALCIUM SERPL-MCNC: 8.7 MG/DL (ref 8.4–10.2)
CHLORIDE SERPL-SCNC: 106 MMOL/L (ref 96–108)
CO2 SERPL-SCNC: 30 MMOL/L (ref 21–32)
CREAT SERPL-MCNC: 0.67 MG/DL (ref 0.6–1.3)
EST. AVERAGE GLUCOSE BLD GHB EST-MCNC: 94 MG/DL
GFR SERPL CREATININE-BSD FRML MDRD: 118 ML/MIN/1.73SQ M
GLUCOSE P FAST SERPL-MCNC: 74 MG/DL (ref 65–99)
HBA1C MFR BLD: 4.9 %
POTASSIUM SERPL-SCNC: 4.1 MMOL/L (ref 3.5–5.3)
PROT SERPL-MCNC: 6.3 G/DL (ref 6.4–8.4)
SODIUM SERPL-SCNC: 140 MMOL/L (ref 135–147)

## 2024-01-24 PROCEDURE — 83036 HEMOGLOBIN GLYCOSYLATED A1C: CPT

## 2024-01-24 PROCEDURE — 36415 COLL VENOUS BLD VENIPUNCTURE: CPT

## 2024-01-24 PROCEDURE — 80053 COMPREHEN METABOLIC PANEL: CPT

## 2024-02-07 ENCOUNTER — TELEPHONE (OUTPATIENT)
Age: 29
End: 2024-02-07

## 2024-02-07 DIAGNOSIS — E66.3 OVERWEIGHT: ICD-10-CM

## 2024-02-07 DIAGNOSIS — E28.2 PCOS (POLYCYSTIC OVARIAN SYNDROME): Primary | ICD-10-CM

## 2024-02-07 RX ORDER — TOPIRAMATE 25 MG/1
25 TABLET ORAL 2 TIMES DAILY
Qty: 60 TABLET | Refills: 1 | Status: SHIPPED | OUTPATIENT
Start: 2024-02-07 | End: 2024-04-07

## 2024-02-07 RX ORDER — PHENTERMINE HYDROCHLORIDE 37.5 MG/1
37.5 TABLET ORAL DAILY
Qty: 30 TABLET | Refills: 1 | Status: SHIPPED | OUTPATIENT
Start: 2024-02-07

## 2024-02-07 NOTE — TELEPHONE ENCOUNTER
PA for Phentermine    Submitted via  []CMM-KEY    [x]Surescripts-Case ID #   []Faxed to plan   []Other website    []Phone call Case ID #      Office notes sent, clinical questions answered. Awaiting determination

## 2024-02-08 NOTE — TELEPHONE ENCOUNTER
PA for Phentermine Denied    Reason:        Message sent to office clinical pool Yes    Denial letter scanned into Media Yes    Appeal started No Exlcuded

## 2024-02-21 PROBLEM — Z01.419 ENCOUNTER FOR GYNECOLOGICAL EXAMINATION (GENERAL) (ROUTINE) WITHOUT ABNORMAL FINDINGS: Status: RESOLVED | Noted: 2019-09-17 | Resolved: 2024-02-21

## 2024-02-29 DIAGNOSIS — E28.2 PCOS (POLYCYSTIC OVARIAN SYNDROME): ICD-10-CM

## 2024-02-29 DIAGNOSIS — E66.3 OVERWEIGHT: ICD-10-CM

## 2024-02-29 RX ORDER — TOPIRAMATE 25 MG/1
25 TABLET ORAL 2 TIMES DAILY
Qty: 180 TABLET | Refills: 0 | Status: SHIPPED | OUTPATIENT
Start: 2024-02-29

## 2024-03-04 ENCOUNTER — TELEPHONE (OUTPATIENT)
Dept: BARIATRICS | Facility: CLINIC | Age: 29
End: 2024-03-04

## 2024-03-04 NOTE — TELEPHONE ENCOUNTER
Called to reschedule OD ANNUAL with Cathryn (last seen 5/20/22), no answer, left message to return call.

## 2024-04-08 ENCOUNTER — OFFICE VISIT (OUTPATIENT)
Dept: DERMATOLOGY | Facility: CLINIC | Age: 29
End: 2024-04-08

## 2024-04-08 VITALS — WEIGHT: 174 LBS | BODY MASS INDEX: 30.82 KG/M2 | TEMPERATURE: 98 F

## 2024-04-08 DIAGNOSIS — D22.5 MULTIPLE BENIGN MELANOCYTIC NEVI OF BOTH UPPER EXTREMITIES, BOTH LOWER EXTREMITIES, AND TRUNK: ICD-10-CM

## 2024-04-08 DIAGNOSIS — D48.5 NEOPLASM OF UNCERTAIN BEHAVIOR OF SKIN: Primary | ICD-10-CM

## 2024-04-08 DIAGNOSIS — D22.72 MULTIPLE BENIGN MELANOCYTIC NEVI OF BOTH UPPER EXTREMITIES, BOTH LOWER EXTREMITIES, AND TRUNK: ICD-10-CM

## 2024-04-08 DIAGNOSIS — D22.71 MULTIPLE BENIGN MELANOCYTIC NEVI OF BOTH UPPER EXTREMITIES, BOTH LOWER EXTREMITIES, AND TRUNK: ICD-10-CM

## 2024-04-08 DIAGNOSIS — D22.62 MULTIPLE BENIGN MELANOCYTIC NEVI OF BOTH UPPER EXTREMITIES, BOTH LOWER EXTREMITIES, AND TRUNK: ICD-10-CM

## 2024-04-08 DIAGNOSIS — D22.61 MULTIPLE BENIGN MELANOCYTIC NEVI OF BOTH UPPER EXTREMITIES, BOTH LOWER EXTREMITIES, AND TRUNK: ICD-10-CM

## 2024-04-08 DIAGNOSIS — L81.4 SOLAR LENTIGO: ICD-10-CM

## 2024-04-08 NOTE — PATIENT INSTRUCTIONS
What is skin cancer?  Skin cancer is unfortunately very common. That's why we are here to help you on your journey to healthy happy skin! There are two main types of skin cancer: melanoma and non-melanoma skin cancer. Melanoma is a form of skin cancer that often arises within an existing nevus or mole. However, this is not always the case. Melanoma can arise anywhere (not only where you have moles right now). Melanoma can run in families, so letting us know about your family history is important. Non-melanoma skin cancer is the most common type of cancer in the [place]. The two main types of non-melanoma skin cancers are basal cell carcinomas (BCC) and squamous cell carcinoma (SCC). These cancers tend to be less aggressive than melanomas but are still important to look for and treat.    What can I do to prevent skin cancer?  One of the largest risk factors for skin cancer is sun exposure or UV radiation. Therefore, sun protection is love! Here are some great tips for protecting yourself!  Try to avoid direct sun exposure during peak sun hours (10 AM to 2 PM)  Remember you get A LOT of sun even under cloud coverage and through care windows!  When choosing a sunscreen, look for one that says “broad spectrum” sunscreen. This means it protects you from more of the harmful UV rays.   Choose a sunscreen that is SPF 30 or greater for best protection.   Apply sunscreen to all sun-exposed skin and reapply every 2 hours.   Consider sun protective clothing! Great additions to your sun protective clothing wardrobe include broad brimmed hats, sunglasses, UPF clothing.  Avoid tanning salons. These have been shown to be very harmful in terms of your risk of skin cancer.   Avoid “base tans”. We now know that tans are dangerous (not just sun burns). If you want to have a tan for a trip, consider a spray tan!    Should I check my skin at home between my dermatology appointments?  Yes! It's always a great idea to look at your  skin on a regular basis. Here are some things to look for when monitoring your skin.   For melanoma, look for the ABCDE's!  A = Asymmetry. Look for a spot where one half does not match the other!  B = Borders. Look for a spot that has jagged, ragged or irregular borders.  C = Color. Look for a spot that is not evenly colored and often includes multiple colors, especially true black, red, white, blue, grey.   D = Diameter. Look for a spot that is larger than the size of a pencil eraser.  E = Evolution. If you ever have a spot that is changing in shape, color, size or symptoms (becomes itchy, painful or starts to bleed), always call us!  For non-melanoma skin cancers, look for a new, pink spot that is not going away, especially one that is itchy, painful or bleeding.     What should I do if I see a spot that is concerning for melanoma or non-melanoma skin cancer?  If you are ever concerned, call us! Do not wait for your next appointment. We want to help!

## 2024-04-08 NOTE — PROGRESS NOTES
"[facility] Dermatology Clinic Note     Patient Name: Sobeida Little  Encounter Date: 04/08/24     Have you been cared for by a [facility] Dermatologist in the last 3 years and, if so, which description applies to you?    Yes.  I have been here within the last 3 years, and my medical history has NOT changed since that time.  I am FEMALE/of child-bearing potential.    REVIEW OF SYSTEMS:  Have you recently had or currently have any of the following? No changes in my recent health.   PAST MEDICAL HISTORY:  Have you personally ever had or currently have any of the following?  If \"YES,\" then please provide more detail. No changes in my medical history.   HISTORY OF IMMUNOSUPPRESSION: Do you have a history of any of the following:  Systemic Immunosuppression such as Diabetes, Biologic or Immunotherapy, Chemotherapy, Organ Transplantation, Bone Marrow Transplantation?  No     Answering \"YES\" requires the addition of the dotphrase \"IMMUNOSUPPRESSED\" as the first diagnosis of the patient's visit.   FAMILY HISTORY:  Any \"first degree relatives\" (parent, brother, sister, or child) with the following?    No changes in my family's known health.   PATIENT EXPERIENCE:    Do you want the Dermatologist to perform a COMPLETE skin exam today including a clinical examination under the \"bra and underwear\" areas?  NO  If necessary, do we have your permission to call and leave a detailed message on your Preferred Phone number that includes your specific medical information?  Yes      No Known Allergies   Current Outpatient Medications:     albuterol (Ventolin HFA) 90 mcg/act inhaler, Inhale 2 puffs every 6 (six) hours as needed for wheezing (Patient taking differently: Inhale 2 puffs as needed for wheezing), Disp: 1 Inhaler, Rfl: 1    benzonatate (TESSALON) 200 MG capsule, Take 1 capsule (200 mg total) by mouth 3 (three) times a day as needed for cough (Patient not taking: Reported on 1/22/2024), Disp: 20 capsule, Rfl: 0    " clindamycin-benzoyl peroxide (BENZACLIN) gel, Apply to face twice daily for acne (Patient not taking: Reported on 10/19/2023), Disp: 25 g, Rfl: 1    etonogestrel-ethinyl estradiol (EluRyng) 0.12-0.015 MG/24HR vaginal ring, INSERT 1 RING VAGINALLY AS DIRECTED. REMOVE AFTER 3 WEEKS & WAIT 7 DAYS BEFORE INSERTING A NEW RING, Disp: 3 each, Rfl: 4    hydrocortisone 2.5 % ointment, Apply topically 2 (two) times a day (Patient not taking: Reported on 12/30/2023), Disp: 30 g, Rfl: 2    loratadine (CLARITIN) 10 mg tablet, Take 1 tablet (10 mg total) by mouth in the morning. (Patient taking differently: Take 10 mg by mouth as needed), Disp: 30 tablet, Rfl: 3    Magnesium Gluconate (MAG-G PO), Take by mouth, Disp: , Rfl:     multivitamin (THERAGRAN) TABS, Take 1 tablet by mouth daily, Disp: , Rfl:     phentermine (ADIPEX-P) 37.5 MG tablet, Take 1 tablet (37.5 mg total) by mouth in the morning, Disp: 30 tablet, Rfl: 1    pimecrolimus (ELIDEL) 1 % cream, Apply topically 2 (two) times a day To upper eyelids (Patient taking differently: Apply topically as needed To upper eyelids), Disp: 30 g, Rfl: 3    topiramate (TOPAMAX) 25 mg tablet, TAKE 1 TABLET BY MOUTH 2 TIMES A DAY  DOSES, Disp: 180 tablet, Rfl: 0          Whom besides the patient is providing clinical information about today's encounter?   NO ADDITIONAL HISTORIAN (patient alone provided history)    Physical Exam and Assessment/Plan by Diagnosis:      Neoplasm of Uncertain Behavior- Favors nevus  Physical Exam:  Anatomic Location Affected:  left mid back  Morphological Description:  0.8cm x 0.5cm pigmented papule; DDX: benign nevus, rule out atypia       Additional History of Present Condition:  Patient states she has had this mole for years but noticed in August it became more raised. It itches from time to time as it sits where the band of her bra sits. She denies a personal or family history of melanoma.     Assessment and Plan:  Based on a thorough discussion of  "this condition and the management approach to it (including a comprehensive discussion of the known risks, side effects and potential benefits of treatment), the patient (family) agrees to implement the following specific plan:  Monitor for changes. Handout on ABCDE's of melanoma provided.   Recommend shave biopsy. Patient will schedule for later time as she is currently self pay and is waiting to get on her husbands health insurance.   Measurements and photographs taken in office today for monitoring.         MELANOCYTIC NEVI (\"Moles\")    Physical Exam:  Anatomic Location Affected:   Mostly on sun-exposed areas of the trunk and upper extremities   Morphological Description:  Scattered, 1-4mm round to ovoid, symmetrical-appearing, even bordered, skin colored to dark brown macules/papules      Additional History of Present Condition:  Present on exam. Denies any pain, itch, bleeding. Present for years. Denies actively changing or growing moles.     Assessment and Plan:  Based on a thorough discussion of this condition and the management approach to it (including a comprehensive discussion of the known risks, side effects and potential benefits of treatment), the patient (family) agrees to implement the following specific plan:  Reassured benign.   Monitor for changes. ABCDE's of melanoma handout provided.   Practice sun protection. Apply broad spectrum (UVA and UVB) sunscreen, SPF 30 or higher every 2 hours. Wear sun protective clothing, hats, and sunglasses.       LENTIGO    Physical Exam:  Anatomic Location Affected:  sun exposed areas of face, trunk, and upper extremities   Morphological Description:  multiple scattered tan to brown evenly pigmented macules      Additional History of Present Condition:  Present on exam.     Assessment and Plan:  Based on a thorough discussion of this condition and the management approach to it (including a comprehensive discussion of the known risks, side effects and potential " benefits of treatment), the patient (family) agrees to implement the following specific plan:  Reassured benign.   Practice sun protection. Apply broad spectrum (UVA and UVB) sunscreen, SPF 30 or higher every 2 hours. Wear sun protective clothing, hats, and sunglasses.     Scribe Attestation      I,:  Yaya Santamaria am acting as a scribe while in the presence of the attending physician.:       I,:  Diana Holder PA-C personally performed the services described in this documentation    as scribed in my presence.:

## 2024-04-15 ENCOUNTER — TELEPHONE (OUTPATIENT)
Age: 29
End: 2024-04-15

## 2024-04-15 DIAGNOSIS — E66.9 OBESITY, CLASS I, BMI 30-34.9: Primary | ICD-10-CM

## 2024-04-17 ENCOUNTER — OFFICE VISIT (OUTPATIENT)
Dept: FAMILY MEDICINE CLINIC | Facility: CLINIC | Age: 29
End: 2024-04-17
Payer: COMMERCIAL

## 2024-04-17 ENCOUNTER — TELEPHONE (OUTPATIENT)
Age: 29
End: 2024-04-17

## 2024-04-17 VITALS
RESPIRATION RATE: 16 BRPM | HEIGHT: 63 IN | BODY MASS INDEX: 31.54 KG/M2 | WEIGHT: 178 LBS | OXYGEN SATURATION: 100 % | SYSTOLIC BLOOD PRESSURE: 138 MMHG | DIASTOLIC BLOOD PRESSURE: 80 MMHG | HEART RATE: 101 BPM | TEMPERATURE: 99.9 F

## 2024-04-17 DIAGNOSIS — J30.1 SEASONAL ALLERGIC RHINITIS DUE TO POLLEN: Primary | ICD-10-CM

## 2024-04-17 DIAGNOSIS — E28.2 PCOS (POLYCYSTIC OVARIAN SYNDROME): ICD-10-CM

## 2024-04-17 DIAGNOSIS — E66.09 CLASS 1 OBESITY DUE TO EXCESS CALORIES WITH SERIOUS COMORBIDITY AND BODY MASS INDEX (BMI) OF 31.0 TO 31.9 IN ADULT: ICD-10-CM

## 2024-04-17 PROBLEM — J06.9 UPPER RESPIRATORY TRACT INFECTION: Status: RESOLVED | Noted: 2023-07-18 | Resolved: 2024-04-17

## 2024-04-17 PROBLEM — R51.9 ACUTE NONINTRACTABLE HEADACHE: Status: RESOLVED | Noted: 2022-05-12 | Resolved: 2024-04-17

## 2024-04-17 PROBLEM — B34.9 VIRAL INFECTION, UNSPECIFIED: Status: RESOLVED | Noted: 2022-07-27 | Resolved: 2024-04-17

## 2024-04-17 PROCEDURE — 99214 OFFICE O/P EST MOD 30 MIN: CPT | Performed by: FAMILY MEDICINE

## 2024-04-17 RX ORDER — FEXOFENADINE HCL 180 MG/1
180 TABLET ORAL DAILY
Qty: 30 TABLET | Refills: 5 | Status: SHIPPED | OUTPATIENT
Start: 2024-04-17

## 2024-04-17 NOTE — PROGRESS NOTES
Assessment/Plan:    1. Seasonal allergic rhinitis due to pollen  -     fexofenadine (ALLEGRA) 180 MG tablet; Take 1 tablet (180 mg total) by mouth daily    2. PCOS (polycystic ovarian syndrome)  Assessment & Plan:  Was on wegovy  Trying to see if can get it again through weight management      3. Class 1 obesity due to excess calories with serious comorbidity and body mass index (BMI) of 31.0 to 31.9 in adult  Assessment & Plan:  Seeing weight management  Discussed possible zepbound coupon          Depression Screening and Follow-up Plan: Patient was screened for depression during today's encounter. They screened negative with a PHQ-2 score of 0.        There are no Patient Instructions on file for this visit.    Return if symptoms worsen or fail to improve.    Subjective:      Patient ID: Sobeida Little is a 28 y.o. female.    Chief Complaint   Patient presents with   • Fatigue     Been fatigued 2-3 days   • Allergies     Pt being seen for allergies..   • post nasal drip     2- 3 days        1 month history of gaining weight, fatigue, acne flaring  Gaining weight 15 pound from January  Was on wegovy  Lapse in insurance  Diet hasn't changed        Fatigue  This is a chronic problem. The current episode started more than 1 month ago. The problem occurs constantly. Associated symptoms include congestion, coughing, fatigue, headaches and a sore throat. Pertinent negatives include no abdominal pain, neck pain, swollen glands or vomiting.   Sore Throat   This is a new problem. The current episode started yesterday. The problem has been unchanged. Neither side of throat is experiencing more pain than the other. There has been no fever. The pain is at a severity of 3/10. Associated symptoms include congestion, coughing and headaches. Pertinent negatives include no abdominal pain, diarrhea, drooling, ear discharge, ear pain, hoarse voice, plugged ear sensation, neck pain, shortness of breath, stridor, swollen glands,  trouble swallowing or vomiting. She has had no exposure to strep or mono.       The following portions of the patient's history were reviewed and updated as appropriate:  past social history    Review of Systems   Constitutional:  Positive for fatigue.   HENT:  Positive for congestion and sore throat. Negative for drooling, ear discharge, ear pain, hoarse voice and trouble swallowing.    Eyes:  Positive for itching.   Respiratory:  Positive for cough. Negative for shortness of breath and stridor.    Cardiovascular: Negative.    Gastrointestinal:  Negative for abdominal pain, diarrhea and vomiting.   Musculoskeletal:  Negative for neck pain.   Neurological:  Positive for headaches.         Current Outpatient Medications   Medication Sig Dispense Refill   • albuterol (Ventolin HFA) 90 mcg/act inhaler Inhale 2 puffs every 6 (six) hours as needed for wheezing (Patient taking differently: Inhale 2 puffs as needed for wheezing) 1 Inhaler 1   • etonogestrel-ethinyl estradiol (EluRyng) 0.12-0.015 MG/24HR vaginal ring INSERT 1 RING VAGINALLY AS DIRECTED. REMOVE AFTER 3 WEEKS & WAIT 7 DAYS BEFORE INSERTING A NEW RING 3 each 4   • fexofenadine (ALLEGRA) 180 MG tablet Take 1 tablet (180 mg total) by mouth daily 30 tablet 5   • Magnesium Gluconate (MAG-G PO) Take by mouth     • multivitamin (THERAGRAN) TABS Take 1 tablet by mouth daily     • pimecrolimus (ELIDEL) 1 % cream Apply topically 2 (two) times a day To upper eyelids (Patient taking differently: Apply topically as needed To upper eyelids) 30 g 3   • Semaglutide-Weight Management (WEGOVY) 0.25 MG/0.5ML Inject 0.5 mL (0.25 mg total) under the skin once a week 2 mL 0   • hydrocortisone 2.5 % ointment Apply topically 2 (two) times a day (Patient not taking: Reported on 4/17/2024) 30 g 2     No current facility-administered medications for this visit.       Objective:    /80 (BP Location: Left leg, Patient Position: Sitting, Cuff Size: Standard)   Pulse 101   Temp  "99.9 °F (37.7 °C) (Tympanic)   Resp 16   Ht 5' 3\" (1.6 m)   Wt 80.7 kg (178 lb)   SpO2 100%   BMI 31.53 kg/m²      Physical Exam  Vitals and nursing note reviewed.   Constitutional:       Appearance: She is well-developed.   HENT:      Head: Normocephalic and atraumatic.      Right Ear: Tympanic membrane, ear canal and external ear normal.      Left Ear: Tympanic membrane, ear canal and external ear normal.      Nose: Nose normal.      Mouth/Throat:      Pharynx: Posterior oropharyngeal erythema (pnd) present.   Eyes:      General: Lids are normal.      Conjunctiva/sclera: Conjunctivae normal.      Pupils: Pupils are equal, round, and reactive to light.   Cardiovascular:      Rate and Rhythm: Normal rate and regular rhythm.      Heart sounds: Normal heart sounds, S1 normal and S2 normal.   Pulmonary:      Effort: Pulmonary effort is normal.      Breath sounds: Normal breath sounds.   Abdominal:      General: Bowel sounds are normal.      Palpations: Abdomen is soft.   Musculoskeletal:         General: Normal range of motion.      Cervical back: Normal range of motion and neck supple.   Skin:     General: Skin is warm and dry.   Neurological:      Mental Status: She is alert and oriented to person, place, and time.      Deep Tendon Reflexes: Reflexes are normal and symmetric.   Psychiatric:         Speech: Speech normal.         Behavior: Behavior normal.         Thought Content: Thought content normal.         Judgment: Judgment normal.             Tracy Richter DO  "

## 2024-04-24 DIAGNOSIS — E66.09 CLASS 1 OBESITY DUE TO EXCESS CALORIES WITH SERIOUS COMORBIDITY AND BODY MASS INDEX (BMI) OF 31.0 TO 31.9 IN ADULT: Primary | ICD-10-CM

## 2024-04-24 RX ORDER — TIRZEPATIDE 2.5 MG/.5ML
2.5 INJECTION, SOLUTION SUBCUTANEOUS WEEKLY
Qty: 2 ML | Refills: 0 | Status: SHIPPED | OUTPATIENT
Start: 2024-04-24 | End: 2024-05-22

## 2024-04-26 ENCOUNTER — OFFICE VISIT (OUTPATIENT)
Dept: FAMILY MEDICINE CLINIC | Facility: CLINIC | Age: 29
End: 2024-04-26
Payer: COMMERCIAL

## 2024-04-26 VITALS
HEIGHT: 63 IN | TEMPERATURE: 96.9 F | HEART RATE: 68 BPM | OXYGEN SATURATION: 99 % | BODY MASS INDEX: 31.93 KG/M2 | RESPIRATION RATE: 16 BRPM | WEIGHT: 180.2 LBS | SYSTOLIC BLOOD PRESSURE: 130 MMHG | DIASTOLIC BLOOD PRESSURE: 80 MMHG

## 2024-04-26 DIAGNOSIS — E28.2 PCOS (POLYCYSTIC OVARIAN SYNDROME): ICD-10-CM

## 2024-04-26 DIAGNOSIS — R20.2 BILATERAL LEG PARESTHESIA: ICD-10-CM

## 2024-04-26 DIAGNOSIS — Z31.69 ENCOUNTER FOR PRECONCEPTION CONSULTATION: ICD-10-CM

## 2024-04-26 DIAGNOSIS — Z00.00 ENCOUNTER FOR ANNUAL PHYSICAL EXAM: Primary | ICD-10-CM

## 2024-04-26 DIAGNOSIS — R79.89 LOW VITAMIN B12 LEVEL: ICD-10-CM

## 2024-04-26 PROCEDURE — 99395 PREV VISIT EST AGE 18-39: CPT | Performed by: FAMILY MEDICINE

## 2024-04-26 NOTE — PROGRESS NOTES
ADULT ANNUAL PHYSICAL  [facility]    NAME: Sobeida Little  AGE: 28 y.o. SEX: female  : 1995     DATE: 2024     Assessment and Plan:     Problem List Items Addressed This Visit       PCOS (polycystic ovarian syndrome)  Is stopping birth control. Working on the weight. Was on wegovy but recently switched to zepbound due to cost. Provided info on non pharmacologic supplements     Relevant Orders    Iron Panel (Includes Ferritin, Iron Sat%, Iron, and TIBC)    Vitamin B12    Folate    Low vitamin B12 level  Recheck levels     Relevant Orders    Iron Panel (Includes Ferritin, Iron Sat%, Iron, and TIBC)    Vitamin B12    Folate    Bilateral leg paresthesia  Noted in both legs. Possibly vitiamin deficiency or anemia w. possibly malabsoprtion s/p bariatric surgery. Labs ordered   Recheck levels     Relevant Orders    Iron Panel (Includes Ferritin, Iron Sat%, Iron, and TIBC)    Vitamin B12    Folate     Other Visit Diagnoses       Encounter for annual physical exam    -  Primary    UTD for age appropriate screening    Relevant Orders    Iron Panel (Includes Ferritin, Iron Sat%, Iron, and TIBC)    Vitamin B12    Folate    Encounter for preconception consultation        Has stopped using Nuvaring with plans to start a family. Reccomend starting prenatals now as this has helped to reduce morning sickness in 1st trimester.            Immunizations and preventive care screenings were discussed with patient today. Appropriate education was printed on patient's after visit summary.    Counseling:  Dental Health: discussed importance of regular tooth brushing, flossing, and dental visits.  Exercise: the importance of regular exercise/physical activity was discussed. Recommend exercise 3-5 times per week for at least 30 minutes.          No follow-ups on file.     Chief Complaint:     Chief Complaint   Patient presents with    Physical Exam      History of  Present Illness:     Adult Annual Physical   Patient here for a comprehensive physical exam. The patient reports  weight gain .  Was going on Wegovy. Recently switched to Zepbound on Wednesday due to cost  Now paying 550$ out of pocket   Gained 25 after stopping Wegovy in Dec   Plans to start inositol for PCOS   Plans to come off the OCP to start a family!  S/p bariatric surgery  Taking Mg glycinate at night and iron every other day for RLS         Diet and Physical Activity  Diet/Nutrition: well balanced diet.   Exercise: walking.      Depression Screening  PHQ-2/9 Depression Screening    Little interest or pleasure in doing things: 0 - not at all  Feeling down, depressed, or hopeless: 0 - not at all       General Health  Sleep:  not sleeping well recently. Prior to that, was sleeping 6-8 hours .   Hearing:  No hearing loss and feels she under water. The ears will feel wet but doesn't appreciate drainage .  Vision: wears glasses and sees a ophthalmologist  Dental:  last Wednesday .       /GYN Health  Follows with gynecology? yes   Last menstrual period: started yesterday  Contraceptive method:  coming off the nuva ring  .  History of STDs?: no.         Review of Systems:     Review of Systems   Past Medical History:     Past Medical History:   Diagnosis Date    Abnormal Pap smear of cervix     2018/2019 - HSIL/CIN2-3    Acne     Asthma     exercise induced asthma/no recent problems    Bariatric surgery status     Chronic thoracic back pain 11/9/2017    Eczema     Exercises 1 to 2 times per week     occas 3x/week    External hemorrhoids     last assessed 8/15/12; resolved 6/13/14    Fatigue     last assessed 4/8/13; resolved 6/13/14    Hospital discharge follow-up 8/19/2019    HPV (human papilloma virus) infection     2019 (+) HRHPV type 16/18 negative    Obesity     PCOS (polycystic ovarian syndrome)     Postsurgical malabsorption       Past Surgical History:     Past Surgical History:   Procedure Laterality Date     EGD      WA BREAST REDUCTION Bilateral 2/22/2018    Procedure: BREAST REDUCTION;  Surgeon: Claudia Rose MD;  Location: [facility];  Service: Plastics    WA LAPS GSTRC RSTRICTIV PX LONGITUDINAL GASTRECTOMY N/A 8/13/2019    Procedure: GASTRECTOMY LAPAROSCOPIC SLEEVE WITH ROBOTICS;  Surgeon: Bryant Figueroa MD;  Location: [facility];  Service: Bariatrics    REDUCTION MAMMAPLASTY      WISDOM TOOTH EXTRACTION        Social History:     Social History     Socioeconomic History    Marital status: Single     Spouse name: None    Number of children: None    Years of education: None    Highest education level: None   Occupational History    None   Tobacco Use    Smoking status: Never     Passive exposure: Never    Smokeless tobacco: Never   Vaping Use    Vaping status: Never Used   Substance and Sexual Activity    Alcohol use: Yes     Comment: social    Drug use: No    Sexual activity: Yes     Partners: Male     Birth control/protection: Ring   Other Topics Concern    None   Social History Narrative    None     Social Determinants of Health     Financial Resource Strain: Not on file   Food Insecurity: Not on file   Transportation Needs: Not on file   Physical Activity: Not on file   Stress: Not on file   Social Connections: Not on file   Intimate Partner Violence: Not on file   Housing Stability: Not on file      Family History:     Family History   Problem Relation Age of Onset    Clotting disorder Mother         with menstration    Diabetes Father     Breast cancer Maternal Aunt     Cancer Maternal Aunt         breast cancer    Hypertension Neg Hx     Heart disease Neg Hx     Thyroid disease Neg Hx     Stroke Neg Hx       Current Medications:     Current Outpatient Medications   Medication Sig Dispense Refill    albuterol (Ventolin HFA) 90 mcg/act inhaler Inhale 2 puffs every 6 (six) hours as needed for wheezing (Patient taking differently: Inhale 2 puffs as needed for wheezing) 1 Inhaler 1    fexofenadine (ALLEGRA) 180 MG  "tablet Take 1 tablet (180 mg total) by mouth daily 30 tablet 5    Magnesium Gluconate (MAG-G PO) Take by mouth      multivitamin (THERAGRAN) TABS Take 1 tablet by mouth daily      pimecrolimus (ELIDEL) 1 % cream Apply topically 2 (two) times a day To upper eyelids (Patient taking differently: Apply topically as needed To upper eyelids) 30 g 3    tirzepatide (Zepbound) 2.5 mg/0.5 mL auto-injector Inject 0.5 mL (2.5 mg total) under the skin once a week for 28 days 2 mL 0    etonogestrel-ethinyl estradiol (EluRyng) 0.12-0.015 MG/24HR vaginal ring INSERT 1 RING VAGINALLY AS DIRECTED. REMOVE AFTER 3 WEEKS & WAIT 7 DAYS BEFORE INSERTING A NEW RING 3 each 4     No current facility-administered medications for this visit.      Allergies:     No Known Allergies   Physical Exam:     /80 (BP Location: Left arm, Patient Position: Sitting, Cuff Size: Adult)   Pulse 68   Temp (!) 96.9 °F (36.1 °C) (Tympanic)   Resp 16   Ht 5' 3\" (1.6 m)   Wt 81.7 kg (180 lb 3.2 oz)   SpO2 99%   BMI 31.92 kg/m²     Physical Exam  Constitutional:       General: She is not in acute distress.     Appearance: Normal appearance. She is not ill-appearing or toxic-appearing.   HENT:      Head: Normocephalic and atraumatic.      Right Ear: Tympanic membrane normal.      Left Ear: Tympanic membrane normal.   Eyes:      Extraocular Movements: Extraocular movements intact.   Cardiovascular:      Rate and Rhythm: Normal rate and regular rhythm.      Heart sounds: No murmur heard.  Pulmonary:      Effort: Pulmonary effort is normal. No respiratory distress.      Breath sounds: Normal breath sounds. No stridor. No wheezing or rales.   Abdominal:      General: There is no distension.      Palpations: There is no mass.      Tenderness: There is abdominal tenderness (mild tenderness). There is no guarding or rebound.      Hernia: No hernia is present.   Musculoskeletal:      Right lower leg: No edema.      Left lower leg: No edema.   Lymphadenopathy: "      Cervical: No cervical adenopathy.   Neurological:      Mental Status: She is alert and oriented to person, place, and time.   Psychiatric:         Behavior: Behavior normal.          MD ANEUDY Castro [facility]

## 2024-04-28 NOTE — PROGRESS NOTES
Assessment/Plan:    Obesity, Class I, BMI 30-34 9  -Patient is pursuing Conservative Program s/p Vertical Ronal Must Dr Morales Angry 8/13/2019    -Initial weight loss goal of 5-10% weight loss for improved health  -Screening labs 5/21/22    Initial:186  Current:187 6  Change+1 6  Goal:    Food log (ie ) www [email or web address] and measure, 1200 calories   Increase to  At least 64oz of water daily  Continue current exercise and increase intensity    Medications  -to wean off topamax  Take 1/2 tablet of topamax 2 times a day for 2 weeks, then 1/2 tablet at night for 1 week  Then stoptopamax contract signed  Negative urine preg in office Patient is aware of the teratogenicity of Topamax and condom use was recommended while on the medication     -to increase phentermine to 37 5 mg  She has been taking for last month and does not see much change  Phentermine has as potential side effects of increased heart rate, increased blood pressure, palpitations, headache, dizziness, insomnia, altered mood, abdominal upset, and dry mouth  Notify the provider with change in mood  ER with SI/HI  Phentermine should be stopped prior to surgery  Notify the provider with any changes in vision  Prior to prescription a query of [place] PDMP was made  There was no abnormalities found that would not prevent authorization on current prescription        To call for follow up after EGD  Diagnoses and all orders for this visit:    Obesity, Class I, BMI 30-34 9  -     phentermine (ADIPEX-P) 37 5 MG tablet; Take 1 tablet (37 5 mg total) by mouth in the morning          Subjective:   Chief Complaint   Patient presents with    Follow-up     [facility] 2 mth fu         Patient ID: Nakul Acosta  is a 27 y o  female with excess weight/obesity here to pursue weight managment  Patient is pursuing Conservative Program      HPI s/p Vertical Ronal Morales Angry 8/13/2019     The phentermine and topamax did decrease her appetite  She did gain weight though    She does have increased appetite around her menses  She is trying to avoid sugar  Wt Readings from Last 10 Encounters:   09/08/22 85 1 kg (187 lb 9 6 oz)   07/27/22 83 9 kg (185 lb)   06/28/22 83 9 kg (185 lb)   05/23/22 83 6 kg (184 lb 6 4 oz)   05/20/22 83 7 kg (184 lb 8 oz)   05/12/22 82 3 kg (181 lb 6 4 oz)   04/05/22 82 8 kg (182 lb 9 6 oz)   02/10/22 80 1 kg (176 lb 9 6 oz)   01/07/22 81 6 kg (179 lb 12 8 oz)   11/11/21 83 4 kg (183 lb 12 8 oz)       Food logging:no   Increased appetite/cravings:  Fruit/Vegetable servings:  Exercise:walking about 3 times a week for 1 hour  Resistance bands 1-2 timesa  Week 15 minute  Hydration:40 oz water    Colonoscopy-Not applicable        The following portions of the patient's history were reviewed and updated as appropriate:   She  has a past medical history of Abnormal Pap smear of cervix, Acne, Asthma, Bariatric surgery status, Chronic thoracic back pain (11/9/2017), Eczema, Exercises 1 to 2 times per week, External hemorrhoids, Fatigue, Hospital discharge follow-up (8/19/2019), HPV (human papilloma virus) infection, Obesity, PCOS (polycystic ovarian syndrome), and Postsurgical malabsorption    She   Patient Active Problem List    Diagnosis Date Noted    Viral infection, unspecified 07/27/2022    Acute nonintractable headache 05/12/2022    Acute non-recurrent maxillary sinusitis 04/05/2022    History of COVID-19 01/06/2021    Menorrhagia with regular cycle 09/29/2020    Obesity, Class I, BMI 30-34 9 09/16/2020    Extrinsic asthma without complication 12/99/1823    Encounter for surgical aftercare following surgery of digestive system 03/16/2020    Low vitamin B12 level 03/16/2020    High blood copper level 03/16/2020    BMI 27 0-27 9,adult 03/16/2020    Postsurgical malabsorption 11/25/2019    Need for vaccination 10/22/2019    Encounter for gynecological examination (general) (routine) without abnormal findings 09/17/2019    Bariatric surgery status 09/10/2019    PCOS (polycystic ovarian syndrome) 07/17/2019    Annual physical exam 04/25/2019    Moderate dysplasia of cervix (SALTY II) 01/31/2019    Hyperinsulinemia 12/19/2018    Acne vulgaris 04/17/2018    Hemorrhoids, external 04/21/2017     She  has a past surgical history that includes pr breast reduction (Bilateral, 2/22/2018); [place] tooth extraction; Reduction mammaplasty; EGD; and pr lap, kyler restrict proc, longitudinal gastrectomy (N/A, 8/13/2019)  Her family history includes Breast cancer in her maternal aunt; Cancer in her maternal aunt; Clotting disorder in her mother; Diabetes in her father  She  reports that she has never smoked  She has never used smokeless tobacco  She reports current alcohol use  She reports that she does not use drugs  Current Outpatient Medications   Medication Sig Dispense Refill    albuterol (Ventolin HFA) 90 mcg/act inhaler Inhale 2 puffs every 6 (six) hours as needed for wheezing 1 Inhaler 1    clindamycin-benzoyl peroxide (BENZACLIN) gel Apply to face twice daily for acne (Patient taking differently: Apply to face prn for acne) 25 g 1    EluRyng 0 12-0 015 MG/24HR vaginal ring INSERT 1 RING VAGINALLY AS DIRECTED  REMOVE AFTER 3 WEEKS & WAIT 7 DAYS BEFORE INSERTING A NEW RING 3 each 3    hydrocortisone 2 5 % ointment Apply topically 2 (two) times a day 30 g 2    loratadine (CLARITIN) 10 mg tablet Take 1 tablet (10 mg total) by mouth in the morning  30 tablet 3    phentermine (ADIPEX-P) 37 5 MG tablet Take 1 tablet (37 5 mg total) by mouth in the morning 30 tablet 2    pimecrolimus (ELIDEL) 1 % cream Apply topically 2 (two) times a day To upper eyelids 30 g 3    spironolactone (ALDACTONE) 50 mg tablet Take 3 tablets (150 mg total) by mouth daily Avoid pregnancy while on medication   90 tablet 2    topiramate (TOPAMAX) 50 MG tablet TAKE 1 TABLET (50 MG TOTAL) BY MOUTH IN THE MORNING AND 1 TABLET (50 MG TOTAL) IN THE EVENING  60 tablet 0     No current facility-administered medications for this visit  Current Outpatient Medications on File Prior to Visit   Medication Sig    albuterol (Ventolin HFA) 90 mcg/act inhaler Inhale 2 puffs every 6 (six) hours as needed for wheezing    clindamycin-benzoyl peroxide (BENZACLIN) gel Apply to face twice daily for acne (Patient taking differently: Apply to face prn for acne)    EluRyng 0 12-0 015 MG/24HR vaginal ring INSERT 1 RING VAGINALLY AS DIRECTED  REMOVE AFTER 3 WEEKS & WAIT 7 DAYS BEFORE INSERTING A NEW RING    hydrocortisone 2 5 % ointment Apply topically 2 (two) times a day    loratadine (CLARITIN) 10 mg tablet Take 1 tablet (10 mg total) by mouth in the morning   pimecrolimus (ELIDEL) 1 % cream Apply topically 2 (two) times a day To upper eyelids    spironolactone (ALDACTONE) 50 mg tablet Take 3 tablets (150 mg total) by mouth daily Avoid pregnancy while on medication   topiramate (TOPAMAX) 50 MG tablet TAKE 1 TABLET (50 MG TOTAL) BY MOUTH IN THE MORNING AND 1 TABLET (50 MG TOTAL) IN THE EVENING   [DISCONTINUED] phentermine 15 MG capsule Take 1 capsule (15 mg total) by mouth every morning     No current facility-administered medications on file prior to visit  She has No Known Allergies       Review of Systems   Constitutional: Negative for chills and fever  Respiratory: Negative for shortness of breath  Cardiovascular: Negative for chest pain and palpitations  Gastrointestinal: Negative for abdominal pain, constipation, diarrhea and vomiting  Genitourinary: Negative for difficulty urinating  Musculoskeletal: Negative for arthralgias and back pain  Skin: Negative for rash  Neurological: Negative for headaches  Psychiatric/Behavioral: Negative for dysphoric mood  The patient is not nervous/anxious          Objective:    /76 (BP Location: Left arm, Patient Position: Sitting, Cuff Size: Standard)   Pulse 77   Resp 14   Ht 5' 3" (1 6 m)   Wt 85 1 kg (187 lb 9 6 oz)   SpO2 99%   BMI 33 23 kg/m²      Physical Exam  Vitals and nursing note reviewed  Constitutional:       General: She is not in acute distress  Appearance: She is well-developed  She is obese  HENT:      Head: Normocephalic and atraumatic  Eyes:      Conjunctiva/sclera: Conjunctivae normal    Neck:      Thyroid: No thyromegaly  Pulmonary:      Effort: Pulmonary effort is normal  No respiratory distress  Skin:     Findings: No rash (visible)  Neurological:      Mental Status: She is alert and oriented to person, place, and time     Psychiatric:         Mood and Affect: Mood normal          Behavior: Behavior normal  No

## 2024-05-07 DIAGNOSIS — E66.09 CLASS 1 OBESITY DUE TO EXCESS CALORIES WITH SERIOUS COMORBIDITY AND BODY MASS INDEX (BMI) OF 31.0 TO 31.9 IN ADULT: Primary | ICD-10-CM

## 2024-05-07 RX ORDER — TIRZEPATIDE 5 MG/.5ML
5 INJECTION, SOLUTION SUBCUTANEOUS WEEKLY
Qty: 2 ML | Refills: 1 | Status: SHIPPED | OUTPATIENT
Start: 2024-05-07 | End: 2024-07-02

## 2024-05-15 ENCOUNTER — APPOINTMENT (OUTPATIENT)
Dept: LAB | Facility: CLINIC | Age: 29
End: 2024-05-15
Payer: COMMERCIAL

## 2024-05-15 DIAGNOSIS — R20.2 BILATERAL LEG PARESTHESIA: ICD-10-CM

## 2024-05-15 DIAGNOSIS — E28.2 PCOS (POLYCYSTIC OVARIAN SYNDROME): ICD-10-CM

## 2024-05-15 DIAGNOSIS — Z00.00 ENCOUNTER FOR ANNUAL PHYSICAL EXAM: ICD-10-CM

## 2024-05-15 DIAGNOSIS — E66.09 CLASS 1 OBESITY DUE TO EXCESS CALORIES WITH SERIOUS COMORBIDITY AND BODY MASS INDEX (BMI) OF 31.0 TO 31.9 IN ADULT: ICD-10-CM

## 2024-05-15 DIAGNOSIS — R79.89 LOW VITAMIN B12 LEVEL: ICD-10-CM

## 2024-05-15 LAB
FERRITIN SERPL-MCNC: 11 NG/ML (ref 11–307)
FOLATE SERPL-MCNC: 18.2 NG/ML
IRON SATN MFR SERPL: 8 % (ref 15–50)
IRON SERPL-MCNC: 31 UG/DL (ref 50–212)
TIBC SERPL-MCNC: 413 UG/DL (ref 250–450)
UIBC SERPL-MCNC: 382 UG/DL (ref 155–355)
VIT B12 SERPL-MCNC: 452 PG/ML (ref 180–914)

## 2024-05-15 PROCEDURE — 82728 ASSAY OF FERRITIN: CPT

## 2024-05-15 PROCEDURE — 82607 VITAMIN B-12: CPT

## 2024-05-15 PROCEDURE — 83550 IRON BINDING TEST: CPT

## 2024-05-15 PROCEDURE — 83540 ASSAY OF IRON: CPT

## 2024-05-15 PROCEDURE — 36415 COLL VENOUS BLD VENIPUNCTURE: CPT

## 2024-05-15 PROCEDURE — 82746 ASSAY OF FOLIC ACID SERUM: CPT

## 2024-05-15 RX ORDER — TIRZEPATIDE 2.5 MG/.5ML
2.5 INJECTION, SOLUTION SUBCUTANEOUS WEEKLY
Qty: 2 ML | Refills: 0 | Status: SHIPPED | OUTPATIENT
Start: 2024-05-15 | End: 2024-06-12

## 2024-05-21 ENCOUNTER — OFFICE VISIT (OUTPATIENT)
Dept: BARIATRICS | Facility: CLINIC | Age: 29
End: 2024-05-21
Payer: COMMERCIAL

## 2024-05-21 VITALS
TEMPERATURE: 97.2 F | BODY MASS INDEX: 31.64 KG/M2 | WEIGHT: 178.6 LBS | SYSTOLIC BLOOD PRESSURE: 127 MMHG | RESPIRATION RATE: 16 BRPM | HEART RATE: 76 BPM | DIASTOLIC BLOOD PRESSURE: 86 MMHG | HEIGHT: 63 IN

## 2024-05-21 DIAGNOSIS — E16.1 HYPERINSULINEMIA: ICD-10-CM

## 2024-05-21 DIAGNOSIS — E28.2 PCOS (POLYCYSTIC OVARIAN SYNDROME): ICD-10-CM

## 2024-05-21 DIAGNOSIS — E66.09 CLASS 1 OBESITY DUE TO EXCESS CALORIES WITH SERIOUS COMORBIDITY AND BODY MASS INDEX (BMI) OF 31.0 TO 31.9 IN ADULT: Primary | ICD-10-CM

## 2024-05-21 PROCEDURE — 99214 OFFICE O/P EST MOD 30 MIN: CPT | Performed by: PHYSICIAN ASSISTANT

## 2024-05-21 RX ORDER — TIRZEPATIDE 5 MG/.5ML
5 INJECTION, SOLUTION SUBCUTANEOUS WEEKLY
Qty: 2 ML | Refills: 1 | Status: SHIPPED | OUTPATIENT
Start: 2024-05-21 | End: 2024-07-16

## 2024-05-21 NOTE — ASSESSMENT & PLAN NOTE
-Patient is pursuing Conservative Program s/p Vertical Sleeve Gastrectomy with Dr. Candido Figueroa on 8/13/2019.   -Initial weight loss goal of 5-10% weight loss for improved health  -Screening labs 10/20/23    Initial:186  Last Visit:163.4  Current:178.6  Change-7.4 lb      Goals:  -continue to concentrate on protein goal and making sure to eat vegetables regularly.   -continue to try to drink at least 64oz of water daily.  -Continue walking regularly for exercise . Recommend 3 days a week at least 30 minutes    Medications  -to continue on zepbound.  I would recommend dose increase when available.  Start weight approximately 182lb  -wegovy was currently stopped due to insurance changes. She did have 14.5% weight loss currently since start at weight of 190.2 on 1/13/23.    -was on topamax and phentermine prior and did not lose appropriate weight  -was on metformin for PCOS and did not help with weight loss and had side effects

## 2024-05-21 NOTE — PROGRESS NOTES
Assessment/Plan:    Hyperinsulinemia  Currently on zepbound which should be improving. Last time was on wegovy and did improve    PCOS (polycystic ovarian syndrome)  Prior improved with GLP-1 and has zepbound    Class 1 obesity with serious comorbidity and body mass index (BMI) of 31.0 to 31.9 in adult  -Patient is pursuing Conservative Program s/p Vertical Sleeve Gastrectomy with Dr. Candido Figueroa on 8/13/2019.   -Initial weight loss goal of 5-10% weight loss for improved health  -Screening labs 10/20/23    Initial:186  Last Visit:163.4  Current:178.6  Change-7.4 lb      Goals:  -continue to concentrate on protein goal and making sure to eat vegetables regularly.   -continue to try to drink at least 64oz of water daily.  -Continue walking regularly for exercise . Recommend 3 days a week at least 30 minutes    Medications  -to continue on zepbound.  I would recommend dose increase when available.  Start weight approximately 182lb  -wegovy was currently stopped due to insurance changes. She did have 14.5% weight loss currently since start at weight of 190.2 on 1/13/23.    -was on topamax and phentermine prior and did not lose appropriate weight  -was on metformin for PCOS and did not help with weight loss and had side effects        Follow up in approximately  5 month  with Non-Surgical Physician/Advanced Practitioner.     Diagnoses and all orders for this visit:    Class 1 obesity due to excess calories with serious comorbidity and body mass index (BMI) of 31.0 to 31.9 in adult  -     tirzepatide (Zepbound) 5 mg/0.5 mL auto-injector; Inject 0.5 mL (5 mg total) under the skin once a week    PCOS (polycystic ovarian syndrome)  -     tirzepatide (Zepbound) 5 mg/0.5 mL auto-injector; Inject 0.5 mL (5 mg total) under the skin once a week    Hyperinsulinemia  -     tirzepatide (Zepbound) 5 mg/0.5 mL auto-injector; Inject 0.5 mL (5 mg total) under the skin once a week          Subjective:   Chief Complaint   Patient presents  with    Follow-up     [facility]        Patient ID: Sobeida Little  is a 28 y.o. female with excess weight/obesity here to pursue weight managment.  Patient is pursuing Conservative Program.     HPI  Here for followup.  Prior on wegovy and did well but insurance changed and no longer covered.  She is taking zepbound 2.5mg right now.  Weight initially was 182lb  She was getting more PCOS symptoms after coming off the wegovy  Wt Readings from Last 10 Encounters:   05/21/24 81 kg (178 lb 9.6 oz)   04/26/24 81.7 kg (180 lb 3.2 oz)   04/17/24 80.7 kg (178 lb)   04/08/24 78.9 kg (174 lb)   01/22/24 74.1 kg (163 lb 6.4 oz)   10/19/23 73.1 kg (161 lb 3.2 oz)   09/07/23 73.8 kg (162 lb 9.6 oz)   08/08/23 74.4 kg (164 lb)   05/25/23 77.7 kg (171 lb 3.2 oz)   04/05/23 80.9 kg (178 lb 6.4 oz)       Food logging:no  Increased appetite/cravings:improved but not as good as control as before  Exercise:walking   Hydration:tries to get at least 40 oz water    Diet recall:  B:oatbar (6gm protein)  L;protein with vegetable or sandwich  S:sometimes nuts or skinny pop or yasso bar  D:vegetable, protein, sometimes starch      The following portions of the patient's history were reviewed and updated as appropriate: She  has a past medical history of Abnormal Pap smear of cervix, Acne, Asthma, Bariatric surgery status, Chronic thoracic back pain (11/9/2017), Eczema, Exercises 1 to 2 times per week, External hemorrhoids, Fatigue, Hospital discharge follow-up (8/19/2019), HPV (human papilloma virus) infection, Obesity, PCOS (polycystic ovarian syndrome), and Postsurgical malabsorption.  She   Patient Active Problem List    Diagnosis Date Noted    Seasonal allergic rhinitis due to pollen 04/17/2024    Bilateral leg paresthesia 10/19/2023    Abnormal uterine bleeding (AUB) 08/08/2023    History of COVID-19 01/06/2021    Menorrhagia with regular cycle 09/29/2020    Class 1 obesity with serious comorbidity and body mass index (BMI) of 31.0  to 31.9 in adult 09/16/2020    Extrinsic asthma without complication 05/18/2020    Encounter for surgical aftercare following surgery of digestive system 03/16/2020    Low vitamin B12 level 03/16/2020    High blood copper level 03/16/2020    Postsurgical malabsorption 11/25/2019    Need for vaccination 10/22/2019    Bariatric surgery status 09/10/2019    PCOS (polycystic ovarian syndrome) 07/17/2019    Annual physical exam 04/25/2019    Moderate dysplasia of cervix (SALTY II) 01/31/2019    Hyperinsulinemia 12/19/2018    Acne vulgaris 04/17/2018    Hemorrhoids, external 04/21/2017     She  has a past surgical history that includes pr breast reduction (Bilateral, 2/22/2018); [place] tooth extraction; Reduction mammaplasty; EGD; and pr laps gstrc rstrictiv px longitudinal gastrectomy (N/A, 8/13/2019).  Her family history includes Breast cancer in her maternal aunt; Cancer in her maternal aunt; Clotting disorder in her mother; Diabetes in her father.  She  reports that she has never smoked. She has never been exposed to tobacco smoke. She has never used smokeless tobacco. She reports current alcohol use. She reports that she does not use drugs.  Current Outpatient Medications   Medication Sig Dispense Refill    albuterol (Ventolin HFA) 90 mcg/act inhaler Inhale 2 puffs every 6 (six) hours as needed for wheezing (Patient taking differently: Inhale 2 puffs as needed for wheezing) 1 Inhaler 1    fexofenadine (ALLEGRA) 180 MG tablet Take 1 tablet (180 mg total) by mouth daily 30 tablet 5    Magnesium Gluconate (MAG-G PO) Take by mouth      multivitamin (THERAGRAN) TABS Take 1 tablet by mouth daily      pimecrolimus (ELIDEL) 1 % cream Apply topically 2 (two) times a day To upper eyelids (Patient taking differently: Apply topically as needed To upper eyelids) 30 g 3    tirzepatide (Zepbound) 2.5 mg/0.5 mL auto-injector Inject 0.5 mL (2.5 mg total) under the skin once a week for 28 days 2 mL 0    tirzepatide (Zepbound) 5 mg/0.5  mL auto-injector Inject 0.5 mL (5 mg total) under the skin once a week 2 mL 1    tirzepatide (Zepbound) 5 mg/0.5 mL auto-injector Inject 0.5 mL (5 mg total) under the skin once a week (Patient not taking: Reported on 5/21/2024) 2 mL 1     No current facility-administered medications for this visit.     Current Outpatient Medications on File Prior to Visit   Medication Sig    albuterol (Ventolin HFA) 90 mcg/act inhaler Inhale 2 puffs every 6 (six) hours as needed for wheezing (Patient taking differently: Inhale 2 puffs as needed for wheezing)    fexofenadine (ALLEGRA) 180 MG tablet Take 1 tablet (180 mg total) by mouth daily    Magnesium Gluconate (MAG-G PO) Take by mouth    multivitamin (THERAGRAN) TABS Take 1 tablet by mouth daily    pimecrolimus (ELIDEL) 1 % cream Apply topically 2 (two) times a day To upper eyelids (Patient taking differently: Apply topically as needed To upper eyelids)    tirzepatide (Zepbound) 2.5 mg/0.5 mL auto-injector Inject 0.5 mL (2.5 mg total) under the skin once a week for 28 days    tirzepatide (Zepbound) 5 mg/0.5 mL auto-injector Inject 0.5 mL (5 mg total) under the skin once a week (Patient not taking: Reported on 5/21/2024)    [DISCONTINUED] etonogestrel-ethinyl estradiol (EluRyng) 0.12-0.015 MG/24HR vaginal ring INSERT 1 RING VAGINALLY AS DIRECTED. REMOVE AFTER 3 WEEKS & WAIT 7 DAYS BEFORE INSERTING A NEW RING     No current facility-administered medications on file prior to visit.     She has No Known Allergies..    Review of Systems   Constitutional:  Negative for fever.   Respiratory:  Negative for shortness of breath.    Cardiovascular:  Negative for chest pain and palpitations.   Gastrointestinal:  Negative for abdominal pain, constipation, diarrhea and vomiting.   Genitourinary:  Negative for difficulty urinating.   Skin:  Negative for rash.   Neurological:  Negative for headaches.   Psychiatric/Behavioral:  Negative for dysphoric mood. The patient is not nervous/anxious.   "      Objective:    /86   Pulse 76   Temp (!) 97.2 °F (36.2 °C)   Resp 16   Ht 5' 3\" (1.6 m)   Wt 81 kg (178 lb 9.6 oz)   BMI 31.64 kg/m²      Physical Exam  Vitals and nursing note reviewed.   Constitutional:       General: She is not in acute distress.     Appearance: She is well-developed. She is obese.   HENT:      Head: Normocephalic and atraumatic.   Eyes:      Conjunctiva/sclera: Conjunctivae normal.   Neck:      Thyroid: No thyromegaly.   Pulmonary:      Effort: Pulmonary effort is normal. No respiratory distress.   Skin:     Findings: No rash (visible).   Neurological:      Mental Status: She is alert and oriented to person, place, and time.   Psychiatric:         Mood and Affect: Mood normal.         Behavior: Behavior normal.        "

## 2024-06-12 DIAGNOSIS — E66.9 OBESITY, CLASS I, BMI 30-34.9: Primary | ICD-10-CM

## 2024-06-12 RX ORDER — TIRZEPATIDE 7.5 MG/.5ML
7.5 INJECTION, SOLUTION SUBCUTANEOUS WEEKLY
Qty: 2 ML | Refills: 0 | Status: SHIPPED | OUTPATIENT
Start: 2024-06-12 | End: 2024-07-10

## 2024-07-11 DIAGNOSIS — E66.9 OBESITY, CLASS I, BMI 30-34.9: Primary | ICD-10-CM

## 2024-07-11 RX ORDER — TIRZEPATIDE 10 MG/.5ML
10 INJECTION, SOLUTION SUBCUTANEOUS WEEKLY
Qty: 2 ML | Refills: 1 | Status: SHIPPED | OUTPATIENT
Start: 2024-07-11 | End: 2024-09-05

## 2024-08-21 DIAGNOSIS — E66.9 OBESITY, CLASS I, BMI 30-34.9: ICD-10-CM

## 2024-08-21 RX ORDER — TIRZEPATIDE 10 MG/.5ML
10 INJECTION, SOLUTION SUBCUTANEOUS WEEKLY
Qty: 2 ML | Refills: 1 | Status: SHIPPED | OUTPATIENT
Start: 2024-08-21 | End: 2024-10-16

## 2024-09-04 ENCOUNTER — TELEPHONE (OUTPATIENT)
Age: 29
End: 2024-09-04

## 2024-09-09 ENCOUNTER — CLINICAL SUPPORT (OUTPATIENT)
Dept: FAMILY MEDICINE CLINIC | Facility: CLINIC | Age: 29
End: 2024-09-09
Payer: COMMERCIAL

## 2024-09-09 DIAGNOSIS — Z11.1 SCREENING FOR TUBERCULOSIS: Primary | ICD-10-CM

## 2024-09-09 PROCEDURE — 86580 TB INTRADERMAL TEST: CPT

## 2024-09-09 NOTE — PROGRESS NOTES
Assessment/Plan:      Diagnoses and all orders for this visit:    Screening for tuberculosis  -     TB Skin Test          Subjective:     Patient ID: Sobeida Little is a 28 y.o. female.          Objective:      There were no vitals taken for this visit.

## 2024-09-11 ENCOUNTER — CLINICAL SUPPORT (OUTPATIENT)
Dept: FAMILY MEDICINE CLINIC | Facility: CLINIC | Age: 29
End: 2024-09-11

## 2024-09-11 DIAGNOSIS — Z11.1 ENCOUNTER FOR PPD SKIN TEST READING: Primary | ICD-10-CM

## 2024-09-11 LAB
INDURATION: 0 MM
TB SKIN TEST: NEGATIVE

## 2024-09-11 NOTE — PROGRESS NOTES
Assessment/Plan:      Diagnoses and all orders for this visit:    Encounter for PPD skin test reading          Subjective:     Patient ID: Sobeida Little is a 28 y.o. female.          Objective:      There were no vitals taken for this visit.

## 2024-10-24 DIAGNOSIS — E66.811 OBESITY, CLASS I, BMI 30-34.9: Primary | ICD-10-CM

## 2024-10-24 RX ORDER — TIRZEPATIDE 12.5 MG/.5ML
12.5 INJECTION, SOLUTION SUBCUTANEOUS WEEKLY
Qty: 2 ML | Refills: 1 | Status: SHIPPED | OUTPATIENT
Start: 2024-10-24 | End: 2024-12-19

## 2024-10-30 ENCOUNTER — OFFICE VISIT (OUTPATIENT)
Dept: BARIATRICS | Facility: CLINIC | Age: 29
End: 2024-10-30
Payer: COMMERCIAL

## 2024-10-30 VITALS
RESPIRATION RATE: 17 BRPM | HEART RATE: 73 BPM | SYSTOLIC BLOOD PRESSURE: 98 MMHG | TEMPERATURE: 97.3 F | WEIGHT: 169 LBS | DIASTOLIC BLOOD PRESSURE: 75 MMHG | HEIGHT: 63 IN | BODY MASS INDEX: 29.95 KG/M2

## 2024-10-30 DIAGNOSIS — E66.811 CLASS 1 OBESITY DUE TO EXCESS CALORIES WITH SERIOUS COMORBIDITY AND BODY MASS INDEX (BMI) OF 31.0 TO 31.9 IN ADULT: Primary | ICD-10-CM

## 2024-10-30 DIAGNOSIS — E66.09 CLASS 1 OBESITY DUE TO EXCESS CALORIES WITH SERIOUS COMORBIDITY AND BODY MASS INDEX (BMI) OF 31.0 TO 31.9 IN ADULT: Primary | ICD-10-CM

## 2024-10-30 DIAGNOSIS — E28.2 PCOS (POLYCYSTIC OVARIAN SYNDROME): ICD-10-CM

## 2024-10-30 DIAGNOSIS — E16.1 HYPERINSULINEMIA: ICD-10-CM

## 2024-10-30 DIAGNOSIS — E66.3 OVERWEIGHT: ICD-10-CM

## 2024-10-30 PROCEDURE — 99214 OFFICE O/P EST MOD 30 MIN: CPT | Performed by: PHYSICIAN ASSISTANT

## 2024-10-30 RX ORDER — TIRZEPATIDE 15 MG/.5ML
INJECTION, SOLUTION SUBCUTANEOUS
Qty: 2 ML | Refills: 3 | Status: SHIPPED | OUTPATIENT
Start: 2024-10-30

## 2024-10-30 NOTE — PROGRESS NOTES
Assessment/Plan:    Overweight  -Patient is pursuing Conservative Program s/p Vertical Sleeve Gastrectomy with Dr. Candido Figueroa on 8/13/2019.   -Initial weight loss goal of 5-10% weight loss for improved health  -Screening labs 10/20/23    Initial:186  Last Visit:178.6  Current:169  Change-17 lb      Goals:  -continue to concentrate on protein goal and making sure to eat vegetables regularly.   -continue to try to drink at least 64oz of water daily.  -Continue walking regularly for exercise . Recommend 3 days a week at least 30 minutes    Medications  -ton zepbound.on 12.5mg currently.  Paying out of pocket. Will try to switch to mounjaro due to PCOS to see if it does get covered. It has helped with blood sugar, insulin resistance and acne. Start weight approximately 182lb.7.1 % weight loss. [place] slower results due to prior being on wegovy.  She did start that medication at 190lb  -Past Medications: topamax and phentermine prior and did not lose appropriate weight, metformin for PCOS and did not help with weight loss and had side effects    Hyperinsulinemia  To switch from zepbound to mounjaro    PCOS (polycystic ovarian syndrome)  Had been on metformin prior with GI side effects and will switch from zepbound to mounjaro        Return in about 6 months (around 4/30/2025) for with Cathryn.     Diagnoses and all orders for this visit:    Class 1 obesity due to excess calories with serious comorbidity and body mass index (BMI) of 31.0 to 31.9 in adult  -     Tirzepatide (Mounjaro) 15 MG/0.5ML SOAJ; Inject 0.5ml once a week    PCOS (polycystic ovarian syndrome)  -     Tirzepatide (Mounjaro) 15 MG/0.5ML SOAJ; Inject 0.5ml once a week    Hyperinsulinemia    Overweight          Subjective:   Chief Complaint   Patient presents with    Follow-up     MWM-5M F/u; Waist-30.5in        Patient ID: Sobeida Little  is a 28 y.o. female with excess weight/obesity here to pursue weight managment.  Patient is pursuing Conservative  Program.     HPIs/p Vertical Sleeve Gastrectomy with Dr. Candido Figueroa on 8/13/2019.    On zepbound 12.5mg .She did first injection of zepbound right now.  When last seen was on 2.5mg of zepbound.  She was on wegovy prior.  Insurance is not covering AOM and she is paying herself.    Wt Readings from Last 10 Encounters:   10/30/24 76.7 kg (169 lb)   05/21/24 81 kg (178 lb 9.6 oz)   04/26/24 81.7 kg (180 lb 3.2 oz)   04/17/24 80.7 kg (178 lb)   04/08/24 78.9 kg (174 lb)   01/22/24 74.1 kg (163 lb 6.4 oz)   10/19/23 73.1 kg (161 lb 3.2 oz)   09/07/23 73.8 kg (162 lb 9.6 oz)   08/08/23 74.4 kg (164 lb)   05/25/23 77.7 kg (171 lb 3.2 oz)       Food logging:  Increased appetite/cravings:  Exercise:walking    Hydration:at least 40 oz water      The following portions of the patient's history were reviewed and updated as appropriate: She  has a past medical history of Abnormal Pap smear of cervix, Acne, Asthma, Bariatric surgery status, Chronic thoracic back pain (11/9/2017), Eczema, Exercises 1 to 2 times per week, External hemorrhoids, Fatigue, Hospital discharge follow-up (8/19/2019), HPV (human papilloma virus) infection, Obesity, PCOS (polycystic ovarian syndrome), and Postsurgical malabsorption.  She   Patient Active Problem List    Diagnosis Date Noted    Seasonal allergic rhinitis due to pollen 04/17/2024    Bilateral leg paresthesia 10/19/2023    Abnormal uterine bleeding (AUB) 08/08/2023    History of COVID-19 01/06/2021    Menorrhagia with regular cycle 09/29/2020    Overweight 09/16/2020    Extrinsic asthma without complication 05/18/2020    Encounter for surgical aftercare following surgery of digestive system 03/16/2020    Low vitamin B12 level 03/16/2020    High blood copper level 03/16/2020    Postsurgical malabsorption 11/25/2019    Need for vaccination 10/22/2019    Bariatric surgery status 09/10/2019    PCOS (polycystic ovarian syndrome) 07/17/2019    Annual physical exam 04/25/2019    Moderate dysplasia of  cervix (SALTY II) 01/31/2019    Hyperinsulinemia 12/19/2018    Acne vulgaris 04/17/2018    Hemorrhoids, external 04/21/2017     She  has a past surgical history that includes pr breast reduction (Bilateral, 2/22/2018); [place] tooth extraction; Reduction mammaplasty; EGD; and pr laps gstrc rstrictiv px longitudinal gastrectomy (N/A, 8/13/2019).  Her family history includes Breast cancer in her maternal aunt; Cancer in her maternal aunt; Clotting disorder in her mother; Diabetes in her father.  She  reports that she has never smoked. She has never been exposed to tobacco smoke. She has never used smokeless tobacco. She reports current alcohol use. She reports that she does not use drugs.  Current Outpatient Medications   Medication Sig Dispense Refill    albuterol (Ventolin HFA) 90 mcg/act inhaler Inhale 2 puffs every 6 (six) hours as needed for wheezing (Patient taking differently: Inhale 2 puffs as needed for wheezing) 1 Inhaler 1    fexofenadine (ALLEGRA) 180 MG tablet Take 1 tablet (180 mg total) by mouth daily 30 tablet 5    Magnesium Gluconate (MAG-G PO) Take by mouth      multivitamin (THERAGRAN) TABS Take 1 tablet by mouth daily      Tirzepatide (Mounjaro) 15 MG/0.5ML SOAJ Inject 0.5ml once a week 2 mL 3    pimecrolimus (ELIDEL) 1 % cream Apply topically 2 (two) times a day To upper eyelids (Patient not taking: Reported on 10/30/2024) 30 g 3     No current facility-administered medications for this visit.     Current Outpatient Medications on File Prior to Visit   Medication Sig    albuterol (Ventolin HFA) 90 mcg/act inhaler Inhale 2 puffs every 6 (six) hours as needed for wheezing (Patient taking differently: Inhale 2 puffs as needed for wheezing)    fexofenadine (ALLEGRA) 180 MG tablet Take 1 tablet (180 mg total) by mouth daily    Magnesium Gluconate (MAG-G PO) Take by mouth    multivitamin (THERAGRAN) TABS Take 1 tablet by mouth daily    [DISCONTINUED] tirzepatide (Zepbound) 12.5 mg/0.5 mL auto-injector  "Inject 0.5 mL (12.5 mg total) under the skin once a week    pimecrolimus (ELIDEL) 1 % cream Apply topically 2 (two) times a day To upper eyelids (Patient not taking: Reported on 10/30/2024)     No current facility-administered medications on file prior to visit.     She has No Known Allergies..    Review of Systems   Constitutional:  Negative for fever.   Respiratory:  Negative for shortness of breath.    Cardiovascular:  Negative for chest pain and palpitations.   Gastrointestinal:  Negative for abdominal pain, constipation, diarrhea and vomiting.   Genitourinary:  Negative for difficulty urinating.   Skin:  Negative for rash.   Neurological:  Negative for headaches.   Psychiatric/Behavioral:  Negative for dysphoric mood. The patient is not nervous/anxious.        Objective:    BP 98/75   Pulse 73   Temp (!) 97.3 °F (36.3 °C)   Resp 17   Ht 5' 3\" (1.6 m)   Wt 76.7 kg (169 lb)   BMI 29.94 kg/m²      Physical Exam  Vitals and nursing note reviewed.   Constitutional:       General: She is not in acute distress.     Appearance: She is well-developed.      Comments: Overweight     HENT:      Head: Normocephalic and atraumatic.   Eyes:      Conjunctiva/sclera: Conjunctivae normal.   Neck:      Thyroid: No thyromegaly.   Pulmonary:      Effort: Pulmonary effort is normal. No respiratory distress.   Skin:     Findings: No rash (visible).   Neurological:      Mental Status: She is alert and oriented to person, place, and time.   Psychiatric:         Behavior: Behavior normal.        "

## 2024-10-30 NOTE — ASSESSMENT & PLAN NOTE
-Patient is pursuing Conservative Program s/p Vertical Sleeve Gastrectomy with Dr. Candido Figueroa on 8/13/2019.   -Initial weight loss goal of 5-10% weight loss for improved health  -Screening labs 10/20/23    Initial:186  Last Visit:178.6  Current:169  Change-17 lb      Goals:  -continue to concentrate on protein goal and making sure to eat vegetables regularly.   -continue to try to drink at least 64oz of water daily.  -Continue walking regularly for exercise . Recommend 3 days a week at least 30 minutes    Medications  -ton zepbound.on 12.5mg currently.  Paying out of pocket. Will try to switch to mounjaro due to PCOS to see if it does get covered. It has helped with blood sugar, insulin resistance and acne. Start weight approximately 182lb.7.1 % weight loss. Sanjan[place] slower results due to prior being on wegovy.  She did start that medication at 190lb  -Past Medications: topamax and phentermine prior and did not lose appropriate weight, metformin for PCOS and did not help with weight loss and had side effects

## 2024-10-30 NOTE — Clinical Note
I told her it would not likely go through but she wanted to switch from zepbound (paying 550 a month ) to mounjaro. She has PCOS and wanted to see if they would cover it.  She has 3 month injections of zepbound right now if we can try

## 2024-11-07 ENCOUNTER — TELEPHONE (OUTPATIENT)
Dept: BARIATRICS | Facility: CLINIC | Age: 29
End: 2024-11-07

## 2024-11-07 NOTE — TELEPHONE ENCOUNTER
PA for Mounjaro 15 mg  DENIED    Reason:(Screenshot if applicable)Phone PA done with [employer] Rep- Al B- medication denied due to the patient not having a diagnosis of Type 2 diabetes- letter scanned into media        Message sent to office clinical pool No      Denial letter scanned into Media Yes    Appeal started No (Provider will need to decide if appeal is warranted and send clinical documentation to Prior Authorization Team for initiation.)    **Please follow up with your patient regarding denial and next steps**

## 2024-11-07 NOTE — TELEPHONE ENCOUNTER
PA for Mounjaro 150 mg SUBMITTED to [employer]    via    []CMM-KEY:    [][employer]cripts-Case ID #    []Availity-Auth ID #  NDC #    []Faxed to plan   []Other website    [x]Phone call Case ID # [id number]    []PA sent as URGENT    All office notes, labs and other pertaining documents and studies sent. Clinical questions answered. Awaiting determination from insurance company.     Turnaround time for your insurance to make a decision on your Prior Authorization can take 7-21 business days.

## 2024-11-11 DIAGNOSIS — E66.09 CLASS 1 OBESITY DUE TO EXCESS CALORIES WITH SERIOUS COMORBIDITY AND BODY MASS INDEX (BMI) OF 31.0 TO 31.9 IN ADULT: Primary | ICD-10-CM

## 2024-11-11 DIAGNOSIS — E66.811 CLASS 1 OBESITY DUE TO EXCESS CALORIES WITH SERIOUS COMORBIDITY AND BODY MASS INDEX (BMI) OF 31.0 TO 31.9 IN ADULT: Primary | ICD-10-CM

## 2024-11-11 RX ORDER — TIRZEPATIDE 15 MG/.5ML
15 INJECTION, SOLUTION SUBCUTANEOUS WEEKLY
Qty: 2 ML | Refills: 3 | Status: SHIPPED | OUTPATIENT
Start: 2024-11-11 | End: 2025-03-03

## 2024-11-26 ENCOUNTER — IMMUNIZATIONS (OUTPATIENT)
Dept: FAMILY MEDICINE CLINIC | Facility: CLINIC | Age: 29
End: 2024-11-26
Payer: COMMERCIAL

## 2024-11-26 DIAGNOSIS — Z23 ENCOUNTER FOR IMMUNIZATION: Primary | ICD-10-CM

## 2024-11-26 PROCEDURE — 90656 IIV3 VACC NO PRSV 0.5 ML IM: CPT

## 2024-11-26 PROCEDURE — 90471 IMMUNIZATION ADMIN: CPT

## 2024-12-10 DIAGNOSIS — E28.2 PCOS (POLYCYSTIC OVARIAN SYNDROME): ICD-10-CM

## 2024-12-10 DIAGNOSIS — E66.811 CLASS 1 OBESITY DUE TO EXCESS CALORIES WITH SERIOUS COMORBIDITY AND BODY MASS INDEX (BMI) OF 31.0 TO 31.9 IN ADULT: ICD-10-CM

## 2024-12-10 DIAGNOSIS — E66.09 CLASS 1 OBESITY DUE TO EXCESS CALORIES WITH SERIOUS COMORBIDITY AND BODY MASS INDEX (BMI) OF 31.0 TO 31.9 IN ADULT: ICD-10-CM

## 2024-12-10 RX ORDER — TIRZEPATIDE 15 MG/.5ML
INJECTION, SOLUTION SUBCUTANEOUS
Qty: 2 ML | Refills: 3 | Status: SHIPPED | OUTPATIENT
Start: 2024-12-10 | End: 2024-12-17

## 2024-12-17 DIAGNOSIS — E66.811 CLASS 1 OBESITY DUE TO EXCESS CALORIES WITH SERIOUS COMORBIDITY AND BODY MASS INDEX (BMI) OF 31.0 TO 31.9 IN ADULT: ICD-10-CM

## 2024-12-17 DIAGNOSIS — E66.09 CLASS 1 OBESITY DUE TO EXCESS CALORIES WITH SERIOUS COMORBIDITY AND BODY MASS INDEX (BMI) OF 31.0 TO 31.9 IN ADULT: ICD-10-CM

## 2024-12-17 RX ORDER — TIRZEPATIDE 15 MG/.5ML
15 INJECTION, SOLUTION SUBCUTANEOUS WEEKLY
Qty: 2 ML | Refills: 3 | Status: SHIPPED | OUTPATIENT
Start: 2024-12-17 | End: 2025-04-08

## 2025-01-15 ENCOUNTER — TELEPHONE (OUTPATIENT)
Age: 30
End: 2025-01-15

## 2025-01-15 NOTE — TELEPHONE ENCOUNTER
OB patient, newly pregnant, LMP 12/12 approx 4w6d requesting beta hcg for peace of mind. Already has her D&V scheduled. No concerns.     Informed patient we usually do not order the beta hcg testing unless there is a concern, however I will send message to the provider. Did inform patient, she should check with her insurance regarding coverage.

## 2025-02-09 NOTE — PROGRESS NOTES
"S: Sobeida is a 29 y.o.   who presents for viability scan with LMP of 24.     She is 8 weeks and 4 days by her LMP, with GUSTAVO: 2025   Cycles regular and she is certain of her LMP.    This is a welcomed pregnancy and is her first pregnancy.    Past Medical History:   Diagnosis Date    Abnormal Pap smear of cervix      - HSIL/CIN2-3    Acne     Asthma     exercise induced asthma/no recent problems    Bariatric surgery status     Chronic thoracic back pain 2017    Eczema     Exercises 1 to 2 times per week     occas 3x/week    External hemorrhoids     last assessed 8/15/12; resolved 14    Fatigue     last assessed 13; resolved 14    Hospital discharge follow-up 2019    HPV (human papilloma virus) infection     2019 (+) HRHPV type 16/18 negative    Obesity     PCOS (polycystic ovarian syndrome)     Postsurgical malabsorption        OB History    Para Term  AB Living   1 0 0 0 0 0   SAB IAB Ectopic Multiple Live Births   0 0 0 0 0      # Outcome Date GA Lbr Luis Enrique/2nd Weight Sex Type Anes PTL Lv   1 Current                 O:  Vitals:    02/10/25 0916   BP: 122/82   BP Location: Left arm   Patient Position: Sitting   Cuff Size: Standard   Weight: 80.4 kg (177 lb 3.2 oz)   Height: 5' 3\" (1.6 m)         TVUS: Single intrauterine gestation, CRL 8w1d, and .   Consistent with dating via LMP, will maintain GUSTAVO. 2025.                     A/P:  #1. IUP at 8 weeks and 4 days  Viable pregnancy on TVUS  Maternal fetal medicine referral placed today  RTO in 1-2 weeks for nurse intake visit  RTO in 4 weeks for NOB/part 2    Problem List Items Addressed This Visit    None  Visit Diagnoses         Encounter for pregnancy test, result positive    -  Primary    Relevant Orders    AMB US OB < 14 weeks single or first gestation level 1    Ambulatory Referral to Maternal Fetal Medicine      8 weeks gestation of pregnancy        Relevant Orders    Ambulatory Referral to " Maternal Fetal Medicine      History of abnormal cervical Pap smear                Rachell Harvey MD   02/10/25   1:41 PM

## 2025-02-10 ENCOUNTER — ULTRASOUND (OUTPATIENT)
Dept: OBGYN CLINIC | Facility: CLINIC | Age: 30
End: 2025-02-10
Payer: COMMERCIAL

## 2025-02-10 VITALS
WEIGHT: 177.2 LBS | DIASTOLIC BLOOD PRESSURE: 82 MMHG | BODY MASS INDEX: 31.4 KG/M2 | HEIGHT: 63 IN | SYSTOLIC BLOOD PRESSURE: 122 MMHG

## 2025-02-10 DIAGNOSIS — Z87.42 HISTORY OF ABNORMAL CERVICAL PAP SMEAR: ICD-10-CM

## 2025-02-10 DIAGNOSIS — Z3A.08 8 WEEKS GESTATION OF PREGNANCY: ICD-10-CM

## 2025-02-10 DIAGNOSIS — Z32.01 ENCOUNTER FOR PREGNANCY TEST, RESULT POSITIVE: Primary | ICD-10-CM

## 2025-02-10 PROCEDURE — 76817 TRANSVAGINAL US OBSTETRIC: CPT | Performed by: STUDENT IN AN ORGANIZED HEALTH CARE EDUCATION/TRAINING PROGRAM

## 2025-02-10 PROCEDURE — 99213 OFFICE O/P EST LOW 20 MIN: CPT | Performed by: STUDENT IN AN ORGANIZED HEALTH CARE EDUCATION/TRAINING PROGRAM

## 2025-02-27 ENCOUNTER — TELEPHONE (OUTPATIENT)
Age: 30
End: 2025-02-27

## 2025-02-27 ENCOUNTER — HOSPITAL ENCOUNTER (EMERGENCY)
Facility: HOSPITAL | Age: 30
Discharge: HOME | End: 2025-02-27
Attending: EMERGENCY MEDICINE
Payer: COMMERCIAL

## 2025-02-27 VITALS
OXYGEN SATURATION: 99 % | TEMPERATURE: 98 F | RESPIRATION RATE: 17 BRPM | SYSTOLIC BLOOD PRESSURE: 112 MMHG | DIASTOLIC BLOOD PRESSURE: 76 MMHG | HEART RATE: 67 BPM

## 2025-02-27 DIAGNOSIS — E87.1 HYPONATREMIA: ICD-10-CM

## 2025-02-27 DIAGNOSIS — R42 LIGHTHEADED: ICD-10-CM

## 2025-02-27 DIAGNOSIS — R51.9 ACUTE HEADACHE: Primary | ICD-10-CM

## 2025-02-27 LAB
ALBUMIN SERPL BCG-MCNC: 3.8 G/DL (ref 3.5–5)
ALP SERPL-CCNC: 61 U/L (ref 34–104)
ALT SERPL W P-5'-P-CCNC: 9 U/L (ref 7–52)
ANION GAP SERPL CALCULATED.3IONS-SCNC: 7 MMOL/L (ref 4–13)
AST SERPL W P-5'-P-CCNC: 12 U/L (ref 13–39)
ATRIAL RATE: 62 BPM
BASOPHILS # BLD AUTO: 0.04 THOUSANDS/ÂΜL (ref 0–0.1)
BASOPHILS NFR BLD AUTO: 0 % (ref 0–1)
BILIRUB SERPL-MCNC: 0.29 MG/DL (ref 0.2–1)
BUN SERPL-MCNC: 12 MG/DL (ref 5–25)
CALCIUM SERPL-MCNC: 8.4 MG/DL (ref 8.4–10.2)
CHLORIDE SERPL-SCNC: 103 MMOL/L (ref 96–108)
CO2 SERPL-SCNC: 23 MMOL/L (ref 21–32)
CREAT SERPL-MCNC: 0.49 MG/DL (ref 0.6–1.3)
EOSINOPHIL # BLD AUTO: 0.17 THOUSAND/ÂΜL (ref 0–0.61)
EOSINOPHIL NFR BLD AUTO: 2 % (ref 0–6)
ERYTHROCYTE [DISTWIDTH] IN BLOOD BY AUTOMATED COUNT: 12.2 % (ref 11.6–15.1)
GFR SERPL CREATININE-BSD FRML MDRD: 130 ML/MIN/1.73SQ M
GLUCOSE SERPL-MCNC: 115 MG/DL (ref 65–140)
HCT VFR BLD AUTO: 37.1 % (ref 34.8–46.1)
HGB BLD-MCNC: 12.1 G/DL (ref 11.5–15.4)
IMM GRANULOCYTES # BLD AUTO: 0.03 THOUSAND/UL (ref 0–0.2)
IMM GRANULOCYTES NFR BLD AUTO: 0 % (ref 0–2)
LYMPHOCYTES # BLD AUTO: 2.33 THOUSANDS/ÂΜL (ref 0.6–4.47)
LYMPHOCYTES NFR BLD AUTO: 22 % (ref 14–44)
MCH RBC QN AUTO: 30.3 PG (ref 26.8–34.3)
MCHC RBC AUTO-ENTMCNC: 32.6 G/DL (ref 31.4–37.4)
MCV RBC AUTO: 93 FL (ref 82–98)
MONOCYTES # BLD AUTO: 0.42 THOUSAND/ÂΜL (ref 0.17–1.22)
MONOCYTES NFR BLD AUTO: 4 % (ref 4–12)
NEUTROPHILS # BLD AUTO: 7.78 THOUSANDS/ÂΜL (ref 1.85–7.62)
NEUTS SEG NFR BLD AUTO: 72 % (ref 43–75)
NRBC BLD AUTO-RTO: 0 /100 WBCS
P AXIS: 35 DEGREES
PLATELET # BLD AUTO: 228 THOUSANDS/UL (ref 149–390)
PMV BLD AUTO: 10.4 FL (ref 8.9–12.7)
POTASSIUM SERPL-SCNC: 3.9 MMOL/L (ref 3.5–5.3)
PR INTERVAL: 142 MS
PROT SERPL-MCNC: 6.5 G/DL (ref 6.4–8.4)
QRS AXIS: 37 DEGREES
QRSD INTERVAL: 78 MS
QT INTERVAL: 386 MS
QTC INTERVAL: 391 MS
RBC # BLD AUTO: 3.99 MILLION/UL (ref 3.81–5.12)
SODIUM SERPL-SCNC: 133 MMOL/L (ref 135–147)
T WAVE AXIS: 10 DEGREES
VENTRICULAR RATE: 62 BPM
WBC # BLD AUTO: 10.77 THOUSAND/UL (ref 4.31–10.16)

## 2025-02-27 PROCEDURE — 36415 COLL VENOUS BLD VENIPUNCTURE: CPT | Performed by: EMERGENCY MEDICINE

## 2025-02-27 PROCEDURE — 93010 ELECTROCARDIOGRAM REPORT: CPT | Performed by: INTERNAL MEDICINE

## 2025-02-27 PROCEDURE — 99284 EMERGENCY DEPT VISIT MOD MDM: CPT | Performed by: EMERGENCY MEDICINE

## 2025-02-27 PROCEDURE — 76815 OB US LIMITED FETUS(S): CPT | Performed by: EMERGENCY MEDICINE

## 2025-02-27 PROCEDURE — 80053 COMPREHEN METABOLIC PANEL: CPT | Performed by: EMERGENCY MEDICINE

## 2025-02-27 PROCEDURE — 93005 ELECTROCARDIOGRAM TRACING: CPT

## 2025-02-27 PROCEDURE — 85025 COMPLETE CBC W/AUTO DIFF WBC: CPT | Performed by: EMERGENCY MEDICINE

## 2025-02-27 PROCEDURE — 99284 EMERGENCY DEPT VISIT MOD MDM: CPT

## 2025-02-27 NOTE — ED PROVIDER NOTES
Time reflects when diagnosis was documented in both MDM as applicable and the Disposition within this note       Time User Action Codes Description Comment    2/27/2025 12:13 PM Lewis Burgess Add [R51.9] Acute headache     2/27/2025 12:13 PM Lewis Burgess Add [E87.1] Hyponatremia     2/27/2025 12:13 PM Lewis Burgess Add [R42] Lightheaded           ED Disposition       ED Disposition   Discharge    Condition   Stable    Date/Time   Thu Feb 27, 2025 12:16 PM    Comment   Sobeida Little discharge to home/self care.                   Assessment & Plan       Medical Decision Making  Patient is a 29-year-old female, with a history significant for confirmed IUP on ultrasound at 11 weeks gestation per my review the medical record, who presents to the ED today for evaluation of elevated blood pressure with a diastolic of 104 measured by automated cuff at a school patient was at.  Patient states that she took her blood pressure due to gradual onset, mild headache (atraumatic, described as similar to prior headaches) as well as mild lightheadedness.  Patient has not taken anything to remit her symptoms and she declines analgesia at this time.  Patient states she was in her usual state of health until around 930 this morning.  She denies fever, chest pain, dyspnea, decreased urine output, urinary symptoms, weakness, numbness, vision change, vaginal bleeding, vaginal discharge.  Patient states that she called her OB/GYN who advised her to present to the emergency department.  This pregnancy is not the result of IVF or fertility medication.  Patient is currently afebrile and hemodynamically stable.  Physical exam is notable for no focal neurodeficit, clear heart and lungs, soft nontender abdomen, fetal heart tones 161 on ultrasound.  This presentation is concerning for: Primary headache, electrolyte abnormality, anemia, erroneous blood pressure measurement prehospital with uncomplicated machine.  No  reason to suspect hypertensive emergency, SAH, meningitis, preeclampsia based upon history/physical exam.  Will investigate with CBC, CMP, ECG.  Will manage based upon workup    Amount and/or Complexity of Data Reviewed  Labs: ordered. Decision-making details documented in ED Course.        ED Course as of 02/27/25 1222   Thu Feb 27, 2025   1153 Hemoglobin: 12.1  WNL   1213 Sodium(!): 133  Low    1215 ECG per my independent interpretation: Normal rate, regular rhythm, no ectopy, normal axis, no ST elevations or depressions.  QRS and QTc within normal limits   1216 Patient tolerating p.o.   1221 Patient eager to go home on reevaluation.  Results reviewed with patient questions answered to her satisfaction.  Patient expressed understanding of need for close follow-up.  Patient appears well on reevaluation       Medications - No data to display    ED Risk Strat Scores                                                History of Present Illness       Chief Complaint   Patient presents with    Hypertension - Pregnant     Pt reports 2 high blood pressure readings this morning of 120 and 104 systolic. Reports slight h/a, denies blurry vision. 11 weeks pregnant tomorrow       Past Medical History:   Diagnosis Date    Abnormal Pap smear of cervix     2018/2019 - HSIL/CIN2-3    Acne     Asthma     exercise induced asthma/no recent problems    Bariatric surgery status     Chronic thoracic back pain 11/9/2017    Eczema     Exercises 1 to 2 times per week     occas 3x/week    External hemorrhoids     last assessed 8/15/12; resolved 6/13/14    Fatigue     last assessed 4/8/13; resolved 6/13/14    Hospital discharge follow-up 8/19/2019    HPV (human papilloma virus) infection     2019 (+) HRHPV type 16/18 negative    Obesity     PCOS (polycystic ovarian syndrome)     Postsurgical malabsorption       Past Surgical History:   Procedure Laterality Date    EGD      SC BREAST REDUCTION Bilateral 2/22/2018    Procedure: BREAST REDUCTION;   Surgeon: Claudia Rose MD;  Location: [facility];  Service: Plastics    WI LAPS [facility] RSTRICTIV PX LONGITUDINAL GASTRECTOMY N/A 8/13/2019    Procedure: GASTRECTOMY LAPAROSCOPIC SLEEVE WITH ROBOTICS;  Surgeon: Bryant Figueroa MD;  Location: [facility];  Service: Bariatrics    REDUCTION MAMMAPLASTY      WISDOM TOOTH EXTRACTION        Family History   Problem Relation Age of Onset    Clotting disorder Mother         with menstration    Diabetes Father     Breast cancer Maternal Aunt     Cancer Maternal Aunt         breast cancer    Hypertension Neg Hx     Heart disease Neg Hx     Thyroid disease Neg Hx     Stroke Neg Hx       Social History     Tobacco Use    Smoking status: Never     Passive exposure: Never    Smokeless tobacco: Never   Vaping Use    Vaping status: Never Used   Substance Use Topics    Alcohol use: Not Currently     Comment: social    Drug use: No      E-Cigarette/Vaping    E-Cigarette Use Never User       E-Cigarette/Vaping Substances    Nicotine No     THC No     CBD No     Flavoring No     Other No     Unknown No       I have reviewed and agree with the history as documented.     Patient is a 29-year-old female, with a history significant for confirmed IUP on ultrasound at 11 weeks gestation per my review the medical record, who presents to the ED today for evaluation of elevated blood pressure with a diastolic of 104 measured by automated cuff at a school patient was at.  Patient states that she took her blood pressure due to gradual onset, mild headache (atraumatic, described as similar to prior headaches) as well as mild lightheadedness.  Patient has not taken anything to remit her symptoms and she declines analgesia at this time.  Patient states she was in her usual state of health until around 930 this morning.  She denies fever, chest pain, dyspnea, decreased urine output, urinary symptoms, weakness, numbness, vision change, vaginal bleeding, vaginal discharge.  Patient states that she called her  OB/GYN who advised her to present to the emergency department.  Patient is without other concerns at this time.        Review of Systems   Constitutional:  Negative for fever.   HENT:  Negative for trouble swallowing.    Eyes:  Negative for visual disturbance.   Respiratory:  Negative for shortness of breath.    Cardiovascular:  Negative for chest pain.   Gastrointestinal:  Negative for abdominal pain.   Endocrine: Negative for polyuria.   Genitourinary:  Negative for dysuria.   Musculoskeletal:  Negative for gait problem.   Skin:  Negative for rash.   Allergic/Immunologic: Negative for environmental allergies.   Neurological:  Positive for light-headedness and headaches. Negative for weakness and numbness.   Hematological:  Negative for adenopathy.   Psychiatric/Behavioral:  Negative for confusion.    All other systems reviewed and are negative.          Objective       ED Triage Vitals   Temperature Pulse Blood Pressure Respirations SpO2 Patient Position - Orthostatic VS   02/27/25 1107 02/27/25 1106 02/27/25 1107 02/27/25 1106 02/27/25 1106 02/27/25 1106   98 °F (36.7 °C) 70 123/70 16 100 % Sitting      Temp Source Heart Rate Source BP Location FiO2 (%) Pain Score    02/27/25 1107 02/27/25 1106 02/27/25 1106 -- --    Oral Monitor Right arm        Vitals      Date and Time Temp Pulse SpO2 Resp BP Pain Score FACES Pain Rating User   02/27/25 1213 -- -- -- -- 112/76 -- -- TERESA   02/27/25 1200 -- 67 99 % 17 112/76 -- -- KB   02/27/25 1107 98 °F (36.7 °C) -- -- -- 123/70 -- -- RI   02/27/25 1106 -- 70 100 % 16 -- -- -- RI            Physical Exam  Vitals and nursing note reviewed.   Constitutional:       General: She is not in acute distress.     Appearance: She is not ill-appearing, toxic-appearing or diaphoretic.      Comments: Patient appears comfortable during my evaluation    HENT:      Head: Normocephalic and atraumatic.      Right Ear: External ear normal.      Left Ear: External ear normal.      Nose: Nose  normal. No rhinorrhea.      Mouth/Throat:      Mouth: Mucous membranes are moist.      Pharynx: Oropharynx is clear. No oropharyngeal exudate or posterior oropharyngeal erythema.      Comments: Uvula midline. No oropharyngeal or submandibular mass/swelling  Eyes:      General: No scleral icterus.        Right eye: No discharge.         Left eye: No discharge.      Conjunctiva/sclera: Conjunctivae normal.      Pupils: Pupils are equal, round, and reactive to light.   Cardiovascular:      Rate and Rhythm: Normal rate and regular rhythm.      Pulses: Normal pulses.      Heart sounds: Normal heart sounds. No murmur heard.     No friction rub. No gallop.      Comments: 2+ Radial  Pulmonary:      Effort: Pulmonary effort is normal. No respiratory distress.      Breath sounds: Normal breath sounds. No stridor. No wheezing, rhonchi or rales.   Abdominal:      General: Abdomen is flat. There is no distension.      Palpations: Abdomen is soft.      Tenderness: There is no abdominal tenderness. There is no right CVA tenderness, left CVA tenderness, guarding or rebound.   Musculoskeletal:         General: No deformity.      Cervical back: Normal range of motion and neck supple. No rigidity or tenderness. No muscular tenderness.   Lymphadenopathy:      Cervical: No cervical adenopathy.   Skin:     General: Skin is warm and dry.      Capillary Refill: Capillary refill takes less than 2 seconds.   Neurological:      Mental Status: She is alert.      Comments: Cranial nerves 2-12 intact.  5/5 strength in all four extremities including finger extension against resistance.  Sensation to light touch subjectively intact/equal in all four extremities and the face.  Patient able to ambulate without difficulty.    Patient is speaking clearly in complete sentences.  Patient is answering appropriately and able to follow commands.    Psychiatric:         Mood and Affect: Mood normal.         Behavior: Behavior normal.         Results  Reviewed       Procedure Component Value Units Date/Time    Comprehensive metabolic panel [[id number]]  (Abnormal) Collected: 02/27/25 1133    Lab Status: Final result Specimen: Blood from Arm, Right Updated: 02/27/25 1203     Sodium 133 mmol/L      Potassium 3.9 mmol/L      Chloride 103 mmol/L      CO2 23 mmol/L      ANION GAP 7 mmol/L      BUN 12 mg/dL      Creatinine 0.49 mg/dL      Glucose 115 mg/dL      Calcium 8.4 mg/dL      AST 12 U/L      ALT 9 U/L      Alkaline Phosphatase 61 U/L      Total Protein 6.5 g/dL      Albumin 3.8 g/dL      Total Bilirubin 0.29 mg/dL      eGFR 130 ml/min/1.73sq m     Narrative:      National Kidney Disease Foundation guidelines for Chronic Kidney Disease (CKD):     Stage 1 with normal or high GFR (GFR > 90 mL/min/1.73 square meters)    Stage 2 Mild CKD (GFR = 60-89 mL/min/1.73 square meters)    Stage 3A Moderate CKD (GFR = 45-59 mL/min/1.73 square meters)    Stage 3B Moderate CKD (GFR = 30-44 mL/min/1.73 square meters)    Stage 4 Severe CKD (GFR = 15-29 mL/min/1.73 square meters)    Stage 5 End Stage CKD (GFR <15 mL/min/1.73 square meters)  Note: GFR calculation is accurate only with a steady state creatinine    CBC and differential [[id number]]  (Abnormal) Collected: 02/27/25 1133    Lab Status: Final result Specimen: Blood from Arm, Right Updated: 02/27/25 1143     WBC 10.77 Thousand/uL      RBC 3.99 Million/uL      Hemoglobin 12.1 g/dL      Hematocrit 37.1 %      MCV 93 fL      MCH 30.3 pg      MCHC 32.6 g/dL      RDW 12.2 %      MPV 10.4 fL      Platelets 228 Thousands/uL      nRBC 0 /100 WBCs      Segmented % 72 %      Immature Grans % 0 %      Lymphocytes % 22 %      Monocytes % 4 %      Eosinophils Relative 2 %      Basophils Relative 0 %      Absolute Neutrophils 7.78 Thousands/µL      Absolute Immature Grans 0.03 Thousand/uL      Absolute Lymphocytes 2.33 Thousands/µL      Absolute Monocytes 0.42 Thousand/µL      Eosinophils Absolute 0.17 Thousand/µL      Basophils Absolute  0.04 Thousands/µL             No orders to display       POC Pelvic US    Date/Time: 2/27/2025 11:40 AM    Performed by: Lewis Burgess MD  Authorized by: Lewis Burgess MD    Patient location:  ED  Procedure details:     Exam Type:  Diagnostic    Indications: evaluate for IUP      Assessment for: evaluate fetal viability      Technique:  Transabdominal obstetric (HCG+) exam    Views obtained: uterus (transverse and sagittal)      Image quality: diagnostic      Image availability:  Images available in PACS  Uterine findings:     Single gestation: identified      Fetal heart rate: identified      Fetal heart rate (bpm):  161  Interpretation:     Pregnancy findings: intrauterine pregnancy (IUP)        ED Medication and Procedure Management   Prior to Admission Medications   Prescriptions Last Dose Informant Patient Reported? Taking?   Magnesium Gluconate (MAG-G PO)   Yes No   Sig: Take by mouth   Patient not taking: Reported on 2/10/2025   Prenatal MV-Min-Fe Fum-FA-DHA (PRENATAL 1 PO)   Yes No   Sig: Take by mouth   albuterol (Ventolin HFA) 90 mcg/act inhaler  Self No No   Sig: Inhale 2 puffs every 6 (six) hours as needed for wheezing   fexofenadine (ALLEGRA) 180 MG tablet   No No   Sig: Take 1 tablet (180 mg total) by mouth daily   multivitamin (THERAGRAN) TABS   Yes No   Sig: Take 1 tablet by mouth daily   Patient not taking: Reported on 2/10/2025   pimecrolimus (ELIDEL) 1 % cream  Self No No   Sig: Apply topically 2 (two) times a day To upper eyelids   Patient not taking: Reported on 10/30/2024   tirzepatide (Zepbound) 15 mg/0.5 mL auto-injector   No No   Sig: Inject 0.5 mL (15 mg total) under the skin once a week   Patient not taking: Reported on 2/10/2025      Facility-Administered Medications: None     Current Discharge Medication List        CONTINUE these medications which have NOT CHANGED    Details   albuterol (Ventolin HFA) 90 mcg/act inhaler Inhale 2 puffs every 6 (six) hours as needed for  wheezing  Qty: 1 Inhaler, Refills: 1    Comments: Substitution to a formulary equivalent within the same pharmaceutical class is authorized.  Associated Diagnoses: Mild intermittent extrinsic asthma without complication      fexofenadine (ALLEGRA) 180 MG tablet Take 1 tablet (180 mg total) by mouth daily  Qty: 30 tablet, Refills: 5    Associated Diagnoses: Seasonal allergic rhinitis due to pollen      Magnesium Gluconate (MAG-G PO) Take by mouth      multivitamin (THERAGRAN) TABS Take 1 tablet by mouth daily      pimecrolimus (ELIDEL) 1 % cream Apply topically 2 (two) times a day To upper eyelids  Qty: 30 g, Refills: 3    Associated Diagnoses: Atopic dermatitis of eyelid, unspecified laterality      Prenatal MV-Min-Fe Fum-FA-DHA (PRENATAL 1 PO) Take by mouth      tirzepatide (Zepbound) 15 mg/0.5 mL auto-injector Inject 0.5 mL (15 mg total) under the skin once a week  Qty: 2 mL, Refills: 3    Comments: Paying out of pocket  Associated Diagnoses: Class 1 obesity due to excess calories with serious comorbidity and body mass index (BMI) of 31.0 to 31.9 in adult           No discharge procedures on file.  ED SEPSIS DOCUMENTATION   Time reflects when diagnosis was documented in both MDM as applicable and the Disposition within this note       Time User Action Codes Description Comment    2/27/2025 12:13 PM Lewis Burgess [R51.9] Acute headache     2/27/2025 12:13 PM Lewis Burgess Add [E87.1] Hyponatremia     2/27/2025 12:13 PM Lewis Burgess Add [R42] Lightheaded                  Lewis Burgess MD  02/27/25 1222

## 2025-02-27 NOTE — DISCHARGE INSTRUCTIONS
You were evaluated in the emergency department for: headache and blood pressure. You can access your current and pending results through [email or web address]. A radiologist will take a second look at your X-Rays, if you had any, and you will be contacted with any new findings.     You should follow-up with your obstetrician gynecologist and your primary care provider, as soon as possible, for re-evaluation.  If you do not have a primary care provider, I have referred you to [facility]. You will be contacted about scheduling an appointment. Their phone number is also included on this paperwork.     Your workup revealed no emergent features at this time; however, many disease processes are dynamic:    Please, return to the emergency department if you experience new or worsening symptoms, fever, chest pain, shortness of breath, difficulty breathing, dizziness, abdominal pain, persistent nausea/vomiting, syncope or passing out, blood in your urine or stool, coughing up blood, leg swelling/pain, urinary retention, bowel or bladder incontinence, numbness between your legs.    You may take 650mg of tylenol every four to six hours, not exceeding 3,000mg daily, for the management of your discomfort.

## 2025-03-04 NOTE — PROGRESS NOTES
"  OB INTAKE INTERVIEW  Patient is 29 y.o. who presents for OB intake at 11 wks 6 days  She is accompanied by herself during this encounter  The father of her baby (Jaime Bar III \"CJ\") is involved in the pregnancy and is 30 years old.      Last Menstrual Period: 2024  Ultrasound: Measured 8 weeks 1 days on 2/10/2025  Estimated Date of Delivery: 2025 confirmed by US    Signs/Symptoms of Pregnancy  Current pregnancy symptoms: mild nausea, breast tenderness, urinary frequency, fatigue  Constipation YES - increase hydration, fiber, Miralax, Colace prn  Headaches no  Cramping/spotting YES (mild cramping), no vaginal bleeding  PICA cravings no    Diabetes-  There is no height or weight on file to calculate BMI.  If patient has 1 or more, please order early 1 hour GTT  History of GDM no  BMI >35 no  History of PCOS or current metformin use (should stop for 7 days prior to 1hr GTT unless pre-existing diabetes)  YES  History of LGA/macrosomic infant (4000g/9lbs) no    If patient has 2 or more, please order early 1 hour GTT  BMI>30 YES  AMA no  First degree relative with type 2 diabetes YES  History of chronic HTN, hyperlipidemia, elevated A1C no  High risk race (, , ,  or ) YES    Hypertension- if you answer yes to any of the following, please order baseline preeclampsia labs (cbc, comprehensive metabolic panel, urine protein creatinine ratio, uric acid)  History of of chronic HTN no  History of gestational HTN no  History of preeclampsia, eclampsia, or HELLP syndrome no  History of diabetes no  History of lupus,sjogrens syndrome, kidney disease no    Thyroid- if yes order TSH with reflex T4  History of thyroid disease no    Bleeding Disorder or Hx of DVT-patient or first degree relative with history of. Order the following if not done previously.   (Factor V, antithrombin III, prothrombin gene mutation, protein C and S Ag, lupus anticoagulant, " anticardiolipin, beta-2 glycoprotein)   YES - her mom hx protein S deficiency - her mom had placental abruption 3rd pregnancy & Lovenox next pregnancy - patient has not had any testing done    OB/GYN-  History of abnormal pap smear YES - hx pap 2018 - ASCUS, cannot exclude HGSIL - colposcopy 2018 - SALTY 2      Date of last pap smear 2023 (wnl (-) hpv)  History of HPV YES  History of Herpes/HSV no  History of other STI (gonorrhea, chlamydia, trich) no  History of prior  no  History of prior  no  History of  delivery prior to 36 weeks 6 days no  History of Varicella or Vaccination patient had chickenpox vaccine in childhood  History of blood transfusion no  Ok for blood transfusion YES    Substance screening-   History of tobacco use no  Currently using tobacco no  Substance Use Screen Level (N/A, LOW, HIGH) N/A    MRSA Screening-   Does the pt have a hx of MRSA? no    Immunizations:  Influenza vaccine given this season YES  Discussed Tdap vaccine YES  Discussed COVID Vaccine YES - patient had Covid vaccines, patient had Covid x 3    Genetic/MFM-  Do you or your partner have a history of any of the following in yourselves or first degree relatives?  Cystic fibrosis no  Spinal muscular atrophy no  Hemoglobinopathy/Sickle Cell/Thalassemia no  Fragile X Intellectual Disability no    If yes, discuss Carrier Screening and recommend consultation with [facility]/Genetic Counseling and place specific [facility] Referral for.    If no, discuss Carrier Screening being completed once in a lifetime as a standard of care lab test. Place orders for Cystic Fibrosis Gene Test ([id number]) and Spinal Muscular Atrophy DNA ([id number])  - discussed & info given - ordered & will await prior auth    Appointment for Nuchal Translucency Ultrasound at [facility] scheduled for 3/17/2025 - discussed NIPT/MSAFP      Interview education  [facility] P[facility] Essentials Book reviewed, discussed and attached to their AVS YES    Nurse/Family Partnership-  patient may qualify; referral placed NO    Prenatal lab work scripts YES ([facility])  Extra labs ordered:  Hgb fractionation cascade, CMP, bariatric labs   1 hr glucose, thrombophilia labs (added these orders & patient aware 3/7/2025)    Aspirin/Preeclampsia Screen    Risk Level Risk Factor Recommendation   LOW Prior Uncomplicated full-term delivery no No Aspirin recommendation        MODERATE Nulliparity YES Recommend low-dose aspirin if     BMI>30 YES 2 or more moderate risk factors    Family History Preeclampsia (mother/sister) no     35yr old or greater no     Black Race, Concern for SDOH/Low Socioeconomic no     IVF Pregnancy  no     Personal History Risks (low birth weight, prior adverse preg outcome, >10yr preg interval) no         HIGH History of Preeclampsia no Recommend low-dose aspirin if     Multifetal gestation no 1 or more high risk factors    Chronic HTN no     Type 1 or 2 Diabetes no     Renal Disease no     Autoimmune Disease  no      Contraindications to ASA therapy:  NSAID/ ASA allergy: no  Nasal polyps: no  Asthma with history of ASA induced bronchospasm: no  Relative contraindications:  History of GI bleed: no  Active peptic ulcer disease: no  Severe hepatic dysfunction: no    Patient should be recommended to take ASA 162mg during this pregnancy from 12-36wks to lower her risk of preeclampsia: discussed LDASA from wk 12-36 - will follow up with provider next OB appointment 3/10/2025      The patient has a history now or in prior pregnancy notable for:    - unplanned, welcomed pregnancy   - previously on Nuvaring - d/c 4/2024  - hx bariatric sleeve surgery (2019) - patient prefers not to be disclosed if  present  - seen in ED 2/27/2025 for h/a/elevated BP - BP in ED higher than normal for her per patient - sometimes monitors BP @ home - advised to keep BP log until next appt  - hx abn pap ASCUS, cannot r/o HGSIL - had colposcopy w/ path SALTY 2 - prev followed with Isai Miller q 6 month pap x  2 yrs  - hx PCOS  - hx bilateral breast reduction    Details that I feel the provider should be aware of:     - no dietary restrictions  - patient is up to date on q 6 month dental cleanings  - 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 The patient was oriented to our practice, the navigator role, reviewed delivering physicians and [facility] for Delivery. All questions were answered.    Interviewed by: Vinita MILLER RN

## 2025-03-04 NOTE — PATIENT INSTRUCTIONS
Congratulations!! Please review our Pregnancy Essential Guide and [facility] L&D Virtual tour from our networks website.     [facility] Pregnancy Essentials Guide  [facility] ([email or web address])     [facility] - Virtual Tour ([email or web address]) Doug Little,     It was so nice getting to know you today. Remember if you have an urgent or time sensitive concern, please call the practice phone number so a clinical triage team member can review your symptoms and get in touch with our on call provider if necessary. If you have general questions or need help navigating our services please REPLY to this message so it comes directly to me and I will respond between other patients. If I am out of the office for any reason, another nurse navigator may reach out to help you. Our Pregnancy Essential Guide is a great online resource--please use the link below.     [facility] Pregnancy Essentials Guide  [facility] ([email or web address])     Again, Congratulations and Thank You for choosing [facility]. I look forward to helping you through this journey. Reach out -   Vinita MILLER RN    OB Nurse Navigator [facility]

## 2025-03-04 NOTE — PROGRESS NOTES
OB/GYN  PN Visit  Sobeida Little  [id number]  3/10/2025  12:06 PM  Kartik Garvey PA-C    S: 29 y.o.  11w5d here for PN visit. Pregnancy complicated by prior bariatric surgery, primigravida.     OB complaints:  (+) n - mild/occasional; (+) cramping - mild/light; Denies v/HA/vb/LOF/edema/DV/smoking.  She reports feeling a right breast lump - outer, lower quadrant prior to pregnancy - though related to scar tissue s/p breast reduction; feels more prominent now since pregnant and tender if pushes on it    O:    Pre-Mateo Vitals      Flowsheet Row Most Recent Value   Prenatal Assessment    Fetal Heart Rate 168   Prenatal Vitals    Blood Pressure 124/78   Weight - Scale 81.6 kg (180 lb)   Urine Albumin/Glucose    Dilation/Effacement/Station    Vaginal Drainage    Draining Fluid No   Edema    LLE Edema None   RLE Edema None              Gen: no acute distress, nonlabored breathing.  OB exam completed: fundal height, +FHT.  Urine: -/-  NOB part 2 physical exam completed today - appreciated right breast lump - outer, lower quadrant - soft, movable diffuse/not clearly demarcated lump - about 1 cm in size just above scar; B/L breast reduction scars noted - plan R br u/s for eval    A/P:    1. Prenatal care, first trimester  -     Chlamydia trachomatis culture  -     Neisseria gonorrhoeae RNA, TMA  2. Lump in lower outer quadrant of right breast  -     US breast right limited (diagnostic); Future      - Continue PNV  - Labor precautions reviewed;  - Fetal kick counts reviewed - NA  - Labs: encouraged to complete at earliest convenience  -Pap: UTD - 23 - WNL (-) HRHPV; 3/10/25 C/G collected  -Rh: TBD  - Genetics: MFM appt 3/17/25  - Ultrasounds: MFM appt 3/17/25  - Tdap: Will offer after 27w  - Flu Shot: done 10/2024  - RSV: Will offer during season (-) @ [id number]   - Rhogam: TBD  - Delivery: TBD  - Contraception: TBD  - Breastfeeding: TBD  - Pediatrician: TBD  -Delivery Consent & Packet: at 30w  -GBS: at 36  weeks  -RTO 4 weeks    Kartik Garvey PA-C  3/10/2025  12:06 PM

## 2025-03-05 ENCOUNTER — INITIAL PRENATAL (OUTPATIENT)
Dept: OBGYN CLINIC | Facility: CLINIC | Age: 30
End: 2025-03-05

## 2025-03-05 VITALS
DIASTOLIC BLOOD PRESSURE: 76 MMHG | WEIGHT: 179 LBS | HEIGHT: 63 IN | SYSTOLIC BLOOD PRESSURE: 118 MMHG | BODY MASS INDEX: 31.71 KG/M2

## 2025-03-05 DIAGNOSIS — Z31.430 ENCOUNTER OF FEMALE FOR TESTING FOR GENETIC DISEASE CARRIER STATUS FOR PROCREATIVE MANAGEMENT: ICD-10-CM

## 2025-03-05 DIAGNOSIS — Z34.01 ENCOUNTER FOR SUPERVISION OF NORMAL FIRST PREGNANCY IN FIRST TRIMESTER: ICD-10-CM

## 2025-03-05 DIAGNOSIS — E28.2 PCOS (POLYCYSTIC OVARIAN SYNDROME): ICD-10-CM

## 2025-03-05 DIAGNOSIS — Z36.89 ENCOUNTER FOR OTHER SPECIFIED ANTENATAL SCREENING: Primary | ICD-10-CM

## 2025-03-05 DIAGNOSIS — O99.841 BARIATRIC SURGERY STATUS COMPLICATING PREGNANCY, FIRST TRIMESTER: ICD-10-CM

## 2025-03-05 DIAGNOSIS — D56.9 THALASSEMIA, UNSPECIFIED TYPE: ICD-10-CM

## 2025-03-05 PROCEDURE — OBC: Performed by: STUDENT IN AN ORGANIZED HEALTH CARE EDUCATION/TRAINING PROGRAM

## 2025-03-07 ENCOUNTER — TELEPHONE (OUTPATIENT)
Dept: OBGYN CLINIC | Facility: CLINIC | Age: 30
End: 2025-03-07

## 2025-03-07 DIAGNOSIS — Z36.89 ENCOUNTER FOR OTHER SPECIFIED ANTENATAL SCREENING: Primary | ICD-10-CM

## 2025-03-07 DIAGNOSIS — Z83.2 FAMILY HISTORY OF BLEEDING DISORDER IN MOTHER: ICD-10-CM

## 2025-03-07 DIAGNOSIS — Z34.01 ENCOUNTER FOR SUPERVISION OF NORMAL FIRST PREGNANCY IN FIRST TRIMESTER: ICD-10-CM

## 2025-03-07 DIAGNOSIS — Z82.49 FAMILY HISTORY OF DVT: ICD-10-CM

## 2025-03-07 NOTE — TELEPHONE ENCOUNTER
Patient advised of provider's recommendations - patient will do 1 hr glucose & thrombophilia labs - added orders today in patient's chart.

## 2025-03-07 NOTE — TELEPHONE ENCOUNTER
----- Message from Vinita MILLER sent at 3/5/2025  4:48 PM EST -----  Regarding: FW: lab testing    ----- Message -----  From: Rachell Harvey MD  Sent: 3/5/2025   4:23 PM EST  To: Vinita Hardin RN  Subject: RE: lab testing                                  Sometimes with the sleeve they can tolerate 1hr gtt, it's mostly favio en y (gastric bypass) that can't tolerate the 1hr gttt. If she's nervous about trying 1hr, would recommend week of fingersticks.    Protein s level will not be reliable in pregnancy, but can have thrombophilia panel ordered now to assess for other thrombophilia. Can just repeat protein s postpartum if looks low, which is what typically happens in pregnancy.     Thanks!  ----- Message -----  From: Vinita Hardin RN  Sent: 3/5/2025  12:10 PM EST  To: Rachell Harvey MD  Subject: lab testing                                      Hi Dr Harvey,  I saw this patient for her OB intake on 3/5/2025 (11 wk 6 days) - you saw her for her D&V US on 2/10/2025 - had 2 questions - 1st related to glucose testing - hx gastric sleeve & PCOS - I am ordering CMP & HgBA1C so will this suffice for glucose testing?  Wasn't sure if she needed 1 hr glucose (if she can tolerate) or checking blood sugars or if CMP & HgbA1C will suffice.  Also her mom had placenta abruption in 3rd preg & then on Lovenox following pregnancy.  Her mom's testing revealed protein S deficiency.  Patient has not had any thrombophilia labs done previously.  Wasn't sure if she needed thrombophilia panel ordered now.  Thank you  Vinita MILLER

## 2025-03-10 ENCOUNTER — INITIAL PRENATAL (OUTPATIENT)
Dept: OBGYN CLINIC | Facility: CLINIC | Age: 30
End: 2025-03-10

## 2025-03-10 VITALS
WEIGHT: 180 LBS | DIASTOLIC BLOOD PRESSURE: 78 MMHG | SYSTOLIC BLOOD PRESSURE: 124 MMHG | BODY MASS INDEX: 31.89 KG/M2 | HEIGHT: 63 IN

## 2025-03-10 DIAGNOSIS — Z34.91 PRENATAL CARE, FIRST TRIMESTER: Primary | ICD-10-CM

## 2025-03-10 DIAGNOSIS — N63.13 LUMP IN LOWER OUTER QUADRANT OF RIGHT BREAST: ICD-10-CM

## 2025-03-10 PROCEDURE — PNV: Performed by: PHYSICIAN ASSISTANT

## 2025-03-10 NOTE — ASSESSMENT & PLAN NOTE
- Continue PNV  - Labor precautions reviewed;  - Fetal kick counts reviewed - NA  - Labs: encouraged to complete at earliest convenience  -Pap: UTD - 8/8/23 - WNL (-) HRHPV; 3/10/25 C/G collected  -Rh: TBD  - Genetics: [facility] appt 3/17/25  - Ultrasounds: [facility] appt 3/17/25  - Tdap: Will offer after 27w  - Flu Shot: done 10/2024  - RSV: Will offer during season (9/1-1/31) @ [id number]   - Rhogam: TBD  - Delivery: TBD  - Contraception: TBD  - Breastfeeding: TBD  - Pediatrician: TBD  -Delivery Consent & Packet: at 30w  -GBS: at 36 weeks  -RTO 4 weeks

## 2025-03-11 ENCOUNTER — RESULTS FOLLOW-UP (OUTPATIENT)
Dept: OBGYN CLINIC | Facility: CLINIC | Age: 30
End: 2025-03-11

## 2025-03-12 LAB
C TRACH RRNA SPEC QL NAA+PROBE: NOT DETECTED
C TRACH SPEC QL CULT: NORMAL
N GONORRHOEA RRNA SPEC QL NAA+PROBE: NOT DETECTED
REF LAB TEST NAME: NORMAL
REF LAB TEST: NORMAL
SL AMB CLIENT CONTACT: NORMAL
SPECIMEN SOURCE: NORMAL

## 2025-03-17 ENCOUNTER — ROUTINE PRENATAL (OUTPATIENT)
Facility: HOSPITAL | Age: 30
End: 2025-03-17
Attending: STUDENT IN AN ORGANIZED HEALTH CARE EDUCATION/TRAINING PROGRAM
Payer: COMMERCIAL

## 2025-03-17 ENCOUNTER — TELEPHONE (OUTPATIENT)
Dept: OBGYN CLINIC | Facility: CLINIC | Age: 30
End: 2025-03-17

## 2025-03-17 VITALS
DIASTOLIC BLOOD PRESSURE: 66 MMHG | OXYGEN SATURATION: 99 % | HEIGHT: 63 IN | WEIGHT: 182 LBS | HEART RATE: 85 BPM | BODY MASS INDEX: 32.25 KG/M2 | SYSTOLIC BLOOD PRESSURE: 110 MMHG

## 2025-03-17 DIAGNOSIS — Z36.0 ENCOUNTER FOR ANTENATAL SCREENING FOR CHROMOSOMAL ANOMALIES: ICD-10-CM

## 2025-03-17 DIAGNOSIS — O35.2XX0 HEREDITARY FAMILIAL DISEASE AFFECTING MANAGEMENT OF MOTHER AND POSSIBLY AFFECTING FETUS, ANTEPARTUM, SINGLE OR UNSPECIFIED FETUS: ICD-10-CM

## 2025-03-17 DIAGNOSIS — Z3A.13 13 WEEKS GESTATION OF PREGNANCY: ICD-10-CM

## 2025-03-17 DIAGNOSIS — Z32.01 ENCOUNTER FOR PREGNANCY TEST, RESULT POSITIVE: ICD-10-CM

## 2025-03-17 DIAGNOSIS — O99.211 OBESITY AFFECTING PREGNANCY IN FIRST TRIMESTER, UNSPECIFIED OBESITY TYPE: ICD-10-CM

## 2025-03-17 DIAGNOSIS — Z36.82 ENCOUNTER FOR (NT) NUCHAL TRANSLUCENCY SCAN: Primary | ICD-10-CM

## 2025-03-17 DIAGNOSIS — O99.841 PREGNANCY AFFECTED BY PREVIOUS BARIATRIC SURGERY, CURRENTLY IN FIRST TRIMESTER: ICD-10-CM

## 2025-03-17 PROCEDURE — 36415 COLL VENOUS BLD VENIPUNCTURE: CPT | Performed by: OBSTETRICS & GYNECOLOGY

## 2025-03-17 PROCEDURE — 76813 OB US NUCHAL MEAS 1 GEST: CPT | Performed by: OBSTETRICS & GYNECOLOGY

## 2025-03-17 PROCEDURE — 76801 OB US < 14 WKS SINGLE FETUS: CPT | Performed by: OBSTETRICS & GYNECOLOGY

## 2025-03-17 PROCEDURE — 99203 OFFICE O/P NEW LOW 30 MIN: CPT | Performed by: OBSTETRICS & GYNECOLOGY

## 2025-03-17 NOTE — PROGRESS NOTES
Please refer to the [facility] ultrasound report in Ob Procedures for additional information regarding today's visit   PAST SURGICAL HISTORY:  No significant past surgical history

## 2025-03-17 NOTE — PROGRESS NOTES
Patient chose to have [facility] [id number] Non-Invasive Prenatal Screen [id number] PLUS w/ SCA, WITH fetal sex.  Patient choose to be billed through insurance.     Patient given brochure and is aware [facility] will contact patient's insurance and coordinate coverage.  Provided [facility] contact information. General inquiries [phone], Cost estimates [phone] and [facility] Billing [phone]. Website [email or web address].     Blood collection tubes labeled with patient identifiers (name, medical record number, and date of birth).     Filled out [facility] order form. Patient chose to have blood drawn in our office at time of visit. NIPS was drawn from right arm with a butterfly needle by Jimena. .      If patient chose to have blood work drawn at a [facility] lab we requested patient notify MFM (via phone call or [employer] message) when blood collected so office can follow up on results.       Maternal Fetal Medicine will have results in approximately 5-7 business days and will call patient or notify via [employer].  Patient aware viewing lab result online will reveal fetal sex if ordered.    Patient verbalized understanding of all instructions and no questions at this time.

## 2025-03-17 NOTE — LETTER
March 17, 2025     Rachell Harvey MD  [address] [facility] [place] [address]    Patient: Sobeida Little   YOB: 1995   Date of Visit: 3/17/2025       Dear Dr. Harvey:    Thank you for referring Sobeida Little to me for evaluation. Below are my notes for this consultation.    If you have questions, please do not hesitate to call me. I look forward to following your patient along with you.         Sincerely,        Scotty Mayers MD        CC: No Recipients    Scotty Mayers MD  3/17/2025 11:23 AM  Sign when Signing Visit  Please refer to the [facility] ultrasound report in Ob Procedures for additional information regarding today's visit    Jimena Andres  3/17/2025  9:51 AM  Sign when Signing Visit  Patient chose to have [facility] [id number] Non-Invasive Prenatal Screen [id number] PLUS w/ SCA, WITH fetal sex.  Patient choose to be billed through insurance.     Patient given brochure and is aware [facility] will contact patient's insurance and coordinate coverage.  Provided [facility] contact information. General inquiries [phone], Cost estimates [phone] and [facility] Billing [phone]. Website [email or web address].     Blood collection tubes labeled with patient identifiers (name, medical record number, and date of birth).     Filled out [facility] order form. Patient chose to have blood drawn in our office at time of visit. NIPS was drawn from right arm with a butterfly needle by Jimena. .      If patient chose to have blood work drawn at a [facility] lab we requested patient notify MFM (via phone call or [employer] message) when blood collected so office can follow up on results.       Maternal Fetal Medicine will have results in approximately 5-7 business days and will call patient or notify via [employer].  Patient aware viewing lab result online will reveal fetal sex if ordered.    Patient verbalized understanding of all instructions and no questions at this time.

## 2025-03-18 ENCOUNTER — APPOINTMENT (OUTPATIENT)
Dept: LAB | Facility: CLINIC | Age: 30
End: 2025-03-18
Payer: COMMERCIAL

## 2025-03-18 ENCOUNTER — RESULTS FOLLOW-UP (OUTPATIENT)
Dept: OBGYN CLINIC | Facility: CLINIC | Age: 30
End: 2025-03-18

## 2025-03-18 DIAGNOSIS — Z34.01 ENCOUNTER FOR SUPERVISION OF NORMAL FIRST PREGNANCY IN FIRST TRIMESTER: ICD-10-CM

## 2025-03-18 DIAGNOSIS — Z83.2 FAMILY HISTORY OF BLEEDING DISORDER IN MOTHER: ICD-10-CM

## 2025-03-18 DIAGNOSIS — Z36.89 ENCOUNTER FOR OTHER SPECIFIED ANTENATAL SCREENING: ICD-10-CM

## 2025-03-18 DIAGNOSIS — Z31.430 ENCOUNTER OF FEMALE FOR TESTING FOR GENETIC DISEASE CARRIER STATUS FOR PROCREATIVE MANAGEMENT: ICD-10-CM

## 2025-03-18 DIAGNOSIS — O99.841 BARIATRIC SURGERY STATUS COMPLICATING PREGNANCY, FIRST TRIMESTER: ICD-10-CM

## 2025-03-18 LAB
25(OH)D3 SERPL-MCNC: 32.5 NG/ML (ref 30–100)
ABO GROUP BLD: NORMAL
ALBUMIN SERPL BCG-MCNC: 3.6 G/DL (ref 3.5–5)
ALP SERPL-CCNC: 61 U/L (ref 34–104)
ALT SERPL W P-5'-P-CCNC: 11 U/L (ref 7–52)
ANION GAP SERPL CALCULATED.3IONS-SCNC: 7 MMOL/L (ref 4–13)
AST SERPL W P-5'-P-CCNC: 12 U/L (ref 13–39)
BASOPHILS # BLD AUTO: 0.03 THOUSANDS/ÂΜL (ref 0–0.1)
BASOPHILS NFR BLD AUTO: 0 % (ref 0–1)
BILIRUB SERPL-MCNC: 0.22 MG/DL (ref 0.2–1)
BILIRUB UR QL STRIP: NEGATIVE
BLD GP AB SCN SERPL QL: NEGATIVE
BUN SERPL-MCNC: 11 MG/DL (ref 5–25)
CALCIUM SERPL-MCNC: 8.4 MG/DL (ref 8.4–10.2)
CHLORIDE SERPL-SCNC: 105 MMOL/L (ref 96–108)
CLARITY UR: CLEAR
CO2 SERPL-SCNC: 23 MMOL/L (ref 21–32)
COLOR UR: ABNORMAL
CREAT SERPL-MCNC: 0.62 MG/DL (ref 0.6–1.3)
EOSINOPHIL # BLD AUTO: 0.29 THOUSAND/ÂΜL (ref 0–0.61)
EOSINOPHIL NFR BLD AUTO: 4 % (ref 0–6)
ERYTHROCYTE [DISTWIDTH] IN BLOOD BY AUTOMATED COUNT: 12.2 % (ref 11.6–15.1)
EST. AVERAGE GLUCOSE BLD GHB EST-MCNC: 94 MG/DL
GFR SERPL CREATININE-BSD FRML MDRD: 120 ML/MIN/1.73SQ M
GLUCOSE 1H P 50 G GLC PO SERPL-MCNC: 128 MG/DL (ref 70–134)
GLUCOSE SERPL-MCNC: 125 MG/DL (ref 65–140)
GLUCOSE UR STRIP-MCNC: NEGATIVE MG/DL
HBA1C MFR BLD: 4.9 %
HCT VFR BLD AUTO: 36.2 % (ref 34.8–46.1)
HGB BLD-MCNC: 12.2 G/DL (ref 11.5–15.4)
HGB UR QL STRIP.AUTO: NEGATIVE
IMM GRANULOCYTES # BLD AUTO: 0.03 THOUSAND/UL (ref 0–0.2)
IMM GRANULOCYTES NFR BLD AUTO: 0 % (ref 0–2)
KETONES UR STRIP-MCNC: NEGATIVE MG/DL
LEUKOCYTE ESTERASE UR QL STRIP: NEGATIVE
LYMPHOCYTES # BLD AUTO: 1.28 THOUSANDS/ÂΜL (ref 0.6–4.47)
LYMPHOCYTES NFR BLD AUTO: 18 % (ref 14–44)
MCH RBC QN AUTO: 30.6 PG (ref 26.8–34.3)
MCHC RBC AUTO-ENTMCNC: 33.7 G/DL (ref 31.4–37.4)
MCV RBC AUTO: 91 FL (ref 82–98)
MONOCYTES # BLD AUTO: 0.4 THOUSAND/ÂΜL (ref 0.17–1.22)
MONOCYTES NFR BLD AUTO: 6 % (ref 4–12)
NEUTROPHILS # BLD AUTO: 5.1 THOUSANDS/ÂΜL (ref 1.85–7.62)
NEUTS SEG NFR BLD AUTO: 72 % (ref 43–75)
NITRITE UR QL STRIP: NEGATIVE
NRBC BLD AUTO-RTO: 0 /100 WBCS
PH UR STRIP.AUTO: 7 [PH]
PLATELET # BLD AUTO: 225 THOUSANDS/UL (ref 149–390)
PMV BLD AUTO: 9.9 FL (ref 8.9–12.7)
POTASSIUM SERPL-SCNC: 3.6 MMOL/L (ref 3.5–5.3)
PROT SERPL-MCNC: 6.2 G/DL (ref 6.4–8.4)
PROT UR STRIP-MCNC: NEGATIVE MG/DL
RBC # BLD AUTO: 3.99 MILLION/UL (ref 3.81–5.12)
RH BLD: POSITIVE
RUBV IGG SERPL IA-ACNC: 12.1 IU/ML
SODIUM SERPL-SCNC: 135 MMOL/L (ref 135–147)
SP GR UR STRIP.AUTO: 1.03 (ref 1–1.03)
SPECIMEN EXPIRATION DATE: NORMAL
TSH SERPL DL<=0.05 MIU/L-ACNC: 0.59 UIU/ML (ref 0.45–4.5)
UROBILINOGEN UR STRIP-ACNC: 2 MG/DL
VIT B12 SERPL-MCNC: 221 PG/ML (ref 180–914)
WBC # BLD AUTO: 7.13 THOUSAND/UL (ref 4.31–10.16)

## 2025-03-18 PROCEDURE — 84425 ASSAY OF VITAMIN B-1: CPT

## 2025-03-18 PROCEDURE — 85302 CLOT INHIBIT PROT C ANTIGEN: CPT

## 2025-03-18 PROCEDURE — 85613 RUSSELL VIPER VENOM DILUTED: CPT

## 2025-03-18 PROCEDURE — 86762 RUBELLA ANTIBODY: CPT

## 2025-03-18 PROCEDURE — 85025 COMPLETE CBC W/AUTO DIFF WBC: CPT

## 2025-03-18 PROCEDURE — 86850 RBC ANTIBODY SCREEN: CPT

## 2025-03-18 PROCEDURE — 86146 BETA-2 GLYCOPROTEIN ANTIBODY: CPT

## 2025-03-18 PROCEDURE — 86900 BLOOD TYPING SEROLOGIC ABO: CPT

## 2025-03-18 PROCEDURE — 81003 URINALYSIS AUTO W/O SCOPE: CPT

## 2025-03-18 PROCEDURE — 86780 TREPONEMA PALLIDUM: CPT

## 2025-03-18 PROCEDURE — 86901 BLOOD TYPING SEROLOGIC RH(D): CPT

## 2025-03-18 PROCEDURE — 83020 HEMOGLOBIN ELECTROPHORESIS: CPT

## 2025-03-18 PROCEDURE — 85732 THROMBOPLASTIN TIME PARTIAL: CPT

## 2025-03-18 PROCEDURE — 84590 ASSAY OF VITAMIN A: CPT

## 2025-03-18 PROCEDURE — 84630 ASSAY OF ZINC: CPT

## 2025-03-18 PROCEDURE — 85301 ANTITHROMBIN III ANTIGEN: CPT

## 2025-03-18 PROCEDURE — 36415 COLL VENOUS BLD VENIPUNCTURE: CPT

## 2025-03-18 PROCEDURE — 87086 URINE CULTURE/COLONY COUNT: CPT

## 2025-03-18 PROCEDURE — 84443 ASSAY THYROID STIM HORMONE: CPT

## 2025-03-18 PROCEDURE — 85306 CLOT INHIBIT PROT S FREE: CPT

## 2025-03-18 PROCEDURE — 87077 CULTURE AEROBIC IDENTIFY: CPT

## 2025-03-18 PROCEDURE — 80053 COMPREHEN METABOLIC PANEL: CPT

## 2025-03-18 PROCEDURE — 82607 VITAMIN B-12: CPT

## 2025-03-18 PROCEDURE — 86147 CARDIOLIPIN ANTIBODY EA IG: CPT

## 2025-03-18 PROCEDURE — 82950 GLUCOSE TEST: CPT

## 2025-03-18 PROCEDURE — 86803 HEPATITIS C AB TEST: CPT

## 2025-03-18 PROCEDURE — 82306 VITAMIN D 25 HYDROXY: CPT

## 2025-03-18 PROCEDURE — 83036 HEMOGLOBIN GLYCOSYLATED A1C: CPT

## 2025-03-18 PROCEDURE — 85670 THROMBIN TIME PLASMA: CPT

## 2025-03-18 PROCEDURE — 85705 THROMBOPLASTIN INHIBITION: CPT

## 2025-03-18 PROCEDURE — 87340 HEPATITIS B SURFACE AG IA: CPT

## 2025-03-18 PROCEDURE — 87389 HIV-1 AG W/HIV-1&-2 AB AG IA: CPT

## 2025-03-19 LAB
APTT SCREEN TO CONFIRM RATIO: 0.99 RATIO (ref 0–1.34)
AT III AG ACT/NOR PPP IA: 75 % (ref 72–124)
CONFIRM APTT/NORMAL: 29.1 SEC (ref 0–47.6)
HBV SURFACE AG SER QL: NORMAL
HCV AB SER QL: NORMAL
HIV 1+2 AB+HIV1 P24 AG SERPL QL IA: NORMAL
LA PPP-IMP: NORMAL
SCREEN APTT: 28.1 SEC (ref 0–43.5)
SCREEN DRVVT: 33.8 SEC (ref 0–47)
THROMBIN TIME: 16.7 SEC (ref 0–23)
TREPONEMA PALLIDUM IGG+IGM AB [PRESENCE] IN SERUM OR PLASMA BY IMMUNOASSAY: NORMAL

## 2025-03-20 LAB
BACTERIA UR CULT: ABNORMAL
HGB A MFR BLD: 2.3 % (ref 1.8–3.2)
HGB A MFR BLD: 97.7 % (ref 96.4–98.8)
HGB F MFR BLD: 0 % (ref 0–2)
HGB FRACT BLD-IMP: NORMAL
HGB S MFR BLD: 0 %
PROT C AG PPP IA-ACNC: 70 % (ref 60–150)
PROT S ACT/NOR PPP: 51 % (ref 68–108)
ZINC SERPL-MCNC: 41 UG/DL (ref 44–115)

## 2025-03-21 ENCOUNTER — RESULTS FOLLOW-UP (OUTPATIENT)
Age: 30
End: 2025-03-21

## 2025-03-21 LAB
CFDNA.FET/CFDNA.TOTAL SFR FETUS: NORMAL %
CFDNA.FET/CFDNA.TOTAL SFR FETUS: NORMAL %
CITATION REF LAB TEST: NORMAL
FET 13+18+21+X+Y ANEUP PLAS.CFDNA: NEGATIVE
FET CHR 21 TS PLAS.CFDNA QL: NEGATIVE
FET CHR 21 TS PLAS.CFDNA QL: NEGATIVE
FET MS X RISK WBC.DNA+CFDNA QL: NOT DETECTED
FET SEX PLAS.CFDNA DOSAGE CFDNA: NORMAL
FET TS 13 RISK PLAS.CFDNA QL: NEGATIVE
FET X + Y ANEUP RISK PLAS.CFDNA SEQ-IMP: NOT DETECTED
GA EST FROM CONCEPTION DATE: NORMAL D
GESTATIONAL AGE > 9:: YES
LAB DIRECTOR NAME PROVIDER: NORMAL
LABORATORY COMMENT REPORT: NORMAL
LIMITATIONS OF THE TEST: NORMAL
NEGATIVE PREDICTIVE VALUE: NORMAL
PERFORMANCE CHARACTERISTICS: NORMAL
POSITIVE PREDICTIVE VALUE: NORMAL
REF LAB TEST METHOD: NORMAL
SL AMB NOTE:: NORMAL
TEST PERFORMANCE INFO SPEC: NORMAL
VIT B1 BLD-SCNC: 117.3 NMOL/L (ref 66.5–200)

## 2025-03-22 LAB — VIT A SERPL-MCNC: 34.8 UG/DL (ref 18.9–57.3)

## 2025-03-23 LAB
B2 GLYCOPROT1 IGA SERPL IA-ACNC: 1.2
B2 GLYCOPROT1 IGG SERPL IA-ACNC: <0.8
B2 GLYCOPROT1 IGM SERPL IA-ACNC: <2.4
CARDIOLIPIN IGA SER IA-ACNC: 2.3
CARDIOLIPIN IGG SER IA-ACNC: 0.7
CARDIOLIPIN IGM SER IA-ACNC: 3.8

## 2025-03-24 ENCOUNTER — HOSPITAL ENCOUNTER (OUTPATIENT)
Dept: ULTRASOUND IMAGING | Facility: CLINIC | Age: 30
Discharge: HOME | End: 2025-03-24
Payer: COMMERCIAL

## 2025-03-24 DIAGNOSIS — N63.13 LUMP IN LOWER OUTER QUADRANT OF RIGHT BREAST: ICD-10-CM

## 2025-03-24 LAB
CITATION REF LAB TEST: NORMAL
CLINICAL INFO: NORMAL
ETHNIC BACKGROUND STATED: NORMAL
GENE DIS ANL CARRIER INTERP-IMP: NORMAL
GENE MUT TESTED BLD/T: NORMAL
LAB DIRECTOR NAME PROVIDER: NORMAL
REASON FOR REFERRAL (NARRATIVE): NORMAL
RECOMMENDATION PATIENT DOC-IMP: NORMAL
REF LAB TEST METHOD: NORMAL
SERVICE CMNT-IMP: NORMAL
SMN1 GENE MUT ANL BLD/T: NORMAL
SPECIMEN SOURCE: NORMAL

## 2025-03-24 PROCEDURE — 76642 ULTRASOUND BREAST LIMITED: CPT

## 2025-04-01 ENCOUNTER — APPOINTMENT (OUTPATIENT)
Dept: LAB | Facility: CLINIC | Age: 30
End: 2025-04-01
Payer: COMMERCIAL

## 2025-04-01 DIAGNOSIS — Z36.89 ENCOUNTER FOR OTHER SPECIFIED ANTENATAL SCREENING: ICD-10-CM

## 2025-04-01 DIAGNOSIS — Z83.2 FAMILY HISTORY OF BLEEDING DISORDER IN MOTHER: ICD-10-CM

## 2025-04-01 DIAGNOSIS — Z34.01 ENCOUNTER FOR SUPERVISION OF NORMAL FIRST PREGNANCY IN FIRST TRIMESTER: ICD-10-CM

## 2025-04-01 PROCEDURE — 36415 COLL VENOUS BLD VENIPUNCTURE: CPT

## 2025-04-07 LAB
F2 GENE MUT ANL BLD/T: NORMAL
F5 GENE MUT ANL BLD/T: NORMAL
Lab: NORMAL
Lab: NORMAL

## 2025-04-08 ENCOUNTER — ROUTINE PRENATAL (OUTPATIENT)
Dept: OBGYN CLINIC | Facility: CLINIC | Age: 30
End: 2025-04-08

## 2025-04-08 VITALS — DIASTOLIC BLOOD PRESSURE: 64 MMHG | WEIGHT: 185.8 LBS | SYSTOLIC BLOOD PRESSURE: 124 MMHG | BODY MASS INDEX: 32.91 KG/M2

## 2025-04-08 DIAGNOSIS — Z34.02 PRENATAL CARE, FIRST PREGNANCY IN SECOND TRIMESTER: Primary | ICD-10-CM

## 2025-04-08 PROBLEM — Z34.92 PRENATAL CARE IN SECOND TRIMESTER: Status: ACTIVE | Noted: 2025-04-08

## 2025-04-08 PROCEDURE — PNV: Performed by: PHYSICIAN ASSISTANT

## 2025-04-08 NOTE — PROGRESS NOTES
OB/GYN  PN Visit  Sobeida Little  [id number]  2025  3:28 PM  Amirah Corrales PA-C    S: 29 y.o.  16w5d here for PN visit. Pregnancy complicated by h/o bariatric surgery. Fam h/o protein S def, pt without testing done.     OB complaints:  Denies c/o n/v/ha, no edema, no smoking, no DV.   No vb/lof  No cramping/ctxns or signs of PTL.    She reports significant fatigue, but no other questions or concerns. Patient states some of her fatigue might be from recent wedding. Patient is not yet feeling fetal movement. She plans on getting the AFP test. She is not experiencing significant nausea/vomiting.     O:    Pre-Mateo Vitals    Flowsheet Row Most Recent Value   Prenatal Assessment    Fetal Heart Rate 150   Fundal Height (cm) 16 cm   Movement Absent   Prenatal Vitals    Blood Pressure 124/64   Weight - Scale 84.3 kg (185 lb 12.8 oz)   Urine Albumin/Glucose    Dilation/Effacement/Station    Vaginal Drainage    Draining Fluid No   Edema    LLE Edema None   RLE Edema None   Facial Edema None            Gen: no acute distress, nonlabored breathing.  OB exam completed: fundal height, +FHT.  Urine: -/-    A/P:    1. Prenatal care, first pregnancy in second trimester  Overview:  Overview:     Labs UTD  Pap smear: 23 WNL neg HPV     GC/CT: 3/10/25 neg   Prenatal Panel: rubella NI  Blood Type: A+ RhoGam not indicated   GBS: plan at 36 weeks     Genetic Testing: negative NIPS, neg SMN, CF pending, AFP ordered    Vaccines:  Tdap: will review at 27 weeks    Birth Plan: TBD  Yellow packet to be given and consents signed at 30 weeks.   Feeding Plan: TBD  Breast pump: TBD  Pediatrician: TBD  Contraception: TBD  Ultrasounds: 3/17/25 normal early scan and NT  Orders:  -     Alpha fetoprotein, maternal; Future              RTC in 4 weeks    Amirah Corrales PA-C  2025  3:28 PM

## 2025-04-08 NOTE — PROGRESS NOTES
OB/GYN  PN Visit  Sobeida Little  [id number]  2025  2:31 PM  Amirah Corrales PA-C    S: 29 y.o.  16w5d here for PN visit. Pregnancy complicated by h/o bariatric surgery. Fam h/o protein S def, pt without testing done.     OB complaints:  Denies c/o n/v/ha, no edema, no smoking, no DV.   No vb/lof  No cramping/ctxns or signs of PTL.    She reports ***.    O:    Pre- Vitals    Flowsheet Row Most Recent Value   Prenatal Assessment    Prenatal Vitals    Blood Pressure 124/64   Weight - Scale 84.3 kg (185 lb 12.8 oz)   Urine Albumin/Glucose    Dilation/Effacement/Station    Vaginal Drainage    Edema             Gen: no acute distress, nonlabored breathing.  OB exam completed: fundal height, +FHT.  Urine: -/-    A/P:    1. Prenatal care, first pregnancy in second trimester  -     Alpha fetoprotein, maternal; Future      Overview:     Labs UTD  Pap smear: 23 WNL neg HPV     GC/CT: 3/10/25 neg   Prenatal Panel: rubella NI  Blood Type: A+ RhoGam not indicated   GBS: plan at 36 weeks     Genetic Testing: negative NIPS, neg SMN, CF pending, AFP ordered    Vaccines:  Tdap: will review at 27 weeks  Flu: ***    Birth Plan: TBD  Yellow packet to be given and consents signed at 30 weeks.   Feeding Plan: ***  Breast pump: ***  Pediatrician: ***  Contraception: ***  Ultrasounds: 3/17/25 normal early scan and NT            RTC in 4 weeks    Amirah Corrales PA-C  2025  2:31 PM

## 2025-04-12 LAB
CFTR FULL MUT ANL BLD/T SEQ: NORMAL
CITATION REF LAB TEST: NORMAL
CLINICAL INFO: NORMAL
ETHNIC BACKGROUND STATED: NORMAL
GENE DIS ANL CARRIER INTERP-IMP: NORMAL
INDICATION: NORMAL
LAB DIRECTOR NAME PROVIDER: NORMAL
RECOMMENDATION PATIENT DOC-IMP: NORMAL
REF LAB TEST METHOD: NORMAL
SERVICE CMNT-IMP: NORMAL
SPECIMEN SOURCE: NORMAL

## 2025-05-02 ENCOUNTER — NURSE TRIAGE (OUTPATIENT)
Dept: OTHER | Facility: OTHER | Age: 30
End: 2025-05-02

## 2025-05-02 ENCOUNTER — HOSPITAL ENCOUNTER (OUTPATIENT)
Facility: HOSPITAL | Age: 30
Discharge: HOME | End: 2025-05-02
Attending: OBSTETRICS & GYNECOLOGY | Admitting: OBSTETRICS & GYNECOLOGY
Payer: COMMERCIAL

## 2025-05-02 VITALS
RESPIRATION RATE: 16 BRPM | BODY MASS INDEX: 32.78 KG/M2 | OXYGEN SATURATION: 99 % | SYSTOLIC BLOOD PRESSURE: 105 MMHG | DIASTOLIC BLOOD PRESSURE: 63 MMHG | TEMPERATURE: 97.7 F | HEIGHT: 63 IN | WEIGHT: 185 LBS | HEART RATE: 78 BPM

## 2025-05-02 PROBLEM — Z3A.20 20 WEEKS GESTATION OF PREGNANCY: Status: ACTIVE | Noted: 2025-05-02

## 2025-05-02 PROCEDURE — 99214 OFFICE O/P EST MOD 30 MIN: CPT

## 2025-05-02 PROCEDURE — 76817 TRANSVAGINAL US OBSTETRIC: CPT | Performed by: OBSTETRICS & GYNECOLOGY

## 2025-05-02 PROCEDURE — NC001 PR NO CHARGE: Performed by: OBSTETRICS & GYNECOLOGY

## 2025-05-02 PROCEDURE — 76817 TRANSVAGINAL US OBSTETRIC: CPT

## 2025-05-02 NOTE — TELEPHONE ENCOUNTER
"Regardin weeks/ cramp abdomen & back/already subsided/ no other symptoms/concerned  ----- Message from Kim ALMAZAN sent at 2025  2:08 AM EDT -----  Patient stated, \"I  am 20 weeks and had a cramp in my abdomen & back . It already subsided/ no other symptoms. Just concerned.\"    "

## 2025-05-02 NOTE — TELEPHONE ENCOUNTER
"FOLLOW UP: No follow up needed    REASON FOR CONVERSATION: Pregnancy Problem    SYMPTOMS: Sudden onset of cramp in lower abdomen and lower back that subsided after repositioning. Denies any other symptoms.    OTHER: Home care advice provided and call back precautions reviewed. Patient verbalized understanding.    DISPOSITION: Home Care      Reason for Disposition   MILD abdominal pain    Answer Assessment - Initial Assessment Questions  1. LOCATION: \"Where does it hurt?\"         Pain has subsided. Pain was lower abdomen and lower back    3. ONSET: \"When did the pain begin?\" (Minutes, hours or days ago)         20 minutes ago    4. SUDDEN: \"Gradual or sudden onset?\"      Sudden onset- woke patient up while sleeping, patient adjusted position and pain improved    6. SEVERITY: \"How bad is the pain?\" \"What does it keep you from doing?\"  (e.g., Scale 1-10; mild, moderate, or severe)        Moderate    7. RECURRENT SYMPTOM: \"Have you ever had this type of stomach pain   before?\" If Yes, ask: \"When was the last time?\" and \"What happened that time?\"         Denies    8. CAUSE: \"What do you think is causing the stomach pain?        Unsure    9. RELIEVING/AGGRAVATING FACTORS: \"What makes it better or worse?\" (e.g., antacids, bowel movement, movement)        Patient repositioned and pain improved    10. FETAL MOVEMENT: \"Has the baby's movement decreased or changed significantly from normal?\"          Normal    11. OTHER SYMPTOMS: \"Do you have any other symptoms?\" (e.g., back pain, contractions, diarrhea, fever, headache, urination pain, vaginal bleeding, vaginal discharge, vomiting)          Denies    12. GUSTAVO: \"What date are you expecting to deliver?\"          09/18/2025    LBM was 5/1    Patient concerned    Protocols used: Pregnancy - Abdominal Pain Greater Than 20 Weeks EGA-Adult-    "

## 2025-05-03 NOTE — PROGRESS NOTES
"L&D Triage Note - OB/GYN  Sobeida Little 29 y.o. female MRN: [id number]  Unit/Bed#:  TRIAGE 2- Encounter: [id number]    ASSESSMENT  Sobeida Little is a 29 y.o.  at 20w1d presenting with cramping. Workup negative for  labor. She is safe to go with return precautions.    PLAN  #1. R/o PTL:   Cervix visually closed on speculum exam, no active bleeding or abnormal discharge  Microscopy negative  Cervical length 3.02 cm  SVE c/t/h  FHR: 150s via Doppler    Discharge instructions  Patient instructed to call if experiencing worsening contractions, vaginal bleeding, loss of fluid or decreased fetal movement.  Will follow up with OBGYN on 2025.    D/w Dr. Cunha  ______________    SUBJECTIVE    GUSTAVO: Estimated Date of Delivery: 25    HPI:  29 y.o.  20w1d presents with complaint of sharp cramping pain that woke her up from bed. The episode lasted about a minute and resolved. She has since then had intermittent cramping. Denies fevers, chills, n/v/d, constipation, urinary urgency/frequency/dysuria.     Contractions: denies  Leakage of fluid: denies  Vaginal Bleeding: denies  Fetal movement: present    Her obstetrical history is unremarkable    ROS:  Constitutional: Negative  Respiratory: Negative  Cardiovascular: Negative    Gastrointestinal: Negative    OBJECTIVE:    Vitals:  /63   Pulse 78   Temp 97.7 °F (36.5 °C) (Oral)   Resp 16   Ht 5' 3\" (1.6 m)   Wt 83.9 kg (185 lb)   LMP 2024   SpO2 99%   BMI 32.77 kg/m²   Body mass index is 32.77 kg/m².    Physical Exam  Vitals and nursing note reviewed.   Constitutional:       General: She is not in acute distress.     Appearance: She is well-developed.   HENT:      Head: Normocephalic and atraumatic.   Eyes:      Conjunctiva/sclera: Conjunctivae normal.   Cardiovascular:      Rate and Rhythm: Normal rate and regular rhythm.      Heart sounds: No murmur heard.  Pulmonary:      Effort: Pulmonary effort is normal. " No respiratory distress.      Breath sounds: Normal breath sounds.   Abdominal:      Palpations: Abdomen is soft.      Tenderness: There is no abdominal tenderness.   Musculoskeletal:         General: No swelling.      Cervical back: Neck supple.   Skin:     General: Skin is warm and dry.      Capillary Refill: Capillary refill takes less than 2 seconds.   Neurological:      Mental Status: She is alert.   Psychiatric:         Mood and Affect: Mood normal.         Speculum exam:  Normal external female genitalia  Cervix fully visualized and not visibly dilated  Physiologic discharge  No bleeding, pooling, abnormal discharge, or lesions noted    Microscopy:     Infection:   - neg clue cells    - neg hyphae   - neg trichomonads present    Membrane status   - neg ferning   - neg nitrazene   - neg pooling     SVE:   C/t/h    FHR:    155 bpm    IMAGING:       TVUS   Cervical length         - 3.25 cm         - 3.28 cm         - 3.02 cm    Labs: No results found for this or any previous visit (from the past 24 hours).        Liyah Poole MD  5/2/2025  11:00 PM

## 2025-05-06 NOTE — PROCEDURES
Sobeida PARR Little, a  at 20w5d with an GUSTAVO of 2025, by Last Menstrual Period, was seen at [facility] for the following procedure(s): $Procedure Type: US - Transvaginal]      Ultrasound Other  Cervical Length: 3.08         Ultrasound Probe Disinfection    A transvaginal ultrasound was performed.   Prior to use, disinfection was performed with High Level Disinfection Process (Trophon)  Probe serial number SLRA-2:  [id number] was used    Liyah Poole MD  25  9:35 AM

## 2025-05-06 NOTE — PROGRESS NOTES
OB/GYN  PN Visit  Sobeida Little  [id number]  2025  12:12 PM  Amirah Corrales PA-C    S: 29 y.o.  20w5d here for PN visit. Pregnancy complicated by hx bariatric surgery, fam hx of protein S deficiency (pt has not been tested).     OB complaints:  Denies c/o n/v/ha, no edema, no smoking, no DV.   No vb/lof  No cramping/ctxns or signs of PTL.    She reports that she is feeling well.    O:    Pre-Mateo Vitals    Flowsheet Row Most Recent Value   Prenatal Assessment    Prenatal Vitals    Blood Pressure 110/70   Weight - Scale 89.8 kg (198 lb)   Urine Albumin/Glucose    Dilation/Effacement/Station    Vaginal Drainage    Edema             Gen: no acute distress, nonlabored breathing.  OB exam completed: fundal height, +FHT.  Urine: -/-    A/P:    1. Prenatal care, first pregnancy in second trimester  Overview:  Overview:     Labs UTD  Pap smear: 23 WNL neg HPV     GC/CT: 3/10/25 neg   Prenatal Panel: rubella NI  Blood Type: A+ RhoGam not indicated   GBS: plan at 36 weeks     Genetic Testing: negative NIPS, neg SMN, CF pending, AFP ordered    Vaccines:  Tdap: will review at 27 weeks    Birth Plan: TBD  Yellow packet to be given and consents signed at 30 weeks.   Feeding Plan: TBD  Breast pump: TBD  Pediatrician: TBD  Contraception: TBD  Ultrasounds: 3/17/25 normal early scan and NT                RTC in *** weeks    Amirah Corrales PA-C  2025  12:12 PM

## 2025-05-07 ENCOUNTER — ROUTINE PRENATAL (OUTPATIENT)
Facility: HOSPITAL | Age: 30
End: 2025-05-07
Payer: COMMERCIAL

## 2025-05-07 ENCOUNTER — ROUTINE PRENATAL (OUTPATIENT)
Dept: OBGYN CLINIC | Facility: CLINIC | Age: 30
End: 2025-05-07

## 2025-05-07 VITALS — BODY MASS INDEX: 35.07 KG/M2 | SYSTOLIC BLOOD PRESSURE: 110 MMHG | WEIGHT: 198 LBS | DIASTOLIC BLOOD PRESSURE: 70 MMHG

## 2025-05-07 VITALS
BODY MASS INDEX: 34.91 KG/M2 | WEIGHT: 197 LBS | HEART RATE: 86 BPM | OXYGEN SATURATION: 99 % | HEIGHT: 63 IN | SYSTOLIC BLOOD PRESSURE: 120 MMHG | DIASTOLIC BLOOD PRESSURE: 76 MMHG

## 2025-05-07 DIAGNOSIS — O99.841 PREGNANCY AFFECTED BY PREVIOUS BARIATRIC SURGERY, CURRENTLY IN FIRST TRIMESTER: ICD-10-CM

## 2025-05-07 DIAGNOSIS — Z36.86 ENCOUNTER FOR ANTENATAL SCREENING FOR CERVICAL LENGTH: ICD-10-CM

## 2025-05-07 DIAGNOSIS — Z3A.20 20 WEEKS GESTATION OF PREGNANCY: Primary | ICD-10-CM

## 2025-05-07 DIAGNOSIS — Z36.3 ENCOUNTER FOR ANTENATAL SCREENING FOR MALFORMATION USING ULTRASOUND: ICD-10-CM

## 2025-05-07 DIAGNOSIS — Z34.02 PRENATAL CARE, FIRST PREGNANCY IN SECOND TRIMESTER: Primary | ICD-10-CM

## 2025-05-07 PROCEDURE — 76817 TRANSVAGINAL US OBSTETRIC: CPT | Performed by: OBSTETRICS & GYNECOLOGY

## 2025-05-07 PROCEDURE — 99213 OFFICE O/P EST LOW 20 MIN: CPT | Performed by: OBSTETRICS & GYNECOLOGY

## 2025-05-07 PROCEDURE — PNV: Performed by: PHYSICIAN ASSISTANT

## 2025-05-07 PROCEDURE — 76811 OB US DETAILED SNGL FETUS: CPT | Performed by: OBSTETRICS & GYNECOLOGY

## 2025-05-07 NOTE — PROGRESS NOTES
OB/GYN  PN Visit  Sobeida Little  [id number]  2025  12:41 PM  Amirah Corrales PA-C    S: 29 y.o.  20w5d here for PN visit. Pregnancy complicated by hx bariatric surgery, fam hx of protein S deficiency.     OB complaints:  Denies c/o n/v/ha, no edema, no smoking, no DV.   No vb/lof  No cramping/ctxns or signs of PTL.    She reports she is feeling well. She plans on getting the AFP testing done but has not completed yet -- will go today. Has her anatomy scan with MFM later today.    She did present to L&D triage last week for pain which was likely round ligament pain. She is feeling better now. She denies any vaginal symptoms, loss of fluid, VB, etc.    O:    Pre- Vitals    Flowsheet Row Most Recent Value   Prenatal Assessment    Fetal Heart Rate 145   Fundal Height (cm) 21 cm   Movement Present   Prenatal Vitals    Blood Pressure 110/70   Weight - Scale 89.8 kg (198 lb)   Urine Albumin/Glucose    Dilation/Effacement/Station    Vaginal Drainage    Draining Fluid No   Edema    LLE Edema None   RLE Edema None   Facial Edema None            Gen: no acute distress, nonlabored breathing.  OB exam completed: fundal height, +FHT.  Urine: -/-    A/P:    1. Prenatal care, first pregnancy in second trimester  Overview:  Overview:     Labs UTD  Pap smear: 23 WNL neg HPV     GC/CT: 3/10/25 neg   Prenatal Panel: rubella NI  Blood Type: A+ RhoGam not indicated   GBS: plan at 36 weeks     Genetic Testing: negative NIPS, neg SMN, CF pending, AFP ordered    Vaccines:  Tdap: will review at 27 weeks    Birth Plan: TBD  Yellow packet to be given and consents signed at 30 weeks.   Feeding Plan: TBD  Breast pump: TBD  Pediatrician: TBD  Contraception: TBD  Ultrasounds: 3/17/25 normal early scan and NT                RTC in 4 weeks    Amirah Corrales PA-C  2025  12:41 PM

## 2025-05-07 NOTE — PROGRESS NOTES
Ultrasound Probe Disinfection    A transvaginal ultrasound was performed.   Prior to use, disinfection was performed with High Level Disinfection Process ([employer]).  Probe serial number [id number] [id number] was used.    Leonora Richey  05/07/25  2:37 PM

## 2025-05-07 NOTE — PROGRESS NOTES
"[facility]: Sobeida was seen today for anatomic survey and cervical length screening ultrasound.  See ultrasound report under \"OB Procedures\" tab.       -----------------------------------------    The time spent on this established patient on the encounter date included 3 minutes previsit service time reviewing records and precharting, 6 minutes face-to-face service time counseling regarding results and coordinating care, and  3 minutes charting, totalling 12 minutes.  Please don't hesitate to contact our office with any concerns or questions.  -Chica Rust MD  "

## 2025-05-08 ENCOUNTER — TELEPHONE (OUTPATIENT)
Age: 30
End: 2025-05-08

## 2025-05-08 NOTE — TELEPHONE ENCOUNTER
2ND TRIMESTER CHECK-IN CALL     Overall how are you doing? Patient states she is doing well.    Compliant with routine OB care appointments? Yes    Have you completed your 1st trimester labs? Yes    If you had NIPS with MFM, do you have a order for MSAFP? Yes, patient has order and was reminded to have done in the appropriate time frame.   Can be completed 15w-21w6d, ideally 16w-18w    Have you seen MFM and do you have your detailed US scheduled? Yes completed 5/7/25    Pregnancy Education-have you had a chance to review the classes offered and registered? Yes, patient is interested and plans to register for classes.    Additional Notes: offers no c/o at this time

## 2025-05-12 ENCOUNTER — APPOINTMENT (OUTPATIENT)
Dept: LAB | Facility: CLINIC | Age: 30
End: 2025-05-12
Payer: COMMERCIAL

## 2025-05-12 DIAGNOSIS — Z34.02 PRENATAL CARE, FIRST PREGNANCY IN SECOND TRIMESTER: ICD-10-CM

## 2025-05-12 PROCEDURE — 36415 COLL VENOUS BLD VENIPUNCTURE: CPT

## 2025-05-12 PROCEDURE — 82105 ALPHA-FETOPROTEIN SERUM: CPT

## 2025-05-14 ENCOUNTER — RESULTS FOLLOW-UP (OUTPATIENT)
Dept: OBGYN CLINIC | Facility: CLINIC | Age: 30
End: 2025-05-14

## 2025-05-14 LAB
2ND TRIMESTER 4 SCREEN SERPL-IMP: NORMAL
AFP ADJ MOM SERPL: 1.66
AFP INTERP AMN-IMP: NORMAL
AFP INTERP SERPL-IMP: NORMAL
AFP INTERP SERPL-IMP: NORMAL
AFP SERPL-MCNC: 102.8 NG/ML
AGE AT DELIVERY: 31.8 YR
GA METHOD: NORMAL
GA: 21.6 WEEKS
IDDM PATIENT QL: NO
MULTIPLE PREGNANCY: NO
NEURAL TUBE DEFECT RISK FETUS: 1802 %

## 2025-05-15 ENCOUNTER — NURSE TRIAGE (OUTPATIENT)
Age: 30
End: 2025-05-15

## 2025-05-15 NOTE — TELEPHONE ENCOUNTER
"FOLLOW UP: n/a    REASON FOR CONVERSATION: Pregnancy Problem    SYMPTOMS: headaches    OTHER: 22w0d - Patient states she's had headache on and off since yesterday. Endorses she's been under more stress and feeling very tired. Was checking Bps and they've been acceptable in the 120s/70s-80s. Has not tried tylenol    Advised increase hydration, two extra strength tylenol, rest. Can try magnesium and riboflavin at bed time.     Call back with worsening, sBps >140  or dBPs >90, vision changes, swelling. Patient verbalized understanding.     DISPOSITION: Home Care      Reason for Disposition   Mild Headache    Answer Assessment - Initial Assessment Questions  1. LOCATION: \"Where does it hurt?\"       Around temples to the front  2. ONSET: \"When did the headache start?\" (e.g., minutes, hours or days)       This morning  3. PATTERN: \"Does the pain come and go, or has it been constant since it started?\"      Comes and goes  4. SEVERITY: \"How bad is the pain?\" and \"What does it keep you from doing?\"       4/10  5. RECURRENT SYMPTOM: \"Have you ever had headaches before?\" If Yes, ask: \"When was the last time?\" and \"What happened that time?\"       denies  6. CAUSE: \"What do you think is causing the headache?\"      Tired and more stressed  7. MIGRAINE: \"Have you been diagnosed with migraine headaches?\" If Yes, ask: \"Is this headache similar?\"       denies  8. HEAD INJURY: \"Has there been any recent injury to the head?\"       denies  9. OTHER SYMPTOMS: \"Do you have any other symptoms?\" (e.g., abdomen pain, blurred vision, fever, stiff neck; swelling of hands, face, or feet)      denies  10. PREGNANCY: \"How many weeks pregnant are you?\"          11. GUSTAVO: \"What date are you expecting to deliver?\"        25    Protocols used: Pregnancy - Headache-Adult-[place]    "

## 2025-06-02 ENCOUNTER — ROUTINE PRENATAL (OUTPATIENT)
Dept: OBGYN CLINIC | Facility: CLINIC | Age: 30
End: 2025-06-02

## 2025-06-02 VITALS — DIASTOLIC BLOOD PRESSURE: 72 MMHG | SYSTOLIC BLOOD PRESSURE: 116 MMHG | BODY MASS INDEX: 37.02 KG/M2 | WEIGHT: 209 LBS

## 2025-06-02 DIAGNOSIS — Z34.02 PRENATAL CARE, FIRST PREGNANCY IN SECOND TRIMESTER: Primary | ICD-10-CM

## 2025-06-02 PROCEDURE — PNV

## 2025-06-02 NOTE — ASSESSMENT & PLAN NOTE
- Continue PNV  - Labor precautions reviewed  - Fetal kick counts reviewed  - Labs: UTD. Third tri labs ordered  -Pap: 2023 NILM  -Rh: A+  - Genetics: NIPS low risk AFP neg  - Ultrasounds: L2 - poly-. Follow up in 4 weeks.   - Tdap: 27 weeks  - Flu Shot: Will offer in season  - RSV:  Will offer during season (9/1-1/31) @ [id number]   - Rhogam: NA  - Delivery: TBD  - Contraception: TBD  - Breastfeeding: pump ordered 6/2/25  - Pediatrician: TBD  -Delivery Consent & Packet: 30 weeks  -GBS: 36 weeks  - RTO in 4 weeks

## 2025-06-02 NOTE — PROGRESS NOTES
OB/GYN  PN Visit  Sobeida Little  [id number]  2025  2:53 PM  MARIZA Anton    S: 29 y.o.  24w4d here for PN visit.   She denies contractions. She denies leakage of fluid and vaginal bleeding.   She endorses good fetal movement.   Her pregnancy is complicated by history of bariatric surgery and protein s deficiency.       O:  Pre-Mateo Vitals      Flowsheet Row Most Recent Value   Prenatal Assessment    Fetal Heart Rate 150   Fundal Height (cm) 24 cm   Movement Present   Prenatal Vitals    Blood Pressure 116/72   Weight - Scale 94.8 kg (209 lb)   Urine Albumin/Glucose    Dilation/Effacement/Station    Vaginal Drainage    Edema           Wt=94.8 kg (209 lb); Body mass index is 37.02 kg/m².; TWG=42.2 kg (93 lb)  Physical Exam    General: Well appearing, no distress  Respiratory: Unlabored breathing  Abdomen: Soft, gravid, nontender  Fundal Height: Appropriate for gestational age.   Extremities: Warm and well perfused.  Non tender.  OB exam completed: fundal height, +FHT.      A/P:    Problem List Items Addressed This Visit       Prenatal care in second trimester - Primary    - Continue PNV  - Labor precautions reviewed  - Fetal kick counts reviewed  - Labs: UTD. Third tri labs ordered  -Pap:  NILM  -Rh: A+  - Genetics: NIPS low risk AFP neg  - Ultrasounds: L2 - poly-. Follow up in 4 weeks.   - Tdap: 27 weeks  - Flu Shot: Will offer in season  - RSV:  Will offer during season (-) @ [id number]   - Rhogam: NA  - Delivery: TBD  - Contraception: TBD  - Breastfeeding: pump ordered 25  - Pediatrician: TBD  -Delivery Consent & Packet: 30 weeks  -GBS: 36 weeks  - RTO in 4 weeks          Relevant Orders    CBC and differential    Glucose, 1H PG    RPR-Syphilis Screening (Total Syphilis IGG/IGM)       MARIZA Anton  2025  2:53 PM

## 2025-06-03 LAB
DME PARACHUTE DELIVERY DATE REQUESTED: NORMAL
DME PARACHUTE ITEM DESCRIPTION: NORMAL
DME PARACHUTE ORDER STATUS: NORMAL
DME PARACHUTE SUPPLIER NAME: NORMAL
DME PARACHUTE SUPPLIER PHONE: NORMAL

## 2025-06-04 ENCOUNTER — ULTRASOUND (OUTPATIENT)
Facility: HOSPITAL | Age: 30
End: 2025-06-04
Payer: COMMERCIAL

## 2025-06-04 ENCOUNTER — ANCILLARY PROCEDURE (OUTPATIENT)
Facility: HOSPITAL | Age: 30
End: 2025-06-04
Attending: PHYSICIAN ASSISTANT
Payer: COMMERCIAL

## 2025-06-04 VITALS
HEART RATE: 108 BPM | HEIGHT: 63 IN | SYSTOLIC BLOOD PRESSURE: 118 MMHG | DIASTOLIC BLOOD PRESSURE: 66 MMHG | BODY MASS INDEX: 37.02 KG/M2

## 2025-06-04 DIAGNOSIS — O40.2XX0 POLYHYDRAMNIOS IN SECOND TRIMESTER, SINGLE OR UNSPECIFIED FETUS: ICD-10-CM

## 2025-06-04 DIAGNOSIS — Z3A.24 24 WEEKS GESTATION OF PREGNANCY: Primary | ICD-10-CM

## 2025-06-04 DIAGNOSIS — O99.210 OBESITY IN PREGNANCY, ANTEPARTUM: ICD-10-CM

## 2025-06-04 DIAGNOSIS — Z3A.24 24 WEEKS GESTATION OF PREGNANCY: ICD-10-CM

## 2025-06-04 PROBLEM — Z86.16 HISTORY OF COVID-19: Status: RESOLVED | Noted: 2021-01-06 | Resolved: 2025-06-04

## 2025-06-04 PROBLEM — N92.0 MENORRHAGIA WITH REGULAR CYCLE: Status: RESOLVED | Noted: 2020-09-29 | Resolved: 2025-06-04

## 2025-06-04 PROBLEM — Z00.00 ANNUAL PHYSICAL EXAM: Status: RESOLVED | Noted: 2019-04-25 | Resolved: 2025-06-04

## 2025-06-04 PROCEDURE — 76816 OB US FOLLOW-UP PER FETUS: CPT | Performed by: OBSTETRICS & GYNECOLOGY

## 2025-06-04 PROCEDURE — 99213 OFFICE O/P EST LOW 20 MIN: CPT | Performed by: OBSTETRICS & GYNECOLOGY

## 2025-06-04 NOTE — LETTER
"   Date: 2025    MARIZA Anton  [address]    [address]  [facility] [id number]    Patient: Sobeida Little   YOB: 1995   Date of Visit: 2025   Gestational age 24w6d   Nature of this communication: Routine        This patient was seen recently in our  office.  Please see ultrasound report under \"Imaging\" tab.  Please don't hesitate to contact our office with any concerns or questions.      Sincerely,      Lisa Tierney MD  Attending Physician, Maternal-Fetal Medicine  [facility]    "

## 2025-06-04 NOTE — PROGRESS NOTES
"[facility]: Sobeida Little was seen today for fetal growth assessment ultrasound. See ultrasound report under \"Imaging\" tab.   HPI: No OB complaints, confirms active fetal movement.  Vitals:    25 1403   BP: 118/66   Pulse: (!) 108     Problem List Items Addressed This Visit          Obstetrics/Gynecology    24 weeks gestation of pregnancy - Primary    Estimated fetal weight and amniotic fluid are normal today. Repeat growth assessment advised around 32 weeks gestation, which was scheduled for 25.          Obesity in pregnancy, antepartum    Polyhydramnios in second trimester    Resolved today. Discussed expectation of repeat GDM screening by 28 weeks gestation. We discussed implication of prior bariatric surgery on screening, she tolerated glucola fine in first trimester, so can likely be re-screened in same manner within the next month.         Evaluation/Management: The total time dedicated to this patient encounter was 15 minutes (5 preparation, 5 face-to-face, and 5 documenting).    Please don't hesitate to contact our office with any concerns or questions.    -Lisa Tierney MD  "

## 2025-06-04 NOTE — ASSESSMENT & PLAN NOTE
Estimated fetal weight and amniotic fluid are normal today. Repeat growth assessment advised around 32 weeks gestation, which was scheduled for 8/1/25.

## 2025-06-04 NOTE — ASSESSMENT & PLAN NOTE
Resolved today. Discussed expectation of repeat GDM screening by 28 weeks gestation. We discussed implication of prior bariatric surgery on screening, she tolerated glucola fine in first trimester, so can likely be re-screened in same manner within the next month.

## 2025-06-11 ENCOUNTER — CLINICAL SUPPORT (OUTPATIENT)
Dept: POSTPARTUM | Facility: CLINIC | Age: 30
End: 2025-06-11

## 2025-06-11 DIAGNOSIS — Z32.2 ENCOUNTER FOR CHILDBIRTH INSTRUCTION: Primary | ICD-10-CM

## 2025-06-25 ENCOUNTER — NURSE TRIAGE (OUTPATIENT)
Dept: OTHER | Facility: OTHER | Age: 30
End: 2025-06-25

## 2025-06-26 ENCOUNTER — APPOINTMENT (OUTPATIENT)
Dept: LAB | Facility: CLINIC | Age: 30
End: 2025-06-26
Payer: COMMERCIAL

## 2025-06-26 DIAGNOSIS — Z34.02 PRENATAL CARE, FIRST PREGNANCY IN SECOND TRIMESTER: ICD-10-CM

## 2025-06-26 LAB
BASOPHILS # BLD AUTO: 0.04 THOUSANDS/ÂΜL (ref 0–0.1)
BASOPHILS NFR BLD AUTO: 0 % (ref 0–1)
EOSINOPHIL # BLD AUTO: 0.1 THOUSAND/ÂΜL (ref 0–0.61)
EOSINOPHIL NFR BLD AUTO: 1 % (ref 0–6)
ERYTHROCYTE [DISTWIDTH] IN BLOOD BY AUTOMATED COUNT: 12.5 % (ref 11.6–15.1)
GLUCOSE 1H P 50 G GLC PO SERPL-MCNC: 183 MG/DL (ref 70–134)
HCT VFR BLD AUTO: 31.5 % (ref 34.8–46.1)
HGB BLD-MCNC: 10.2 G/DL (ref 11.5–15.4)
IMM GRANULOCYTES # BLD AUTO: 0.1 THOUSAND/UL (ref 0–0.2)
IMM GRANULOCYTES NFR BLD AUTO: 1 % (ref 0–2)
LYMPHOCYTES # BLD AUTO: 1.47 THOUSANDS/ÂΜL (ref 0.6–4.47)
LYMPHOCYTES NFR BLD AUTO: 14 % (ref 14–44)
MCH RBC QN AUTO: 29.3 PG (ref 26.8–34.3)
MCHC RBC AUTO-ENTMCNC: 32.4 G/DL (ref 31.4–37.4)
MCV RBC AUTO: 91 FL (ref 82–98)
MONOCYTES # BLD AUTO: 0.32 THOUSAND/ÂΜL (ref 0.17–1.22)
MONOCYTES NFR BLD AUTO: 3 % (ref 4–12)
NEUTROPHILS # BLD AUTO: 8.25 THOUSANDS/ÂΜL (ref 1.85–7.62)
NEUTS SEG NFR BLD AUTO: 81 % (ref 43–75)
NRBC BLD AUTO-RTO: 0 /100 WBCS
PLATELET # BLD AUTO: 236 THOUSANDS/UL (ref 149–390)
PMV BLD AUTO: 10.3 FL (ref 8.9–12.7)
RBC # BLD AUTO: 3.48 MILLION/UL (ref 3.81–5.12)
TREPONEMA PALLIDUM IGG+IGM AB [PRESENCE] IN SERUM OR PLASMA BY IMMUNOASSAY: NORMAL
WBC # BLD AUTO: 10.28 THOUSAND/UL (ref 4.31–10.16)

## 2025-06-26 PROCEDURE — 86780 TREPONEMA PALLIDUM: CPT

## 2025-06-26 PROCEDURE — 85025 COMPLETE CBC W/AUTO DIFF WBC: CPT

## 2025-06-26 PROCEDURE — 82950 GLUCOSE TEST: CPT

## 2025-06-26 PROCEDURE — 36415 COLL VENOUS BLD VENIPUNCTURE: CPT

## 2025-06-26 NOTE — TELEPHONE ENCOUNTER
"REASON FOR CONVERSATION: Pregnancy Problem    SYMPTOMS: Patient is 27 weeks pregnant, had pain in her abdomen to the right of her belly button.     OTHER HEALTH INFORMATION: was getting ready for a yard sale today and was active. Pain was relieved with rest while laying down. No other symptoms, fetal movement is the same.     PROTOCOL DISPOSITION: Home Care    CARE ADVICE PROVIDED: RN reviewed round ligament pain. Advised to rest and hydrate, reviewed when to call back and to avoid aspirin and NSAIDs, importance of hydration.     PRACTICE FOLLOW-UP: NA          Reason for Disposition   MILD abdominal pain    Answer Assessment - Initial Assessment Questions  1. LOCATION: \"Where does it hurt?\"         To the right of her belly button     2. RADIATION: \"Does the pain shoot anywhere else?\" (e.g., chest, back, shoulder)        no    3. ONSET: \"When did the pain begin?\" (e.g., minutes, hours or days ago)         Started today around 6:30 pm    4. ONSET: \"Gradual or sudden onset?\"        Pain was all of a sudden     5. PATTERN \"Does the pain come and go, or has it been constant since it started?\"         At rest it goes, but with activity it is constant.     6. SEVERITY: \"How bad is the pain?\" \"What does it keep you from doing?\"  (e.g., Scale 1-10; mild, moderate, or severe)        2 right now  5-6 when pain happened     7. RECURRENT SYMPTOM: \"Have you ever had this type of stomach pain before?\" If Yes, ask: \"When was the last time?\" and \"What happened that time?\"        no    8. CAUSE: \"What do you think is causing the stomach pain?\"        Unknown - has been doing moving and getting ready for yard sale     9. RELIEVING/AGGRAVATING FACTORS: \"What makes it better or worse?\" (e.g., antacids, bowel movement, movement)        rest    10. OTHER SYMPTOMS: \"Do you have any other symptoms?\" (e.g., back pain, diarrhea, fever, urination pain, vaginal bleeding, vaginal discharge, vomiting)          none    11. GUSTAVO: \"What date are " "you expecting to deliver?\"          9/18    Protocols used: Pregnancy - Abdominal Pain Less Than 20 Weeks EGA-Adult-AH    "

## 2025-06-30 ENCOUNTER — TELEPHONE (OUTPATIENT)
Age: 30
End: 2025-06-30

## 2025-06-30 ENCOUNTER — ROUTINE PRENATAL (OUTPATIENT)
Dept: OBGYN CLINIC | Facility: CLINIC | Age: 30
End: 2025-06-30

## 2025-06-30 VITALS — WEIGHT: 217.4 LBS | SYSTOLIC BLOOD PRESSURE: 100 MMHG | DIASTOLIC BLOOD PRESSURE: 66 MMHG | BODY MASS INDEX: 38.51 KG/M2

## 2025-06-30 DIAGNOSIS — Z34.03 PRENATAL CARE, FIRST PREGNANCY IN THIRD TRIMESTER: ICD-10-CM

## 2025-06-30 DIAGNOSIS — O24.419 GESTATIONAL DIABETES MELLITUS (GDM) IN THIRD TRIMESTER, GESTATIONAL DIABETES METHOD OF CONTROL UNSPECIFIED: ICD-10-CM

## 2025-06-30 DIAGNOSIS — Z3A.28 28 WEEKS GESTATION OF PREGNANCY: Primary | ICD-10-CM

## 2025-06-30 PROBLEM — Z34.93 PRENATAL CARE IN THIRD TRIMESTER: Status: ACTIVE | Noted: 2025-04-08

## 2025-06-30 PROCEDURE — PNV

## 2025-06-30 NOTE — TELEPHONE ENCOUNTER
Patient called and I scheduled her gdm class virtual on 7/9 and she wants her second one for 4 pm also and I do not see any openings for that time spot for the rest of the month. Please advise . Thank you

## 2025-06-30 NOTE — PROGRESS NOTES
OB/GYN  PN Visit  Sobeida Little  [id number]  2025  10:31 AM  MARIZA Anton    S: 29 y.o.  28w4d here for PN visit.   She denies contractions. She denies leakage of fluid and vaginal bleeding.   She endorses good fetal movement. She is endorsing left sided sciatica pain.   Her pregnancy is complicated by list below.     O:  Pre-Mateo Vitals      Flowsheet Row Most Recent Value   Prenatal Assessment    Fetal Heart Rate 156   Fundal Height (cm) 31 cm   Movement Present   Prenatal Vitals    Blood Pressure 100/66   Weight - Scale 98.6 kg (217 lb 6.4 oz)   Urine Albumin/Glucose    Dilation/Effacement/Station    Vaginal Drainage    Edema           Wt=98.6 kg (217 lb 6.4 oz); Body mass index is 38.51 kg/m².; TWG=18.3 kg (40 lb 6.4 oz)  Physical Exam    General: Well appearing, no distress  Respiratory: Unlabored breathing  Abdomen: Soft, gravid, nontender  Fundal Height: Appropriate for gestational age.   Extremities: Warm and well perfused.  Non tender.  OB exam completed: fundal height, +FHT.    A/P:    Problem List Items Addressed This Visit       Prenatal care in third trimester    - Continue PNV  - Labor precautions reviewed  - Fetal kick counts reviewed  - Labs: UTD.   -Pap:  NILM  -Rh: A+  - Genetics: NIPS low risk AFP neg  - Ultrasounds: L2 - poly-. Follow up 2025  - Tdap: educated - may want at next appointment   - Flu Shot: Will offer in season  - RSV:  Will offer during season (-) @ [id number]   - Rhogam: NA  - Delivery: TBD  - Contraception: TBD  - Breastfeeding: pump ordered 25  - Pediatrician: TBD  -Delivery Consent & Packet: 30 weeks  -GBS: 36 weeks  - RTO in 2 weeks          24 weeks gestation of pregnancy - Primary    Estimated fetal weight and amniotic fluid are normal 2025. Repeat growth assessment advised around 32 weeks gestation, which was scheduled for 25.           Other Visit Diagnoses         Gestational diabetes mellitus (GDM) in third  trimester, gestational diabetes method of control unspecified        Relevant Orders    Ambulatory Referral to Maternal Fetal Medicine            MARIZA Anton  6/30/2025  10:31 AM

## 2025-06-30 NOTE — ASSESSMENT & PLAN NOTE
Estimated fetal weight and amniotic fluid are normal 6/4/2025. Repeat growth assessment advised around 32 weeks gestation, which was scheduled for 8/1/25.

## 2025-07-03 LAB
DME PARACHUTE DELIVERY DATE ACTUAL: NORMAL
DME PARACHUTE DELIVERY DATE REQUESTED: NORMAL
DME PARACHUTE ITEM DESCRIPTION: NORMAL
DME PARACHUTE ORDER STATUS: NORMAL
DME PARACHUTE SUPPLIER NAME: NORMAL
DME PARACHUTE SUPPLIER PHONE: NORMAL

## 2025-07-07 ENCOUNTER — CLINICAL SUPPORT (OUTPATIENT)
Dept: POSTPARTUM | Facility: CLINIC | Age: 30
End: 2025-07-07

## 2025-07-07 DIAGNOSIS — Z32.2 ENCOUNTER FOR CHILDBIRTH INSTRUCTION: Primary | ICD-10-CM

## 2025-07-09 ENCOUNTER — TELEMEDICINE (OUTPATIENT)
Facility: HOSPITAL | Age: 30
End: 2025-07-09
Payer: COMMERCIAL

## 2025-07-09 DIAGNOSIS — E66.812 CLASS 2 OBESITY DUE TO EXCESS CALORIES WITHOUT SERIOUS COMORBIDITY WITH BODY MASS INDEX (BMI) OF 38.0 TO 38.9 IN ADULT: ICD-10-CM

## 2025-07-09 DIAGNOSIS — E66.09 CLASS 2 OBESITY DUE TO EXCESS CALORIES WITHOUT SERIOUS COMORBIDITY WITH BODY MASS INDEX (BMI) OF 38.0 TO 38.9 IN ADULT: ICD-10-CM

## 2025-07-09 DIAGNOSIS — O24.419 GESTATIONAL DIABETES MELLITUS (GDM) IN THIRD TRIMESTER, GESTATIONAL DIABETES METHOD OF CONTROL UNSPECIFIED: Primary | ICD-10-CM

## 2025-07-09 DIAGNOSIS — Z3A.29 29 WEEKS GESTATION OF PREGNANCY: ICD-10-CM

## 2025-07-09 PROCEDURE — 99417 PROLNG OP E/M EACH 15 MIN: CPT | Performed by: NURSE PRACTITIONER

## 2025-07-09 PROCEDURE — 99215 OFFICE O/P EST HI 40 MIN: CPT | Performed by: NURSE PRACTITIONER

## 2025-07-09 RX ORDER — BLOOD SUGAR DIAGNOSTIC
STRIP MISCELLANEOUS
Qty: 100 STRIP | Refills: 2 | Status: SHIPPED | OUTPATIENT
Start: 2025-07-09 | End: 2025-07-24

## 2025-07-09 RX ORDER — LANCETS 33 GAUGE
EACH MISCELLANEOUS
Qty: 100 EACH | Refills: 2 | Status: SHIPPED | OUTPATIENT
Start: 2025-07-09 | End: 2025-07-24

## 2025-07-09 RX ORDER — BLOOD-GLUCOSE METER
EACH MISCELLANEOUS
Qty: 1 KIT | Refills: 0 | Status: SHIPPED | OUTPATIENT
Start: 2025-07-09 | End: 2025-07-24

## 2025-07-09 NOTE — ASSESSMENT & PLAN NOTE
-Given current anemia, will not order A1c. Last CMP within normal.   -Start self monitoring blood glucose (SMBG) fasting; 2 hours after start of each meal and with hypoglycemia.   -Glucose goals: fasting 60-95 mg/dL, 140 mg/dL or less 1 hour post meals, and 120 mg/dL or less 2 hours post meal.   -Report glucose readings weekly via [employer] every Wednesday.  -Start GDM meal plan with 3 meals and 3 snacks including recommended combination of carb, protein and fat per meal/snack.  -Please eat meal or snack every 2-3.5 hours while awake.  -No more than 8 to 10 hours of fasting overnight.  -Follow up with dietitian.  -Keep 3 day food log to review with dietitian.  -Refer to [employer] MyPlate online as a reference.  -2nd/3rd trimester minimum total daily carbohydrates 175 grams paired with half grams in protein.   -Stay active if no restriction from your OB, walk up to 30 minutes a day.  -Always have glucose available to treat hypoglycemia. Use 15:15 rule.   -Refer to hypoglycemia patient education sheet. SMBG when experiencing signs and symptoms of hypoglycemia and prior to driving.   -Serial fetal growth ultrasounds.  -If diabetes related medications are started, at 32 weeks gestation; NST twice a week and YOLANDA weekly.   -Continue prenatal vitamin as recommended.  -Continue iron supplement and take with Vitamin C.   -Continue follow-up with your OB and MFM as recommended.  -Stay in close contact with diabetes education team.  -Insulin requirements during pregnancy; basal/bolus concept and Metformin discussed.  -Very important to maintain tight glucose control during pregnancy to decrease risk factors including fetal macrosomia; birth injury; risk of ; polyhydramnios; pre-term labor; pre-eclampsia;  hypoglycemia; jaundice and stillbirth.   -Diabetes and pregnancy booklet; meal plan and hypoglycemia patient education.    -4 to 12 weeks postpartum, complete 2-hour glucose tolerance test for diabetes  screening.  -Postpartum, continue with a healthy lifestyle to decrease risk of developing type 2 diabetes.   Lab Results   Component Value Date    HGBA1C 4.9 03/18/2025       Orders:    Ambulatory Referral to Maternal Fetal Medicine    Mychart glucose flowsheet    Lancets (OneTouch Delica Plus [id number]) MISC; Use 4 a day. GDM.    glucose blood (OneTouch Verio) test strip; Test 4 times a day. GDM.    Blood Glucose Monitoring Suppl (OneTouch Verio Flex System) w/Device KIT; Dispense 1 kit per insurance formulary. GDM.

## 2025-07-09 NOTE — ASSESSMENT & PLAN NOTE
Orders:    Mychart glucose flowsheet    Lancets (OneTouch Delica Plus [id number]) MISC; Use 4 a day. GDM.    glucose blood (OneTouch Verio) test strip; Test 4 times a day. GDM.    Blood Glucose Monitoring Suppl (OneTouch Verio Flex System) w/Device KIT; Dispense 1 kit per insurance formulary. GDM.

## 2025-07-09 NOTE — PROGRESS NOTES
Virtual Regular Visit  Name: Sobeida Little      : 1995      MRN: [id number]  Encounter Provider: MARIZA Flores  Encounter Date: 2025   Encounter department: [facility]  :  Assessment & Plan  29 weeks gestation of pregnancy    Orders:    [employer] glucose flowsheet    Lancets (OneTouch Delica Plus [id number]) MISC; Use 4 a day. GDM.    glucose blood (OneTouch Verio) test strip; Test 4 times a day. GDM.    Blood Glucose Monitoring Suppl (OneTouch Verio Flex System) w/Device KIT; Dispense 1 kit per insurance formulary. GDM.    Gestational diabetes mellitus (GDM) in third trimester, gestational diabetes method of control unspecified  -Given current anemia, will not order A1c. Last CMP within normal.   -Start self monitoring blood glucose (SMBG) fasting; 2 hours after start of each meal and with hypoglycemia.   -Glucose goals: fasting 60-95 mg/dL, 140 mg/dL or less 1 hour post meals, and 120 mg/dL or less 2 hours post meal.   -Report glucose readings weekly via [employer] every Wednesday.  -Start GDM meal plan with 3 meals and 3 snacks including recommended combination of carb, protein and fat per meal/snack.  -Please eat meal or snack every 2-3.5 hours while awake.  -No more than 8 to 10 hours of fasting overnight.  -Follow up with dietitian.  -Keep 3 day food log to review with dietitian.  -Refer to [employer] MyPlate online as a reference.  -2nd/3rd trimester minimum total daily carbohydrates 175 grams paired with half grams in protein.   -Stay active if no restriction from your OB, walk up to 30 minutes a day.  -Always have glucose available to treat hypoglycemia. Use 15:15 rule.   -Refer to hypoglycemia patient education sheet. SMBG when experiencing signs and symptoms of hypoglycemia and prior to driving.   -Serial fetal growth ultrasounds.  -If diabetes related medications are started, at 32 weeks gestation; NST twice a week and YOLANDA weekly.   -Continue prenatal vitamin  as recommended.  -Continue iron supplement and take with Vitamin C.   -Continue follow-up with your OB and MFM as recommended.  -Stay in close contact with diabetes education team.  -Insulin requirements during pregnancy; basal/bolus concept and Metformin discussed.  -Very important to maintain tight glucose control during pregnancy to decrease risk factors including fetal macrosomia; birth injury; risk of ; polyhydramnios; pre-term labor; pre-eclampsia;  hypoglycemia; jaundice and stillbirth.   -Diabetes and pregnancy booklet; meal plan and hypoglycemia patient education.    -4 to 12 weeks postpartum, complete 2-hour glucose tolerance test for diabetes screening.  -Postpartum, continue with a healthy lifestyle to decrease risk of developing type 2 diabetes.   Lab Results   Component Value Date    HGBA1C 4.9 2025       Orders:    Ambulatory Referral to Maternal Fetal Medicine    Mychart glucose flowsheet    Lancets (OneTouch Delica Plus [id number]) MISC; Use 4 a day. GDM.    glucose blood (OneTouch Verio) test strip; Test 4 times a day. GDM.    Blood Glucose Monitoring Suppl (OneTouch Verio Flex System) w/Device KIT; Dispense 1 kit per insurance formulary. GDM.    Class 2 obesity due to excess calories without serious comorbidity with body mass index (BMI) of 38.0 to 38.9 in adult  -Pre-pregnancy BMI 31, weight 177 lbs.  -Current BMI 38, weight 217 lbs.  -TWG 40 lbs.  -Recommended weight gain 11-20 lbs.  -Start GDM meal plan and follow with dietitian.  -Walk up to 30 minutes a day.          History of Present Illness   Sobeida is a 30 yo  female, 29 6/7 weeks gestation who presents with GDM, 1 hour . History of PCOS, prediabetes and gastric sleeve in 2019 with 80 lbs weight loss. Was on a GLP-1 and last dose in 2024. Anemia-encouraged to add Vitamin C with iron supplement. BMI 38. BP within normal.   Wakes up 6:30 AM; 7:45 AM breakfast-wrap with egg whites, sausage with cheese and  water; 10:30 AM-blueberries; 11:30 AM-12 PM lunch-salad with chicken and water; 2:30-3 PM beef stick with cheese; 5:45 PM-cheeseburger with fries; 6:45 halo; 7:30-10 PM. Exercise is walking.   HPI  Review of Systems   Constitutional:  Negative for fatigue and fever.   HENT:  Negative for congestion and trouble swallowing.    Eyes:  Negative for visual disturbance.   Respiratory:  Negative for cough and shortness of breath.    Cardiovascular:  Negative for chest pain, palpitations and leg swelling.   Gastrointestinal:  Negative for constipation, nausea and vomiting.   Endocrine: Negative for polydipsia, polyphagia and polyuria.   Genitourinary:  Negative for difficulty urinating and vaginal bleeding.   Musculoskeletal:  Negative for back pain.   Skin:  Negative for rash.   Neurological:  Negative for headaches.   Psychiatric/Behavioral:  Negative for sleep disturbance.        Objective   LMP 12/12/2024   Labs reviewed.   Physical Exam  HENT:      Head: Normocephalic.      Nose: Nose normal.     Eyes:      Conjunctiva/sclera: Conjunctivae normal.     Pulmonary:      Effort: Pulmonary effort is normal.     Neurological:      Mental Status: She is alert and oriented to person, place, and time.     Psychiatric:         Mood and Affect: Mood normal.         Behavior: Behavior normal.         Thought Content: Thought content normal.         Judgment: Judgment normal.         Administrative Statements   Encounter provider MARIZA Flores    The Patient is located at Home and in the following state in which I hold an active license [place].    The patient was identified by name and date of birth. Sobeida KAROLYN Little was informed that this is a telemedicine visit and that the visit is being conducted through the Epic Embedded platform. She agrees to proceed..  My office door was closed. No one else was in the room.  She acknowledged consent and understanding of privacy and security of the video platform. The patient has agreed to  participate and understands they can discontinue the visit at any time.    I have spent a total time of 60 minutes in caring for this patient on the day of the visit/encounter including Diagnostic results, Instructions for management, Patient and family education, Importance of tx compliance, Risk factor reductions, Impressions, Counseling / Coordination of care, Documenting in the medical record, Reviewing/placing orders in the medical record (including tests, medications, and/or procedures), and Obtaining or reviewing history  , not including the time spent for establishing the audio/video connection.

## 2025-07-09 NOTE — PATIENT INSTRUCTIONS
-Given current anemia, will not order A1c. Last CMP within normal.   -Start self monitoring blood glucose (SMBG) fasting; 2 hours after start of each meal and with hypoglycemia.   -Glucose goals: fasting 60-95 mg/dL, 140 mg/dL or less 1 hour post meals, and 120 mg/dL or less 2 hours post meal.   -Report glucose readings weekly via [employer] every Wednesday.  -Start GDM meal plan with 3 meals and 3 snacks including recommended combination of carb, protein and fat per meal/snack.  -Please eat meal or snack every 2-3.5 hours while awake.  -No more than 8 to 10 hours of fasting overnight.  -Follow up with dietitian.  -Keep 3 day food log to review with dietitian.  -Refer to [employer] MyPlate online as a reference.  -2nd/3rd trimester minimum total daily carbohydrates 175 grams paired with half grams in protein.   -Stay active if no restriction from your OB, walk up to 30 minutes a day.  -Always have glucose available to treat hypoglycemia. Use 15:15 rule.   -Refer to hypoglycemia patient education sheet. SMBG when experiencing signs and symptoms of hypoglycemia and prior to driving.   -Serial fetal growth ultrasounds.  -If diabetes related medications are started, at 32 weeks gestation; NST twice a week and YOLANDA weekly.   -Continue prenatal vitamin as recommended.  -Continue iron supplement and take with Vitamin C.   -Continue follow-up with your OB and MFM as recommended.  -Stay in close contact with diabetes education team.  -Insulin requirements during pregnancy; basal/bolus concept and Metformin discussed.  -Very important to maintain tight glucose control during pregnancy to decrease risk factors including fetal macrosomia; birth injury; risk of ; polyhydramnios; pre-term labor; pre-eclampsia;  hypoglycemia; jaundice and stillbirth.   -Diabetes and pregnancy booklet; meal plan and hypoglycemia patient education.    -4 to 12 weeks postpartum, complete 2-hour glucose tolerance test for diabetes  screening.  -Postpartum, continue with a healthy lifestyle to decrease risk of developing type 2 diabetes.

## 2025-07-09 NOTE — ASSESSMENT & PLAN NOTE
-Pre-pregnancy BMI 31, weight 177 lbs.  -Current BMI 38, weight 217 lbs.  -TWG 40 lbs.  -Recommended weight gain 11-20 lbs.  -Start GDM meal plan and follow with dietitian.  -Walk up to 30 minutes a day.

## 2025-07-11 ENCOUNTER — TELEPHONE (OUTPATIENT)
Dept: PERINATAL CARE | Facility: OTHER | Age: 30
End: 2025-07-11

## 2025-07-11 NOTE — TELEPHONE ENCOUNTER
Spoke with patient in regards to scheduling Maternal Fetal Medicine appointment(s) for growth. [facility] has 6 office locations offering multiple types of appointments for patient's access to care.     Patient was offered the following appointment dates/ time/ location:   [place] 07/15 @ 2 pm       Patient declined the appointments offered.     Telephone encounter routed to OBGYN and Maternal Fetal Medicine clinical coordinator. ----- Message from MARIZA Flores sent at 7/9/2025  4:41 PM EDT -----  Regarding: Fetal growth ultrasound  Hi All,    New GDM dx, needs fetal growth scheduled, last growth done on 6/4/2025.    Thanks,  Cristiane

## 2025-07-14 ENCOUNTER — CLINICAL SUPPORT (OUTPATIENT)
Dept: POSTPARTUM | Facility: CLINIC | Age: 30
End: 2025-07-14

## 2025-07-14 DIAGNOSIS — Z32.2 ENCOUNTER FOR CHILDBIRTH INSTRUCTION: Primary | ICD-10-CM

## 2025-07-15 NOTE — PROGRESS NOTES
OB/GYN  PN Visit  Sobeida Little  [id number]  2025  8:45 AM  Kartik Garvey PA-C    S: 29 y.o.  30w6d here for PN visit. Pregnancy complicated by A1GDM, s/p gastric bypass surgery.     OB complaints:  (+) cramping - irreg tightening, occasional sharp jabs; (+) edema - feet - on feet/heat; Denies n/v/HA/vb/LOF/DV/smoking.  A1GDM - diet managed and working with diabetes in preg program  She reports good FM.    O:    Pre- Vitals      Flowsheet Row Most Recent Value   Prenatal Assessment    Fetal Heart Rate 145   Fundal Height (cm) 30 cm   Movement Present   Prenatal Vitals    Blood Pressure 132/80   Weight - Scale 102 kg (225 lb)   Urine Albumin/Glucose    Dilation/Effacement/Station    Vaginal Drainage    Draining Fluid No   Edema    LLE Edema None   RLE Edema None              Gen: no acute distress, nonlabored breathing.  OB exam completed: fundal height, +FHT.    A/P:    Assessment & Plan  30 weeks gestation of pregnancy  - Continue PNV and iron - best absorbed if taken at least 2 hr away from a meal and with vit C  - Labor precautions reviewed; signs and symptoms of preE reviewed  - Fetal kick counts reviewed  - Labs: UTD  -Pap: 23 - WNL (-) HRHPV; 3/10/25 - C/G neg  -Rh: pos  - Genetics: NIPS low risk AFP neg   - Ultrasounds: Follow up growth 2025   - Tdap: reviewed and encouraged (educated 25) - sent to [place] office to receive considering multiple subsequent appts at [place] - also reviewed can receive at pharmacy or PCP  - Flu Shot: Will offer in season  - RSV:  Will offer during season (-) @ [id number]   - Rhogam: n/a  - Delivery:   - Contraception: possibly POP transition to nuva ring  - Breastfeeding: pump ordered 25 - received  - Pediatrician: [facility]; referral placed 25  -Delivery Consent & Packet: at 30w  -GBS: at 36 weeks  -RTO 2 weeks  Orders:    Ambulatory Referral to Pediatrics; Future    Diet controlled gestational diabetes  mellitus (GDM) in third trimester  Continue close follow with diabetes in preg program  Lab Results   Component Value Date    HGBA1C 4.9 03/18/2025                Kartik Garvey PA-C  7/16/2025  8:45 AM

## 2025-07-16 ENCOUNTER — CLINICAL SUPPORT (OUTPATIENT)
Dept: OBGYN CLINIC | Facility: CLINIC | Age: 30
End: 2025-07-16
Payer: COMMERCIAL

## 2025-07-16 ENCOUNTER — ROUTINE PRENATAL (OUTPATIENT)
Age: 30
End: 2025-07-16

## 2025-07-16 VITALS — DIASTOLIC BLOOD PRESSURE: 80 MMHG | WEIGHT: 225 LBS | BODY MASS INDEX: 39.86 KG/M2 | SYSTOLIC BLOOD PRESSURE: 132 MMHG

## 2025-07-16 VITALS — DIASTOLIC BLOOD PRESSURE: 70 MMHG | SYSTOLIC BLOOD PRESSURE: 110 MMHG

## 2025-07-16 DIAGNOSIS — Z23 ENCOUNTER FOR IMMUNIZATION: Primary | ICD-10-CM

## 2025-07-16 DIAGNOSIS — O24.410 DIET CONTROLLED GESTATIONAL DIABETES MELLITUS (GDM) IN THIRD TRIMESTER: ICD-10-CM

## 2025-07-16 DIAGNOSIS — Z3A.30 30 WEEKS GESTATION OF PREGNANCY: Primary | ICD-10-CM

## 2025-07-16 PROCEDURE — 90471 IMMUNIZATION ADMIN: CPT

## 2025-07-16 PROCEDURE — PNV: Performed by: PHYSICIAN ASSISTANT

## 2025-07-16 PROCEDURE — 90715 TDAP VACCINE 7 YRS/> IM: CPT

## 2025-07-16 NOTE — ASSESSMENT & PLAN NOTE
Continue close follow with diabetes in preg program  Lab Results   Component Value Date    HGBA1C 4.9 03/18/2025

## 2025-07-16 NOTE — ASSESSMENT & PLAN NOTE
- Continue PNV and iron - best absorbed if taken at least 2 hr away from a meal and with vit C  - Labor precautions reviewed; signs and symptoms of preE reviewed  - Fetal kick counts reviewed  - Labs: UTD  -Pap: 23 - WNL (-) HRHPV; 3/10/25 - C/G neg  -Rh: pos  - Genetics: NIPS low risk AFP neg   - Ultrasounds: Follow up growth 2025   - Tdap: reviewed and encouraged (educated 25) - sent to [place] office to receive considering multiple subsequent appts at [place] - also reviewed can receive at pharmacy or PCP  - Flu Shot: Will offer in season  - RSV:  Will offer during season (-) @ [id number]   - Rhogam: n/a  - Delivery:   - Contraception: possibly POP transition to nuva ring  - Breastfeeding: pump ordered 25 - received  - Pediatrician: [facility]; referral placed 25  -Delivery Consent & Packet: at 30w  -GBS: at 36 weeks  -RTO 2 weeks  Orders:    Ambulatory Referral to Pediatrics; Future

## 2025-07-21 ENCOUNTER — CLINICAL SUPPORT (OUTPATIENT)
Dept: POSTPARTUM | Facility: CLINIC | Age: 30
End: 2025-07-21

## 2025-07-21 DIAGNOSIS — Z32.2 ENCOUNTER FOR CHILDBIRTH INSTRUCTION: Primary | ICD-10-CM

## 2025-07-24 ENCOUNTER — TELEMEDICINE (OUTPATIENT)
Facility: HOSPITAL | Age: 30
End: 2025-07-24
Payer: COMMERCIAL

## 2025-07-24 DIAGNOSIS — Z3A.32 32 WEEKS GESTATION OF PREGNANCY: ICD-10-CM

## 2025-07-24 DIAGNOSIS — O24.410 DIET CONTROLLED GESTATIONAL DIABETES MELLITUS (GDM) IN THIRD TRIMESTER: Primary | ICD-10-CM

## 2025-07-24 PROCEDURE — G0108 DIAB MANAGE TRN  PER INDIV: HCPCS | Performed by: DIETITIAN, REGISTERED

## 2025-07-24 RX ORDER — BLOOD-GLUCOSE METER
EACH MISCELLANEOUS
Qty: 1 KIT | Refills: 0 | Status: SHIPPED | OUTPATIENT
Start: 2025-07-24

## 2025-07-24 RX ORDER — LANCETS
EACH MISCELLANEOUS
Qty: 100 EACH | Refills: 2 | Status: SHIPPED | OUTPATIENT
Start: 2025-07-24

## 2025-07-25 ENCOUNTER — TELEPHONE (OUTPATIENT)
Dept: OBGYN CLINIC | Facility: CLINIC | Age: 30
End: 2025-07-25

## 2025-07-25 NOTE — TELEPHONE ENCOUNTER
3rd trimester phone call - 32 wk 1 day - left message & reQallhart message sent to pt 7/25/2025    Overall how are you feeling?     Compliant with routine OB appointments? yes    Have you completed your 3rd trimester lab work? yes    Have you reviewed the contents of 3rd trimester folder from office?   Have you decided on a pediatrician?      Questions on paperwork to go back to office?     Questions on the baby birth certificate and photography forms?     Send link for the Hospital Readiness Video via [employer] - sent to patient 7/25/2025

## 2025-07-31 PROBLEM — J45.909 ASTHMA AFFECTING PREGNANCY IN THIRD TRIMESTER: Status: ACTIVE | Noted: 2025-07-31

## 2025-07-31 PROBLEM — Z3A.33 33 WEEKS GESTATION OF PREGNANCY: Status: ACTIVE | Noted: 2025-07-31

## 2025-07-31 PROBLEM — O99.513 ASTHMA AFFECTING PREGNANCY IN THIRD TRIMESTER: Status: ACTIVE | Noted: 2025-07-31

## 2025-07-31 PROBLEM — O99.213 MATERNAL OBESITY, ANTEPARTUM, THIRD TRIMESTER: Status: ACTIVE | Noted: 2025-07-31

## 2025-07-31 PROBLEM — O99.843 BARIATRIC SURGERY STATUS COMPLICATING PREGNANCY, THIRD TRIMESTER: Status: ACTIVE | Noted: 2025-07-31

## 2025-08-01 ENCOUNTER — ANCILLARY PROCEDURE (OUTPATIENT)
Facility: HOSPITAL | Age: 30
End: 2025-08-01
Attending: PHYSICIAN ASSISTANT
Payer: COMMERCIAL

## 2025-08-01 ENCOUNTER — ROUTINE PRENATAL (OUTPATIENT)
Age: 30
End: 2025-08-01

## 2025-08-01 ENCOUNTER — ULTRASOUND (OUTPATIENT)
Facility: HOSPITAL | Age: 30
End: 2025-08-01
Payer: COMMERCIAL

## 2025-08-01 VITALS
DIASTOLIC BLOOD PRESSURE: 68 MMHG | WEIGHT: 228.6 LBS | BODY MASS INDEX: 40.5 KG/M2 | SYSTOLIC BLOOD PRESSURE: 116 MMHG | HEIGHT: 63 IN | HEART RATE: 114 BPM

## 2025-08-01 VITALS — DIASTOLIC BLOOD PRESSURE: 72 MMHG | BODY MASS INDEX: 40.57 KG/M2 | WEIGHT: 229 LBS | SYSTOLIC BLOOD PRESSURE: 104 MMHG

## 2025-08-01 DIAGNOSIS — O99.843 BARIATRIC SURGERY STATUS COMPLICATING PREGNANCY, THIRD TRIMESTER: ICD-10-CM

## 2025-08-01 DIAGNOSIS — E66.811 CLASS 1 OBESITY: ICD-10-CM

## 2025-08-01 DIAGNOSIS — J45.909 ASTHMA AFFECTING PREGNANCY IN THIRD TRIMESTER: ICD-10-CM

## 2025-08-01 DIAGNOSIS — O99.513 ASTHMA AFFECTING PREGNANCY IN THIRD TRIMESTER: ICD-10-CM

## 2025-08-01 DIAGNOSIS — O24.410 DIET CONTROLLED GESTATIONAL DIABETES MELLITUS (GDM) IN THIRD TRIMESTER: ICD-10-CM

## 2025-08-01 DIAGNOSIS — O99.213 MATERNAL OBESITY, ANTEPARTUM, THIRD TRIMESTER: ICD-10-CM

## 2025-08-01 DIAGNOSIS — Z3A.33 33 WEEKS GESTATION OF PREGNANCY: ICD-10-CM

## 2025-08-01 DIAGNOSIS — O99.013 ANEMIA DURING PREGNANCY IN THIRD TRIMESTER: Primary | ICD-10-CM

## 2025-08-01 DIAGNOSIS — O24.410 DIET CONTROLLED GESTATIONAL DIABETES MELLITUS (GDM) IN THIRD TRIMESTER: Primary | ICD-10-CM

## 2025-08-01 PROCEDURE — 76816 OB US FOLLOW-UP PER FETUS: CPT | Performed by: OBSTETRICS & GYNECOLOGY

## 2025-08-01 PROCEDURE — 99213 OFFICE O/P EST LOW 20 MIN: CPT | Performed by: OBSTETRICS & GYNECOLOGY

## 2025-08-05 ENCOUNTER — TELEPHONE (OUTPATIENT)
Dept: PERINATAL CARE | Facility: CLINIC | Age: 30
End: 2025-08-05

## 2025-08-05 ENCOUNTER — APPOINTMENT (OUTPATIENT)
Dept: LAB | Facility: CLINIC | Age: 30
End: 2025-08-05
Payer: COMMERCIAL

## 2025-08-05 DIAGNOSIS — O99.013 ANEMIA DURING PREGNANCY IN THIRD TRIMESTER: ICD-10-CM

## 2025-08-05 LAB
BASOPHILS # BLD AUTO: 0.02 THOUSANDS/ÂΜL (ref 0–0.1)
BASOPHILS NFR BLD AUTO: 0 % (ref 0–1)
EOSINOPHIL # BLD AUTO: 0.14 THOUSAND/ÂΜL (ref 0–0.61)
EOSINOPHIL NFR BLD AUTO: 1 % (ref 0–6)
ERYTHROCYTE [DISTWIDTH] IN BLOOD BY AUTOMATED COUNT: 13.5 % (ref 11.6–15.1)
HCT VFR BLD AUTO: 33.2 % (ref 34.8–46.1)
HGB BLD-MCNC: 10.6 G/DL (ref 11.5–15.4)
IMM GRANULOCYTES # BLD AUTO: 0.08 THOUSAND/UL (ref 0–0.2)
IMM GRANULOCYTES NFR BLD AUTO: 1 % (ref 0–2)
LYMPHOCYTES # BLD AUTO: 1.66 THOUSANDS/ÂΜL (ref 0.6–4.47)
LYMPHOCYTES NFR BLD AUTO: 16 % (ref 14–44)
MCH RBC QN AUTO: 27.8 PG (ref 26.8–34.3)
MCHC RBC AUTO-ENTMCNC: 31.9 G/DL (ref 31.4–37.4)
MCV RBC AUTO: 87 FL (ref 82–98)
MONOCYTES # BLD AUTO: 0.46 THOUSAND/ÂΜL (ref 0.17–1.22)
MONOCYTES NFR BLD AUTO: 4 % (ref 4–12)
NEUTROPHILS # BLD AUTO: 8.07 THOUSANDS/ÂΜL (ref 1.85–7.62)
NEUTS SEG NFR BLD AUTO: 78 % (ref 43–75)
NRBC BLD AUTO-RTO: 0 /100 WBCS
PLATELET # BLD AUTO: 255 THOUSANDS/UL (ref 149–390)
PMV BLD AUTO: 10.6 FL (ref 8.9–12.7)
RBC # BLD AUTO: 3.81 MILLION/UL (ref 3.81–5.12)
WBC # BLD AUTO: 10.43 THOUSAND/UL (ref 4.31–10.16)

## 2025-08-05 PROCEDURE — 36415 COLL VENOUS BLD VENIPUNCTURE: CPT

## 2025-08-05 PROCEDURE — 85025 COMPLETE CBC W/AUTO DIFF WBC: CPT

## 2025-08-07 DIAGNOSIS — O24.410 DIET CONTROLLED GESTATIONAL DIABETES MELLITUS (GDM) IN THIRD TRIMESTER: Primary | ICD-10-CM

## 2025-08-07 RX ORDER — BLOOD-GLUCOSE METER
EACH MISCELLANEOUS
Qty: 1 KIT | Refills: 0 | Status: SHIPPED | OUTPATIENT
Start: 2025-08-07

## 2025-08-07 RX ORDER — LANCETS
EACH MISCELLANEOUS
Qty: 100 EACH | Refills: 2 | Status: SHIPPED | OUTPATIENT
Start: 2025-08-07

## 2025-08-09 ENCOUNTER — CLINICAL SUPPORT (OUTPATIENT)
Age: 30
End: 2025-08-09

## 2025-08-09 DIAGNOSIS — Z32.2 ENCOUNTER FOR CHILDBIRTH INSTRUCTION: Primary | ICD-10-CM

## 2025-08-15 ENCOUNTER — ROUTINE PRENATAL (OUTPATIENT)
Age: 30
End: 2025-08-15

## (undated) DEVICE — ARM DRAPE

## (undated) DEVICE — STAPLER CANNULA SEAL: Brand: ENDOWRIST

## (undated) DEVICE — TIP-UP FENESTRATED GRASPER: Brand: ENDOWRIST

## (undated) DEVICE — BLADELESS OBTURATOR: Brand: WECK VISTA

## (undated) DEVICE — UNDYED MONOFILAMENT (POLYDIOXANONE), ABSORBABLE SURGICAL SUTURE: Brand: PDS

## (undated) DEVICE — ASTOUND STANDARD SURGICAL GOWN, XL: Brand: CONVERTORS

## (undated) DEVICE — STAPLER 60 RELOAD BLUE: Brand: SUREFORM

## (undated) DEVICE — PROXIMATE SKIN STAPLERS (35 WIDE) CONTAINS 35 STAINLESS STEEL STAPLES (FIXED HEAD): Brand: PROXIMATE

## (undated) DEVICE — STRL COTTON TIP APPLCTR 6IN PK: Brand: CARDINAL HEALTH

## (undated) DEVICE — ANTI-FOG SOLUTION WITH FOAM PAD: Brand: DEVON

## (undated) DEVICE — MEDI-VAC YANK SUCT HNDL W/TPRD BULBOUS TIP: Brand: CARDINAL HEALTH

## (undated) DEVICE — GLOVE INDICATOR PI UNDERGLOVE SZ 7.5 BLUE

## (undated) DEVICE — VESSEL SEALER EXTEND: Brand: ENDOWRIST

## (undated) DEVICE — INTENDED FOR TISSUE SEPARATION, AND OTHER PROCEDURES THAT REQUIRE A SHARP SURGICAL BLADE TO PUNCTURE OR CUT.: Brand: BARD-PARKER ® CARBON RIB-BACK BLADES

## (undated) DEVICE — SUT VICRYL 2-0 SH 27 IN UNDYED J417H

## (undated) DEVICE — STAPLER 60 RELOAD BLACK: Brand: SUREFORM

## (undated) DEVICE — [HIGH FLOW INSUFFLATOR,  DO NOT USE IF PACKAGE IS DAMAGED,  KEEP DRY,  KEEP AWAY FROM SUNLIGHT,  PROTECT FROM HEAT AND RADIOACTIVE SOURCES.]: Brand: PNEUMOSURE

## (undated) DEVICE — TUBING SMOKE EVAC W/FILTRATION DEVICE PLUMEPORT ACTIV

## (undated) DEVICE — REM POLYHESIVE ADULT PATIENT RETURN ELECTRODE: Brand: VALLEYLAB

## (undated) DEVICE — STAPLER 60 RELOAD GREEN: Brand: SUREFORM

## (undated) DEVICE — VIAL DECANTER

## (undated) DEVICE — INTENDED FOR TISSUE SEPARATION, AND OTHER PROCEDURES THAT REQUIRE A SHARP SURGICAL BLADE TO PUNCTURE OR CUT.: Brand: BARD-PARKER SAFETY BLADES SIZE 15, STERILE

## (undated) DEVICE — NEEDLE SPINAL18G X 3.5 IN QUINCKE

## (undated) DEVICE — TIBURON LAPAROSCOPIC ABDOMINAL DRAPE: Brand: CONVERTORS

## (undated) DEVICE — VIOLET BRAIDED (POLYGLACTIN 910), SYNTHETIC ABSORBABLE SUTURE: Brand: COATED VICRYL

## (undated) DEVICE — UTILITY MARKER,BLACK WITH LABELS: Brand: DEVON

## (undated) DEVICE — SYRINGE 20ML LL

## (undated) DEVICE — GLOVE SRG BIOGEL 7.5

## (undated) DEVICE — WEBRIL 6 IN UNSTERILE

## (undated) DEVICE — SUT MONOCRYL 3-0 PS-2 18 IN Y497G

## (undated) DEVICE — LIGHT HANDLE COVER SLEEVE DISP BLUE STELLAR

## (undated) DEVICE — KERLIX BANDAGE ROLL: Brand: KERLIX

## (undated) DEVICE — SUT MONOCRYL 4-0 PS-2 27 IN Y426H

## (undated) DEVICE — 3M™ STERI-STRIP™ REINFORCED ADHESIVE SKIN CLOSURES, R1547, 1/2 IN X 4 IN (12 MM X 100 MM), 6 STRIPS/ENVELOPE: Brand: 3M™ STERI-STRIP™

## (undated) DEVICE — CANNULA SEAL

## (undated) DEVICE — TROCAR: Brand: KII FIOS FIRST ENTRY

## (undated) DEVICE — PENCIL ELECTROSURG E-Z CLEAN -0035H

## (undated) DEVICE — NEEDLE 25G X 1 1/2

## (undated) DEVICE — STAPLER 60: Brand: SUREFORM

## (undated) DEVICE — ABSORBABLE WOUND CLOSURE DEVICE: Brand: V-LOC 90

## (undated) DEVICE — TUBING SUCTION 5MM X 12 FT

## (undated) DEVICE — DRAPE TOWEL: Brand: CONVERTORS

## (undated) DEVICE — INSULATED BLADE ELECTRODE: Brand: EDGE

## (undated) DEVICE — Device

## (undated) DEVICE — PLUMEPEN PRO 10FT

## (undated) DEVICE — SCD SEQUENTIAL COMPRESSION COMFORT SLEEVE MEDIUM KNEE LENGTH: Brand: KENDALL SCD

## (undated) DEVICE — TUBING LIPOSUCTION ASPIRATION 12FT STERILE

## (undated) DEVICE — IRRIG ENDO FLO TUBING

## (undated) DEVICE — REDUCER: Brand: ENDOWRIST

## (undated) DEVICE — SPECIMEN CONTAINER STERILE PEEL PACK

## (undated) DEVICE — SPONGE STICK WITH PVP-I: Brand: KENDALL

## (undated) DEVICE — VISIGI 3D®  CALIBRATION SYSTEM  SIZE 36FR SLEEVE/STD: Brand: BOEHRINGER® VISIGI 3D™ SLEEVE GASTRECTOMY CALIBRATION SYSTEM, SIZE 36FR

## (undated) DEVICE — CADIERE FORCEPS: Brand: ENDOWRIST

## (undated) DEVICE — COLUMN DRAPE

## (undated) DEVICE — CHEST/BREAST DRAPE: Brand: CONVERTORS

## (undated) DEVICE — DRAPE SHEET THREE QUARTER

## (undated) DEVICE — STRL UNIVERSAL LAPAROTOMY PACK: Brand: CARDINAL HEALTH

## (undated) DEVICE — INSUFFLATION NEEDLE TO ESTABLISH PNEUMOPERITONEUM.: Brand: INSUFFLATION NEEDLE

## (undated) DEVICE — PMI DISPOSABLE PUNCTURE CLOSURE DEVICE / SUTURE GRASPER: Brand: PMI

## (undated) DEVICE — ADHESIVE SKN CLSR HISTOACRYL FLEX 0.5ML LF

## (undated) DEVICE — ADHESIVE SKIN CLSR DERMABOND NX

## (undated) DEVICE — BETHLEHEM UNIVERSAL MINOR GEN: Brand: CARDINAL HEALTH

## (undated) DEVICE — CHLORAPREP HI-LITE 26ML ORANGE

## (undated) DEVICE — SCD SEQUENTIAL COMPRESSION COMFORT SLEEVE LARGE KNEE LENGTH: Brand: KENDALL SCD